# Patient Record
Sex: MALE | Race: WHITE | NOT HISPANIC OR LATINO | Employment: OTHER | URBAN - METROPOLITAN AREA
[De-identification: names, ages, dates, MRNs, and addresses within clinical notes are randomized per-mention and may not be internally consistent; named-entity substitution may affect disease eponyms.]

---

## 2018-01-17 NOTE — CONSULTS
History of Present Illness  Christiana Neves is a pleasant 27-year-old male who presents today for evaluation of a skin lesion of the left side of the nose  He states it has been present for about a year and has been slowly growing in size  He does admit that it bleeds on occasion particularly when he scratches it and images  It does not cause him pain nor does it ooze anything  He has no history of skin cancers and does not follow with the dermatologist       Discussion/Summary    Please see history of present illness  Shave biopsy was taken of Flo's skin lesion on the left side of the nose today  A portable cautery device was used to seal blood vessels  I educated the patient and his son on basic wound care, I've asked him to apply gentle pressure if symptoms bruising does occur  I will see them back in 2 weeks for recheck and pathology review  The patient was counseled regarding  total time of encounter was 30 minutes and 20 minutes was spent counseling        Signatures   Electronically signed by : Heber Sierra, [facility]; Mar  9 2016 10:52AM EST                       (Author)    Electronically signed by : POLA Pedraza ; Mar 16 2016  2:58PM EST

## 2018-01-23 NOTE — CONSULTS
Assessment    1  Lesion of skin of face (121 9) (L98 9)    Discussion/Summary  Discussion Summary:   Please see history of present illness  Shave biopsy was taken of Flo's skin lesion on the left side of the nose today  A portable cautery device was used to seal blood vessels  I educated the patient and his son on basic wound care, I've asked him to apply gentle pressure if symptoms bruising does occur  I will see them back in 2 weeks for recheck and pathology review  Counseling Documentation With Imm: The patient was counseled regarding  total time of encounter was 30 minutes and 20 minutes was spent counseling  History of Present Illness  HPI: Braulio Kayser is a pleasant 51-year-old male who presents today for evaluation of a skin lesion of the left side of the nose  He states it has been present for about a year and has been slowly growing in size  He does admit that it bleeds on occasion particularly when he scratches it and images  It does not cause him pain nor does it ooze anything  He has no history of skin cancers and does not follow with the dermatologist       Review of Systems  Complete-Male:   Constitutional: no fever and no chills  Eyes: no eyesight problems  ENT: no hearing loss  Cardiovascular: lower extremity edema, but no chest pain  Respiratory: Secondary to cardiac issues, but no shortness of breath  Gastrointestinal: no abdominal pain, no nausea, no constipation and no diarrhea  Musculoskeletal: no joint swelling and no joint stiffness  Integumentary: no itching and no skin wound  Psychiatric: no anxiety and no depression  Hematologic/Lymphatic: no tendency for easy bleeding and no tendency for easy bruising  ROS Reviewed:   ROS reviewed  Past Medical History  Active Problems And Past Medical History Reviewed: The active problems and past medical history were reviewed and updated today  Surgical History  Surgical History Reviewed:    The surgical history was reviewed and updated today  Family History  Family History Reviewed: The family history was reviewed and updated today  Social History  Social History Reviewed: The social history was reviewed and updated today  The social history was reviewed and is unchanged  Physical Exam  General Complete Exam (Brief) Male:   Constitutional   General appearance: No acute distress, well appearing and well nourished  Eyes   Conjunctiva and lids: No swelling, erythema, or discharge  Ears, Nose, Mouth, and Throat   External inspection of ears and nose: Normal     Skin 1 cm in diameter skin colored, raised, vasucalr looking lesion (with telangectasias)  There is some crusted blood around it  Procedure: excision of lesion  Risks and benefits were discussed with the patient  The patient was premedicated with ibuprofen  Procedure Note:   Anesthesia:  5 ml of lidocaine 1%  The lesion was located on the L nose  The patient was prepped and draped in the usual sterile fashion using Betadine  a shave biopsy of the lesion was taken  The base of the wound was cauterized with silver nitrate and cautery  Specimen: the excised lesion was place in buffered formalin and sent for pathology  Post-Procedure: Complications: there were no complications  Follow-up in the office in 2 week(s)        Future Appointments    Date/Time Provider Specialty Site   03/23/2016 02:00 PM Briseida Kapoor [facility] Plastic Surgery [facility] PLASTIC/RECONSTRUCTIVE     Signatures   Electronically signed by : Yousif Manriquez [facility]; Mar  9 2016 10:50AM EST                       (Author)    Electronically signed by : Yousif Manriquez [facility]; Mar  9 2016 10:52AM EST                       (Author)    Electronically signed by : POLA Castellanos ; Mar 16 2016  2:58PM EST

## 2018-07-01 ENCOUNTER — HOSPITAL ENCOUNTER (INPATIENT)
Facility: HOSPITAL | Age: 83
LOS: 12 days | Discharge: NURSING, REHAB OR LONG-TERM CARE | DRG: 377 | End: 2018-07-13
Attending: EMERGENCY MEDICINE | Admitting: INTERNAL MEDICINE
Payer: COMMERCIAL

## 2018-07-01 ENCOUNTER — APPOINTMENT (EMERGENCY)
Dept: CT IMAGING | Facility: HOSPITAL | Age: 83
DRG: 377 | End: 2018-07-01
Payer: COMMERCIAL

## 2018-07-01 DIAGNOSIS — N19 RENAL FAILURE: Primary | ICD-10-CM

## 2018-07-01 DIAGNOSIS — J18.9 PNEUMONIA OF BOTH LOWER LOBES DUE TO INFECTIOUS ORGANISM: ICD-10-CM

## 2018-07-01 DIAGNOSIS — N17.9 ACUTE KIDNEY INJURY (HCC): ICD-10-CM

## 2018-07-01 DIAGNOSIS — N18.30 TYPE 2 DIABETES MELLITUS WITH STAGE 3 CHRONIC KIDNEY DISEASE, WITHOUT LONG-TERM CURRENT USE OF INSULIN (HCC): ICD-10-CM

## 2018-07-01 DIAGNOSIS — R33.8 BENIGN PROSTATIC HYPERPLASIA WITH URINARY RETENTION: ICD-10-CM

## 2018-07-01 DIAGNOSIS — M79.622 LEFT UPPER ARM PAIN: ICD-10-CM

## 2018-07-01 DIAGNOSIS — E11.22 TYPE 2 DIABETES MELLITUS WITH STAGE 3 CHRONIC KIDNEY DISEASE, WITHOUT LONG-TERM CURRENT USE OF INSULIN (HCC): ICD-10-CM

## 2018-07-01 DIAGNOSIS — N18.30 CHRONIC KIDNEY DISEASE, STAGE 3 (HCC): ICD-10-CM

## 2018-07-01 DIAGNOSIS — I10 ESSENTIAL HYPERTENSION: ICD-10-CM

## 2018-07-01 DIAGNOSIS — K92.1 MELENA: ICD-10-CM

## 2018-07-01 DIAGNOSIS — N40.1 BENIGN PROSTATIC HYPERPLASIA WITH URINARY RETENTION: ICD-10-CM

## 2018-07-01 DIAGNOSIS — D64.9 ANEMIA: ICD-10-CM

## 2018-07-01 DIAGNOSIS — S30.1XXA CONTUSION OF FLANK: ICD-10-CM

## 2018-07-01 DIAGNOSIS — R91.1 PULMONARY NODULE: ICD-10-CM

## 2018-07-01 DIAGNOSIS — R19.5 HEME POSITIVE STOOL: ICD-10-CM

## 2018-07-01 PROBLEM — D62 ACUTE BLOOD LOSS ANEMIA: Status: ACTIVE | Noted: 2018-07-01

## 2018-07-01 PROBLEM — E78.5 HYPERLIPIDEMIA: Status: ACTIVE | Noted: 2018-07-01

## 2018-07-01 PROBLEM — J96.11 CHRONIC RESPIRATORY FAILURE WITH HYPOXIA (HCC): Status: ACTIVE | Noted: 2018-07-01

## 2018-07-01 PROBLEM — N17.0 ATN (ACUTE TUBULAR NECROSIS) (HCC): Status: ACTIVE | Noted: 2018-07-01

## 2018-07-01 PROBLEM — J44.9 COPD (CHRONIC OBSTRUCTIVE PULMONARY DISEASE) (HCC): Status: ACTIVE | Noted: 2018-07-01

## 2018-07-01 PROBLEM — E11.9 DIABETES MELLITUS (HCC): Status: ACTIVE | Noted: 2018-07-01

## 2018-07-01 PROBLEM — I50.9 CHF (CONGESTIVE HEART FAILURE) (HCC): Status: ACTIVE | Noted: 2018-07-01

## 2018-07-01 PROBLEM — I12.9 PARENCHYMAL RENAL HYPERTENSION: Status: ACTIVE | Noted: 2018-07-01

## 2018-07-01 LAB
ABO GROUP BLD BPU: NORMAL
ABO GROUP BLD: NORMAL
ALBUMIN SERPL BCP-MCNC: 3.3 G/DL (ref 3.5–5)
ALP SERPL-CCNC: 70 U/L (ref 46–116)
ALT SERPL W P-5'-P-CCNC: 21 U/L (ref 12–78)
ANION GAP SERPL CALCULATED.3IONS-SCNC: 12 MMOL/L (ref 4–13)
ANION GAP SERPL CALCULATED.3IONS-SCNC: 6 MMOL/L (ref 4–13)
APTT PPP: 26 SECONDS (ref 24–36)
AST SERPL W P-5'-P-CCNC: 27 U/L (ref 5–45)
ATRIAL RATE: 72 BPM
BACTERIA UR QL AUTO: ABNORMAL /HPF
BASOPHILS # BLD MANUAL: 0 THOUSAND/UL (ref 0–0.1)
BASOPHILS NFR MAR MANUAL: 0 % (ref 0–1)
BILIRUB SERPL-MCNC: 0.4 MG/DL (ref 0.2–1)
BILIRUB UR QL STRIP: NEGATIVE
BLD GP AB SCN SERPL QL: NEGATIVE
BPU ID: NORMAL
BUN SERPL-MCNC: 103 MG/DL (ref 5–25)
BUN SERPL-MCNC: 103 MG/DL (ref 5–25)
CALCIUM SERPL-MCNC: 8 MG/DL (ref 8.3–10.1)
CALCIUM SERPL-MCNC: 8.2 MG/DL (ref 8.3–10.1)
CHLORIDE SERPL-SCNC: 100 MMOL/L (ref 100–108)
CHLORIDE SERPL-SCNC: 95 MMOL/L (ref 100–108)
CK SERPL-CCNC: 56 U/L (ref 39–308)
CLARITY UR: CLEAR
CO2 SERPL-SCNC: 31 MMOL/L (ref 21–32)
CO2 SERPL-SCNC: 33 MMOL/L (ref 21–32)
COLOR UR: YELLOW
CREAT SERPL-MCNC: 2.52 MG/DL (ref 0.6–1.3)
CREAT SERPL-MCNC: 2.81 MG/DL (ref 0.6–1.3)
CROSSMATCH: NORMAL
EOSINOPHIL # BLD MANUAL: 0 THOUSAND/UL (ref 0–0.4)
EOSINOPHIL NFR BLD MANUAL: 0 % (ref 0–6)
ERYTHROCYTE [DISTWIDTH] IN BLOOD BY AUTOMATED COUNT: 15.4 % (ref 11.6–15.1)
GFR SERPL CREATININE-BSD FRML MDRD: 19 ML/MIN/1.73SQ M
GFR SERPL CREATININE-BSD FRML MDRD: 21 ML/MIN/1.73SQ M
GLUCOSE SERPL-MCNC: 194 MG/DL (ref 65–140)
GLUCOSE SERPL-MCNC: 208 MG/DL (ref 65–140)
GLUCOSE SERPL-MCNC: 320 MG/DL (ref 65–140)
GLUCOSE SERPL-MCNC: 342 MG/DL (ref 65–140)
GLUCOSE SERPL-MCNC: 374 MG/DL (ref 65–140)
GLUCOSE UR STRIP-MCNC: ABNORMAL MG/DL
HCT VFR BLD AUTO: 24.4 % (ref 36.5–49.3)
HCT VFR BLD AUTO: 24.9 % (ref 36.5–49.3)
HCT VFR BLD AUTO: 25.6 % (ref 36.5–49.3)
HGB BLD-MCNC: 7.4 G/DL (ref 12–17)
HGB BLD-MCNC: 7.5 G/DL (ref 12–17)
HGB BLD-MCNC: 7.8 G/DL (ref 12–17)
HGB UR QL STRIP.AUTO: ABNORMAL
HYALINE CASTS #/AREA URNS LPF: ABNORMAL /LPF
INR PPP: 1.17 (ref 0.86–1.17)
KETONES UR STRIP-MCNC: NEGATIVE MG/DL
LEUKOCYTE ESTERASE UR QL STRIP: NEGATIVE
LYMPHOCYTES # BLD AUTO: 0.62 THOUSAND/UL (ref 0.6–4.47)
LYMPHOCYTES # BLD AUTO: 4 % (ref 14–44)
MCH RBC QN AUTO: 26.9 PG (ref 26.8–34.3)
MCHC RBC AUTO-ENTMCNC: 29.3 G/DL (ref 31.4–37.4)
MCV RBC AUTO: 92 FL (ref 82–98)
MONOCYTES # BLD AUTO: 0.93 THOUSAND/UL (ref 0–1.22)
MONOCYTES NFR BLD: 6 % (ref 4–12)
NEUTROPHILS # BLD MANUAL: 13.91 THOUSAND/UL (ref 1.85–7.62)
NEUTS SEG NFR BLD AUTO: 90 % (ref 43–75)
NITRITE UR QL STRIP: NEGATIVE
NON-SQ EPI CELLS URNS QL MICRO: ABNORMAL /HPF
NRBC BLD AUTO-RTO: 1 /100 WBC (ref 0–2)
PH UR STRIP.AUTO: 5.5 [PH] (ref 4.5–8)
PLATELET # BLD AUTO: 286 THOUSANDS/UL (ref 149–390)
PLATELET BLD QL SMEAR: ADEQUATE
PMV BLD AUTO: 10 FL (ref 8.9–12.7)
POTASSIUM SERPL-SCNC: 4.2 MMOL/L (ref 3.5–5.3)
POTASSIUM SERPL-SCNC: 4.8 MMOL/L (ref 3.5–5.3)
PROT SERPL-MCNC: 7.4 G/DL (ref 6.4–8.2)
PROT UR STRIP-MCNC: ABNORMAL MG/DL
PROTHROMBIN TIME: 14.2 SECONDS (ref 11.8–14.2)
QRS AXIS: -87 DEGREES
QRSD INTERVAL: 168 MS
QT INTERVAL: 502 MS
QTC INTERVAL: 553 MS
RBC # BLD AUTO: 2.79 MILLION/UL (ref 3.88–5.62)
RBC #/AREA URNS AUTO: ABNORMAL /HPF
RBC MORPH BLD: NORMAL
RH BLD: POSITIVE
SODIUM 24H UR-SCNC: 24 MOL/L
SODIUM SERPL-SCNC: 138 MMOL/L (ref 136–145)
SODIUM SERPL-SCNC: 139 MMOL/L (ref 136–145)
SP GR UR STRIP.AUTO: 1.01 (ref 1–1.03)
SPECIMEN EXPIRATION DATE: NORMAL
T WAVE AXIS: 104 DEGREES
TOTAL CELLS COUNTED SPEC: 100
TROPONIN I SERPL-MCNC: 0.12 NG/ML
UNIT DISPENSE STATUS: NORMAL
UNIT PRODUCT CODE: NORMAL
UNIT RH: NORMAL
UROBILINOGEN UR QL STRIP.AUTO: 0.2 E.U./DL
VENTRICULAR RATE: 73 BPM
WBC # BLD AUTO: 15.45 THOUSAND/UL (ref 4.31–10.16)
WBC #/AREA URNS AUTO: ABNORMAL /HPF

## 2018-07-01 PROCEDURE — 82550 ASSAY OF CK (CPK): CPT | Performed by: INTERNAL MEDICINE

## 2018-07-01 PROCEDURE — 71250 CT THORAX DX C-: CPT

## 2018-07-01 PROCEDURE — 93005 ELECTROCARDIOGRAM TRACING: CPT

## 2018-07-01 PROCEDURE — 85730 THROMBOPLASTIN TIME PARTIAL: CPT | Performed by: EMERGENCY MEDICINE

## 2018-07-01 PROCEDURE — 70450 CT HEAD/BRAIN W/O DYE: CPT

## 2018-07-01 PROCEDURE — 74176 CT ABD & PELVIS W/O CONTRAST: CPT

## 2018-07-01 PROCEDURE — 86850 RBC ANTIBODY SCREEN: CPT | Performed by: EMERGENCY MEDICINE

## 2018-07-01 PROCEDURE — 93010 ELECTROCARDIOGRAM REPORT: CPT | Performed by: INTERNAL MEDICINE

## 2018-07-01 PROCEDURE — 85007 BL SMEAR W/DIFF WBC COUNT: CPT | Performed by: EMERGENCY MEDICINE

## 2018-07-01 PROCEDURE — 86901 BLOOD TYPING SEROLOGIC RH(D): CPT | Performed by: EMERGENCY MEDICINE

## 2018-07-01 PROCEDURE — 85014 HEMATOCRIT: CPT | Performed by: INTERNAL MEDICINE

## 2018-07-01 PROCEDURE — P9021 RED BLOOD CELLS UNIT: HCPCS

## 2018-07-01 PROCEDURE — 99222 1ST HOSP IP/OBS MODERATE 55: CPT | Performed by: INTERNAL MEDICINE

## 2018-07-01 PROCEDURE — 72131 CT LUMBAR SPINE W/O DYE: CPT

## 2018-07-01 PROCEDURE — 80048 BASIC METABOLIC PNL TOTAL CA: CPT | Performed by: INTERNAL MEDICINE

## 2018-07-01 PROCEDURE — 84300 ASSAY OF URINE SODIUM: CPT | Performed by: INTERNAL MEDICINE

## 2018-07-01 PROCEDURE — 94640 AIRWAY INHALATION TREATMENT: CPT

## 2018-07-01 PROCEDURE — 82948 REAGENT STRIP/BLOOD GLUCOSE: CPT

## 2018-07-01 PROCEDURE — 86920 COMPATIBILITY TEST SPIN: CPT

## 2018-07-01 PROCEDURE — 80053 COMPREHEN METABOLIC PANEL: CPT | Performed by: EMERGENCY MEDICINE

## 2018-07-01 PROCEDURE — 86900 BLOOD TYPING SEROLOGIC ABO: CPT | Performed by: EMERGENCY MEDICINE

## 2018-07-01 PROCEDURE — 85610 PROTHROMBIN TIME: CPT | Performed by: EMERGENCY MEDICINE

## 2018-07-01 PROCEDURE — 99285 EMERGENCY DEPT VISIT HI MDM: CPT

## 2018-07-01 PROCEDURE — 94664 DEMO&/EVAL PT USE INHALER: CPT

## 2018-07-01 PROCEDURE — 94760 N-INVAS EAR/PLS OXIMETRY 1: CPT

## 2018-07-01 PROCEDURE — C9113 INJ PANTOPRAZOLE SODIUM, VIA: HCPCS | Performed by: EMERGENCY MEDICINE

## 2018-07-01 PROCEDURE — 99223 1ST HOSP IP/OBS HIGH 75: CPT | Performed by: INTERNAL MEDICINE

## 2018-07-01 PROCEDURE — 85018 HEMOGLOBIN: CPT | Performed by: INTERNAL MEDICINE

## 2018-07-01 PROCEDURE — 36415 COLL VENOUS BLD VENIPUNCTURE: CPT | Performed by: EMERGENCY MEDICINE

## 2018-07-01 PROCEDURE — C9113 INJ PANTOPRAZOLE SODIUM, VIA: HCPCS | Performed by: INTERNAL MEDICINE

## 2018-07-01 PROCEDURE — 87081 CULTURE SCREEN ONLY: CPT | Performed by: EMERGENCY MEDICINE

## 2018-07-01 PROCEDURE — 81001 URINALYSIS AUTO W/SCOPE: CPT | Performed by: EMERGENCY MEDICINE

## 2018-07-01 PROCEDURE — 30233N1 TRANSFUSION OF NONAUTOLOGOUS RED BLOOD CELLS INTO PERIPHERAL VEIN, PERCUTANEOUS APPROACH: ICD-10-PCS | Performed by: INTERNAL MEDICINE

## 2018-07-01 PROCEDURE — 84484 ASSAY OF TROPONIN QUANT: CPT | Performed by: INTERNAL MEDICINE

## 2018-07-01 PROCEDURE — 85027 COMPLETE CBC AUTOMATED: CPT | Performed by: EMERGENCY MEDICINE

## 2018-07-01 PROCEDURE — 72125 CT NECK SPINE W/O DYE: CPT

## 2018-07-01 RX ORDER — METOPROLOL SUCCINATE 25 MG/1
25 TABLET, EXTENDED RELEASE ORAL DAILY
Status: DISCONTINUED | OUTPATIENT
Start: 2018-07-01 | End: 2018-07-02

## 2018-07-01 RX ORDER — ALBUTEROL SULFATE 2.5 MG/3ML
2.5 SOLUTION RESPIRATORY (INHALATION) EVERY 6 HOURS PRN
Status: DISCONTINUED | OUTPATIENT
Start: 2018-07-01 | End: 2018-07-13 | Stop reason: HOSPADM

## 2018-07-01 RX ORDER — ACETAMINOPHEN 325 MG/1
650 TABLET ORAL EVERY 6 HOURS PRN
Status: DISCONTINUED | OUTPATIENT
Start: 2018-07-01 | End: 2018-07-13 | Stop reason: HOSPADM

## 2018-07-01 RX ORDER — ACETAMINOPHEN 650 MG/1
650 SUPPOSITORY RECTAL EVERY 4 HOURS PRN
COMMUNITY

## 2018-07-01 RX ORDER — SPIRONOLACTONE 25 MG/1
25 TABLET ORAL DAILY
COMMUNITY
End: 2018-07-13 | Stop reason: HOSPADM

## 2018-07-01 RX ORDER — ALBUTEROL SULFATE 2.5 MG/3ML
2.5 SOLUTION RESPIRATORY (INHALATION) EVERY 6 HOURS PRN
COMMUNITY

## 2018-07-01 RX ORDER — POLYETHYLENE GLYCOL 3350 17 G/17G
17 POWDER, FOR SOLUTION ORAL DAILY PRN
COMMUNITY

## 2018-07-01 RX ORDER — METOLAZONE 5 MG/1
5 TABLET ORAL DAILY
COMMUNITY
End: 2018-07-13 | Stop reason: HOSPADM

## 2018-07-01 RX ORDER — BUDESONIDE 0.5 MG/2ML
0.5 INHALANT ORAL
Status: DISCONTINUED | OUTPATIENT
Start: 2018-07-01 | End: 2018-07-01

## 2018-07-01 RX ORDER — LATANOPROST 50 UG/ML
1 SOLUTION/ DROPS OPHTHALMIC
Status: DISCONTINUED | OUTPATIENT
Start: 2018-07-01 | End: 2018-07-13 | Stop reason: HOSPADM

## 2018-07-01 RX ORDER — PANTOPRAZOLE SODIUM 40 MG/1
40 INJECTION, POWDER, FOR SOLUTION INTRAVENOUS ONCE
Status: COMPLETED | OUTPATIENT
Start: 2018-07-01 | End: 2018-07-01

## 2018-07-01 RX ORDER — TRAMADOL HYDROCHLORIDE 50 MG/1
50 TABLET ORAL EVERY 6 HOURS PRN
Status: DISCONTINUED | OUTPATIENT
Start: 2018-07-01 | End: 2018-07-01

## 2018-07-01 RX ORDER — BUDESONIDE 0.5 MG/2ML
0.5 INHALANT ORAL 2 TIMES DAILY
COMMUNITY

## 2018-07-01 RX ORDER — HYDROCODONE BITARTRATE AND ACETAMINOPHEN 5; 325 MG/1; MG/1
1 TABLET ORAL EVERY 6 HOURS PRN
Status: DISCONTINUED | OUTPATIENT
Start: 2018-07-01 | End: 2018-07-13 | Stop reason: HOSPADM

## 2018-07-01 RX ORDER — BUDESONIDE 0.5 MG/2ML
0.5 INHALANT ORAL
Status: DISCONTINUED | OUTPATIENT
Start: 2018-07-01 | End: 2018-07-13 | Stop reason: HOSPADM

## 2018-07-01 RX ORDER — PANTOPRAZOLE SODIUM 40 MG/1
40 INJECTION, POWDER, FOR SOLUTION INTRAVENOUS EVERY 12 HOURS SCHEDULED
Status: DISCONTINUED | OUTPATIENT
Start: 2018-07-01 | End: 2018-07-02

## 2018-07-01 RX ORDER — SODIUM CHLORIDE 9 MG/ML
100 INJECTION, SOLUTION INTRAVENOUS CONTINUOUS
Status: CANCELLED | OUTPATIENT
Start: 2018-07-01

## 2018-07-01 RX ORDER — PRAVASTATIN SODIUM 40 MG
40 TABLET ORAL
Status: DISCONTINUED | OUTPATIENT
Start: 2018-07-01 | End: 2018-07-13 | Stop reason: HOSPADM

## 2018-07-01 RX ORDER — PREDNISONE 10 MG/1
10 TABLET ORAL DAILY
COMMUNITY
Start: 2018-07-02 | End: 2018-07-13 | Stop reason: HOSPADM

## 2018-07-01 RX ORDER — ACETAMINOPHEN AND CODEINE PHOSPHATE 300; 30 MG/1; MG/1
1 TABLET ORAL EVERY 4 HOURS PRN
Status: DISCONTINUED | OUTPATIENT
Start: 2018-07-01 | End: 2018-07-01

## 2018-07-01 RX ORDER — ECHINACEA PURPUREA EXTRACT 125 MG
1 TABLET ORAL 2 TIMES DAILY
COMMUNITY

## 2018-07-01 RX ORDER — ONDANSETRON 2 MG/ML
4 INJECTION INTRAMUSCULAR; INTRAVENOUS EVERY 6 HOURS PRN
Status: DISCONTINUED | OUTPATIENT
Start: 2018-07-01 | End: 2018-07-13 | Stop reason: HOSPADM

## 2018-07-01 RX ORDER — SODIUM CHLORIDE 9 MG/ML
60 INJECTION, SOLUTION INTRAVENOUS CONTINUOUS
Status: DISCONTINUED | OUTPATIENT
Start: 2018-07-01 | End: 2018-07-03

## 2018-07-01 RX ADMIN — BUDESONIDE 0.5 MG: 0.5 INHALANT RESPIRATORY (INHALATION) at 19:54

## 2018-07-01 RX ADMIN — PANTOPRAZOLE SODIUM 40 MG: 40 INJECTION, POWDER, FOR SOLUTION INTRAVENOUS at 10:15

## 2018-07-01 RX ADMIN — PRAVASTATIN SODIUM 40 MG: 40 TABLET ORAL at 16:48

## 2018-07-01 RX ADMIN — METOPROLOL SUCCINATE 25 MG: 25 TABLET, EXTENDED RELEASE ORAL at 12:42

## 2018-07-01 RX ADMIN — INSULIN LISPRO 6 UNITS: 100 INJECTION, SOLUTION INTRAVENOUS; SUBCUTANEOUS at 16:49

## 2018-07-01 RX ADMIN — FLUTICASONE PROPIONATE AND SALMETEROL 1 PUFF: 50; 250 POWDER RESPIRATORY (INHALATION) at 19:54

## 2018-07-01 RX ADMIN — SODIUM CHLORIDE 100 ML/HR: 0.9 INJECTION, SOLUTION INTRAVENOUS at 10:15

## 2018-07-01 RX ADMIN — ACETAMINOPHEN 650 MG: 325 TABLET, FILM COATED ORAL at 20:16

## 2018-07-01 RX ADMIN — LATANOPROST 1 DROP: 50 SOLUTION OPHTHALMIC at 21:48

## 2018-07-01 RX ADMIN — INSULIN LISPRO 5 UNITS: 100 INJECTION, SOLUTION INTRAVENOUS; SUBCUTANEOUS at 13:16

## 2018-07-01 RX ADMIN — PANTOPRAZOLE SODIUM 40 MG: 40 INJECTION, POWDER, FOR SOLUTION INTRAVENOUS at 20:13

## 2018-07-01 RX ADMIN — ALBUTEROL SULFATE 2.5 MG: 2.5 SOLUTION RESPIRATORY (INHALATION) at 19:54

## 2018-07-01 RX ADMIN — INSULIN LISPRO 1 UNITS: 100 INJECTION, SOLUTION INTRAVENOUS; SUBCUTANEOUS at 21:48

## 2018-07-01 NOTE — PROGRESS NOTES
Patient laid flat to reposition; he suddenly became flushed with distant stare, guppy breathing with o2 sat dropping to89% on 4L NC and unable to verbally respond to writer  Resolved with head elevation  Dr Terrazas [employer] notified  Same event occurred when patient arrived to unit at 1045am     Family at bedside and unaware if this has happened in past with patient

## 2018-07-01 NOTE — CASE MANAGEMENT
Thank you,  Carmen Aqq  [facility] Utilization Review Department  Phone: [phone]; Fax [phone]  ATTENTION: The Network Utilization Review Department is now centralized for our 9 Facilities  Make a note that we have a new phone and fax numbers for our Department  Please call with any questions or concerns to [phone] and carefully follow the prompts so that you are directed to the right person  All voicemails are confidential  Fax any determinations, approvals, denials, and requests for initial or continue stay review clinical to [phone]  Due to HIGH CALL volume, it would be easier if you could please send faxed requests to expedite your requests and in part, help us provide discharge notifications faster     ===============================================================    Initial Clinical Review    Admission: Date/Time/Statement: 7/1/18 @ 0953   Orders Placed This Encounter   Procedures    Inpatient Admission (expected length of stay for this patient is greater than two midnights)     Standing Status:   Standing     Number of Occurrences:   1     Order Specific Question:   Admitting Physician     Answer:   Aaron Loco [[id number]]     Order Specific Question:   Level of Care     Answer:   Level 1 Stepdown [13]     Order Specific Question:   Estimated length of stay     Answer:   More than 2 Midnights     Order Specific Question:   Certification     Answer:   I certify that inpatient services are medically necessary for this patient for a duration of greater than two midnights  See H&P and MD Progress Notes for additional information about the patient's course of treatment           ED: Date/Time/Mode of Arrival:   ED Arrival Information     Expected Arrival Acuity Means of Arrival Escorted By Service Admission Type    - 7/1/2018 07:20 - Ambulance [id number] EMS General Medicine Emergency    Arrival Complaint    fall          Chief Complaint:   Chief Complaint   Patient presents with    Fall     Patient sent in from [facility] after falling out of his wheelchair today  Staff states this is the 3rd time he has done this in 2 days  Patietn initially complaisned of back pain but none upon arrival       History of Illness:   Chastity Williamson is a 80 y o  male who presented presented to the emergency department from [address] [place] [address] with a fall  As per patient he was going to the bathroom when he lost his balance and fell  He denies any loss of consciousness or head injury  He was sent to ER with right flank contusion     ED Vital Signs:   ED Triage Vitals   Temperature Pulse Respirations Blood Pressure SpO2   07/01/18 0732 07/01/18 0732 07/01/18 0732 07/01/18 0732 07/01/18 0744   (!) 96 3 °F (35 7 °C) 72 20 126/58 95 %      Temp Source Heart Rate Source Patient Position - Orthostatic VS BP Location FiO2 (%)   07/01/18 1047 07/01/18 1522 07/01/18 1047 07/01/18 1047 --   Axillary Monitor Lying Left arm       Pain Score       07/01/18 1045       No Pain        Wt Readings from Last 1 Encounters:   07/01/18 80 6 kg (177 lb 11 1 oz)     Abnormal Labs/Diagnostic Test Results:   WBC 15 45 (H) 07/01/2018      HGB 7 5 (L) 07/01/2018     HCT 25 6 (L) 07/01/2018      07/01/2018      CL 95 (L) 07/01/2018     CO2 31 07/01/2018     ANIONGAP 12 07/01/2018      (H) 07/01/2018     CREATININE 2 81 (H) 07/01/2018     GLUCOSE 342 (H) 07/01/2018     CALCIUM 8 0 (L) 07/01/2018     AST 27 07/01/2018     ALT 21 07/01/2018     ALKPHOS 70 07/01/2018     PROT 7 4 07/01/2018     BILITOT 0 40 07/01/2018     EGFR 19 07/01/2018     CT chest, abd/pel:  : 1   No acute injury within the chest, abdomen, or pelvis  2   Scattered subcentimeter pulmonary nodules, largest measuring 6 mm  3   Small right pleural effusion and bibasilar atelectasis  CT head:  No acute intracranial abnormality  Microangiopathic changes  Small posterior scalp hematoma       ED Treatment:   Medication Administration from 07/01/2018 0720 to 07/01/2018 1035       Date/Time Order Dose Route Action     07/01/2018 1015 sodium chloride 0 9 % infusion 100 mL/hr Intravenous Given     07/01/2018 1015 pantoprazole (PROTONIX) injection 40 mg 40 mg Intravenous Given          Past Medical/Surgical History:   Past Medical History:   Diagnosis Date    Aortic regurgitation     Atrial fibrillation (HCC)     Cancer (HCC)     CHF (congestive heart failure) (HCC)     Complete heart block (HCC)     [facility]     Coronary artery disease     Diabetes mellitus ([place] [address] )     [facility] aneurysm ([facility] )     Endocarditis     Hx of bleeding disorder     Hypertension     Mitral regurgitation     Skin cancer        Admitting Diagnosis: Contusion of flank [S30 1XXA]  Renal failure [N19]  Anemia [D64 9]  Fall [W19  XXXA]  Heme positive stool [R19 5]  Pulmonary nodule [R91 1]    Age/Sex: 80 y o  male    Assessment/Plan:   Principal Problem:    Melena  Active Problems:    Acute kidney injury ([place] [address] )    Chronic kidney disease, stage 3    Diabetes mellitus (HCC)    CHF (congestive heart failure) (HCC)    Acute blood loss anemia    Chronic respiratory failure with hypoxia (HCC)    Hypertension    COPD (chronic obstructive pulmonary disease) (HCC)    Contusion of flank    Hyperlipidemia  Resolved Problems:    * No resolved hospital problems  *        Plan:  Admit to step-down unit as inpatient status  · Melena/GI bleed  · Acute blood loss anemia secondary to above  Monitor H&H q 6 hours and transfuse as needed  Will start on Protonix 40 mg IV q 12 hours  DC aspirin  Avoid antiplatelets/anticoagulants  GI consult  Will start on clear liquid diet  NPO after midnight for possible endoscopy in a m  IV fluids    · Acute kidney injury on chronic kidney disease stage 3  Patient's baseline creatinine is 1 52  Avoid hypotension/nephrotoxins medications/NSAIDs  Will hold diuretics for now  · Chronic CHF-likely diastolic  · Hypertension and hyperlipidemia  Hold diuretics secondary to acute kidney injury  Patient is on Zaroxolyn, Lasix and spironolactone  Continue on Toprol-XL and statin  · Chronic respiratory failure with hypoxia/COPD  Continue on home oxygen at 2 L via nasal  Continue on bronchodilators  · Diabetes mellitus  Accu-Chek a c  HS with Humalog sliding scale  · GI/DVT prophylaxis  · Discussed with patient and family in detail  Anticipated length of stay greater than 2 midnights    Admission Orders:  Scheduled Meds:   Current Facility-Administered Medications:  acetaminophen 650 mg Oral Q6H PRN    albuterol 2 5 mg Nebulization Q6H PRN    budesonide 0 5 mg Nebulization BID    fluticasone-salmeterol 1 puff Inhalation Q12H    HYDROcodone-acetaminophen 1 tablet Oral Q6H PRN    insulin lispro 1-6 Units Subcutaneous TID AC    latanoprost 1 drop Both Eyes HS    metoprolol succinate 25 mg Oral Daily    ondansetron 4 mg Intravenous Q6H PRN    pantoprazole 40 mg Intravenous Q12H [address]    pravastatin 40 mg Oral Daily With Dinner    sodium chloride 100 mL/hr Intravenous Continuous Last Rate: Stopped (07/01/18 1550)     Continuous Infusions:   sodium chloride 100 mL/hr Last Rate: Stopped (07/01/18 1550)      Hemoglobin 7 4 (L) 12 0 - 17 0 g/dL     Hematocrit 24 4 (L) 36 5 - 49 3 %      Transfuse 1 unit PRBCs    NPO  H/H q6h  Consult PT/OT  [facility]  Consult Nephrology

## 2018-07-01 NOTE — RESPIRATORY THERAPY NOTE
RT Protocol Note  Shaheed Weinberg 80 y o  male MRN: [id number]  Unit/Bed#: -01 Encounter: [id number]    Assessment    Principal Problem:    Melena  Active Problems:    Acute kidney injury ([facility] )    Chronic kidney disease, stage 3    Diabetes mellitus (HCC)    CHF (congestive heart failure) (HCC)    Acute blood loss anemia    Chronic respiratory failure with hypoxia (HCC)    Hypertension    COPD (chronic obstructive pulmonary disease) (HCC)    Contusion of flank    Hyperlipidemia      Home Pulmonary med  yes       Past Medical History:   Diagnosis Date    Aortic regurgitation     Atrial fibrillation (HCC)     Cancer (HCC)     colon    CHF (congestive heart failure) (HCC)     Complete heart block (HCC)     post CABG    COPD (chronic obstructive pulmonary disease) (HCC)     Coronary artery disease     MI    Diabetes mellitus ([facility] )     Dissecting aortic aneurysm (HCC)     Endocarditis     Hx of bleeding disorder     GI bleed on aspirin    Hypertension     Mitral regurgitation     Skin cancer      Social History     Social History    Marital status:      Spouse name: N/A    Number of children: N/A    Years of education: N/A     Social History Main Topics    Smoking status: Former Smoker    Smokeless tobacco: Former User     Quit date: 1970    Alcohol use Yes      Comment: rarely    Drug use: No    Sexual activity: Not Asked     Other Topics Concern    None     Social History Narrative    None       Subjective         Objective    Physical Exam:   Assessment Type: Assess only  General Appearance: Alert, Awake  Respiratory Pattern: Normal  Chest Assessment: Chest expansion symmetrical  Bilateral Breath Sounds: Diminished  Cough: None    Vitals:  Blood pressure 124/58, pulse 73, temperature 99 6 °F (37 6 °C), temperature source Axillary, resp  rate 18, height 5' 6" (1 676 m), weight 80 6 kg (177 lb 11 1 oz), SpO2 96 %            Imaging and other studies: I have personally reviewed pertinent reports              Plan    Respiratory Plan: No distress/Pulmonary history

## 2018-07-01 NOTE — PROGRESS NOTES
Pt received and care transferred from [facility]  Upon introductions  1 Unit of PRBC had infused  NSS infusing through PIV  Pt Awake, alert, oriented to person, place  Denies pain  When repositioned however pt's face does become eunice and after repositioned and HOB elevated pt grunts, but denies pain  Pt's daughter, Wen Hinton at bedside  Needs me  Assessment on-going  Regine Sage cait BMP and H&H and sent to lab

## 2018-07-01 NOTE — CONSULTS
Consultation - Nephrology   Chastity Williamson 80 y o  male MRN: [id number]  Unit/Bed#: -01 Encounter: [id number]    Inpatient consult to Nephrology  Consult performed by: Sulaiman Forte  Consult ordered by: Niurka Chahal          History of Present Illness   Physician Requesting Consult: Jerrod Henry MD  Reason for Consult / Principal Problem:  DINO/CKD stage 3  Hx and PE limited by:  Somewhat limited historian    HPI: Chastity Williamson is a 80y o  year old male with a history of CAD-MI/CABG/COPD/AFib/colon cancer/diabetes mellitus/aortic aneurysm dissection who presents with a fall from [address]  Apparently he was seen in the ER had right flank contusion but no acute pain  According to the family he has been declining over the last 3-4 weeks  He was found to have a hemoglobin of 7 5 with a previous hemoglobin of 12 4 in February  Apparently there is a history also black tarry stool for few days but no bright red blood per rectum, no abdominal pain  He denies any fevers chills cough or colds  No chest pain  He does have some shortness of breath at times  We are asked to see him for acute on top of chronic kidney disease  Baseline creatinine is about 1 52 mg/dL in February, as now 2 81 mg/dL associated with a BUN of 103  He denies any prior kidney disease, urinary symptoms including dysuria hematuria only rare voiding symptoms, no NSAID use no BPH or prostate disease, no kidney stones or urinary tract infections  He does not see a nephrologist on a regular basis      History obtained from the patient and the chart which I completely reviewed as well as the patient's family by bedside        General:  Very weak appearing, not eating well last few days no fevers or chills however  Cardiovascular:  No chest pain, some mild shortness of breath, occasional leg swelling; he is not on a diuretic according to the patient  Respiratory:  Some mild shortness of breath but no cough  Gastrointestinal:  See HPI  Genitourinary:  See HPI  Neuro:  No headaches, dizziness or lightheadedness at this time  Rest of review of systems as completely reviewed with the patient are negative    Historical Information   Past Medical History:   Diagnosis Date    Aortic regurgitation     Atrial fibrillation (HCC)     Cancer ([facility] )     colon    CHF (congestive heart failure) (HCC)     Complete heart block (HCC)     post CABG    COPD (chronic obstructive pulmonary disease) ([facility] )     Coronary artery disease     MI    Diabetes mellitus ([facility] )     Dissecting aortic aneurysm ([facility] )     Endocarditis     Hx of bleeding disorder     GI bleed on aspirin    Hypertension     Mitral regurgitation     Skin cancer      Past Surgical History:   Procedure Laterality Date    AORTIC VALVE REPLACEMENT      CARDIAC PACEMAKER PLACEMENT      [employer]     CARDIAC SURGERY      CABG on physician notes, none per pt   CHOLECYSTECTOMY      COLECTOMY      FLAP LOCAL HEAD / NECK N/A 5/19/2016    Procedure: VERSUS FLAP ;  Surgeon: Adelaide Li MD;  Location: [facility];  Service:     JOINT REPLACEMENT Left     hip    MITRAL VALVE REPLACEMENT      MOHS RECONSTRUCTION N/A 5/19/2016    Procedure: RECONSTRUCTION MOHS DEFECT NOSE AND SCALP;  Surgeon: Adelaide Li MD;  Location[facility];  Service:    [employer] SKIN BIOPSY       Social History   History   Alcohol Use    Yes     Comment: rarely     History   Drug Use No     History   Smoking Status    Former Smoker   Smokeless Tobacco    Former User    Quit date: 1970     History reviewed  No pertinent family history      Meds/Allergies   all current active meds have been reviewed, current meds: Current Facility-Administered Medications   Medication Dose Route Frequency    acetaminophen (TYLENOL) tablet 650 mg  650 mg Oral Q6H PRN    albuterol inhalation solution 2 5 mg  2 5 mg Nebulization Q6H PRN    budesonide (PULMICORT) inhalation solution 0 5 mg 0 5 mg Nebulization BID    fluticasone-salmeterol (ADVAIR) 250-50 mcg/dose inhaler 1 puff  1 puff Inhalation Q12H    HYDROcodone-acetaminophen (NORCO) 5-325 mg per tablet 1 tablet  1 tablet Oral Q6H PRN    insulin lispro (HumaLOG) 100 units/mL subcutaneous injection 1-6 Units  1-6 Units Subcutaneous TID AC    latanoprost (XALATAN) 0 005 % ophthalmic solution 1 drop  1 drop Both Eyes HS    metoprolol succinate (TOPROL-XL) 24 hr tablet 25 mg  25 mg Oral Daily    ondansetron (ZOFRAN) injection 4 mg  4 mg Intravenous Q6H PRN    pantoprazole (PROTONIX) injection 40 mg  40 mg Intravenous Q12H CELIO    pravastatin (PRAVACHOL) tablet 40 mg  40 mg Oral Daily With Dinner    sodium chloride 0 9 % infusion  100 mL/hr Intravenous Continuous    and PTA meds:  Prescriptions Prior to Admission   Medication    acetaminophen (TYLENOL) 650 mg suppository    albuterol (2 5 mg/3 mL) 0 083 % nebulizer solution    aspirin 81 MG tablet    budesonide (PULMICORT) 0 5 mg/2 mL nebulizer solution    docusate sodium (COLACE) 100 mg capsule    famotidine (PEPCID) 20 mg tablet    furosemide (LASIX) 40 mg tablet    glimepiride (AMARYL) 1 mg tablet    latanoprost (XALATAN) 0 005 % ophthalmic solution    metolazone (ZAROXOLYN) 5 mg tablet    metoprolol succinate (TOPROL-XL) 25 mg 24 hr tablet    polyethylene glycol (MIRALAX) 17 g packet    [START ON 7/2/2018] predniSONE 10 mg tablet    senna (SENOKOT) 8 6 MG tablet    simvastatin (ZOCOR) 20 mg tablet    sodium chloride (OCEAN) 0 65 % nasal spray    spironolactone (ALDACTONE) 25 mg tablet    umeclidinium-vilanterol (ANORO ELLIPTA) 62 5-25 MCG/INH inhaler    acetaminophen-codeine (TYLENOL #3) 300-30 mg per tablet    cephalexin (KEFLEX) 500 mg capsule       Allergies   Allergen Reactions    Plavix [Clopidogrel Bisulfate] GI Bleeding       Objective     Intake/Output Summary (Last 24 hours) at 07/01/18 1654  Last data filed at 07/01/18 1630   Gross per 24 hour   Intake 1278 37 ml   Output              498 ml   Net           780 37 ml   Patient Vitals for the past 24 hrs:   BP Temp Temp src Pulse Resp SpO2 Height Weight   07/01/18 1645 103/53 98 1 °F (36 7 °C) Oral 68 20 96 % - -   07/01/18 1630 112/61 - - 68 - 96 % - -   07/01/18 1615 114/63 - - 70 - 97 % - -   07/01/18 1606 117/64 98 7 °F (37 1 °C) Oral 69 20 97 % - -   07/01/18 1547 127/64 98 3 °F (36 8 °C) - 70 20 - - -   07/01/18 1522 107/74 (!) 96 9 °F (36 1 °C) Axillary 68 20 96 % - -   07/01/18 1515 - - - 70 - 99 % - -   07/01/18 1315 - - - 75 - 99 % - -   07/01/18 1307 - - - 77 - (!) 86 % - -   07/01/18 1242 124/58 - - 73 - - - -   07/01/18 1215 130/60 - - 72 - 98 % - -   07/01/18 1200 107/57 - - 72 - 97 % - -   07/01/18 1145 123/60 - - 76 - 97 % - -   07/01/18 1130 - - - 74 - 97 % - -   07/01/18 1115 - - - 76 - 99 % - -   07/01/18 1100 - - - 76 - 97 % - -   07/01/18 1047 120/58 99 6 °F (37 6 °C) Axillary 85 18 96 % - -   07/01/18 1045 (!) 88/48 - - 72 - 90 % 5' 6" (1 676 m) 80 6 kg (177 lb 11 1 oz)   07/01/18 1019 - - - 74 (!) 23 98 % - -   07/01/18 1015 131/62 - - 75 (!) 27 98 % - -   07/01/18 1000 131/62 - - 76 19 100 % - -   07/01/18 0849 140/56 - - 78 (!) 25 100 % - -   07/01/18 0830 139/65 - - 76 20 98 % - -   07/01/18 0817 138/74 - - 77 20 98 % - -   07/01/18 0744 126/58 - - 71 20 95 % - -   07/01/18 0732 126/58 (!) 96 3 °F (35 7 °C) - 72 20 - - -       Invasive Devices:      Vitals:    07/01/18 1645   BP: 103/53   Pulse: 68   Resp: 20   Temp: 98 1 °F (36 7 °C)   SpO2: 96%     General:  Obese, very weak appearing but no acute distress  Skin:  No acute rash but pale  Eyes:  No scleral icterus noninjected  ENT:  Very dry mucous membranes, normocephalic/atraumatic  Neck:  Supple, no jugular venous distention, no carotid bruits, 1+ carotid upstroke, no thyromegaly  Back:  No CVA tenderness  Chest:  Slight rhonchi at the bases otherwise clear to auscultation percussion  CVS:  Slight decrease heart sounds but seems regular, no rub or gallops or murmurs appreciable  Abdomen:  Obese, distended, nontender, normal bowel sounds, no overt hepatosplenomegaly or bruits  Extremities:  No clubbing, no cyanosis, no significant edema, poor distal pulses and no femoral bruits  Neuro:  No gross focality  Psych:  Alert and oriented    Current Weight: Weight - Scale: 80 6 kg (177 lb 11 1 oz)  First Weight: Weight - Scale: 80 6 kg (177 lb 11 1 oz)    Lab Results:  I have personally reviewed pertinent labs  CBC: Lab Results   Component Value Date    WBC 15 45 (H) 07/01/2018    RBC 2 79 (L) 07/01/2018     CMP: Lab Results   Component Value Date     07/01/2018    CL 95 (L) 07/01/2018    CO2 31 07/01/2018    ANIONGAP 12 07/01/2018     (H) 07/01/2018    CREATININE 2 81 (H) 07/01/2018    GLUCOSE 342 (H) 07/01/2018    CALCIUM 8 0 (L) 07/01/2018    AST 27 07/01/2018    ALT 21 07/01/2018    ALKPHOS 70 07/01/2018    PROT 7 4 07/01/2018    BILITOT 0 40 07/01/2018    EGFR 19 07/01/2018     Phosphorus: No results found for: PHOS  Magnesium: No results found for: MG  Urinalysis: Lab Results   Component Value Date    COLORU Yellow 07/01/2018    CLARITYU Clear 07/01/2018    SPECGRAV 1 015 07/01/2018    PHUR 5 5 07/01/2018    LEUKOCYTESUR Negative 07/01/2018    NITRITE Negative 07/01/2018    PROTEINUA Trace (A) 07/01/2018    GLUCOSEU 100 (1/10%) (A) 07/01/2018    KETONESU Negative 07/01/2018    BILIRUBINUR Negative 07/01/2018    BLOODU Trace-Intact (A) 07/01/2018     BMP: Lab Results   Component Value Date    GLUCOSE 342 (H) 07/01/2018    CO2 31 07/01/2018     (H) 07/01/2018    CREATININE 2 81 (H) 07/01/2018    CALCIUM 8 0 (L) 07/01/2018     Radiology review:  CT of the chest abdomen pelvis:  07/01/2018:  -scattered sub cm pulmonary nodules per radiology report  -8 mm hemorrhagic cyst within the upper pole of left kidney but no hydronephrosis  -small right pleural effusion by basilar atelectasis        Assessment/Plan   1    CKD stage 3: The best of our knowledge baseline creatinine is about 1 5 mg/dL from February 2018  Most compatible with diabetic nephropathy/hypertensive nephrosclerosis/arteriolar nephrosclerosis  2   DINO:  Probably prerenal on furosemide 40 mg twice a day and metolazone daily, along with spironolactone especially associated with poor intake, and along with very low hemoglobin, low blood pressure earlier with a systolic of 88, and therefore possible ATN given hypoperfusion  No evidence of obstructive uropathy  Recommendations: Workup:  -UA:  Trace blood, trace protein  -CT:  No obstructive uropathy; hemorrhagic cyst is noted in the upper pole of the left kidney  -check PVR with bladder scan  -check urine sodium  -I would obtain a renal ultrasound regarding the hemorrhagic cyst  Treatment:  -hold diuretics  -slow IV fluids  -transfuse per primary team  -check mineral bone disorder including phosphorus and magnesium  3  Electrolytes are acceptable  4  Anemia/GI bleeding: For GI evaluation  History of occult bleeding and iron deficiency in the past   -follow up with GI  -check iron studies  -transfuse per primary team  -PPI  5  Hypertension:  Low normal at this time  Hold parameters on any medications  Discussed with primary service  Portions of the record may have been created with voice recognition software   Occasional wrong word or "sound a like" substitutions may have occurred due to the inherent limitations of voice recognition software   Read the chart carefully and recognize, using context, where substitutions have occurred

## 2018-07-01 NOTE — CONSULTS
Consultation - GI   Naresh Benson 80 y o  male MRN: [id number]  Unit/Bed#: -01 Encounter: [id number]      Assessment/Plan     Assessment:  1  Symptomatic anemia with melena  Rule out GI bleed particularly upper GI bleed  2   Acute anemia probably acute blood loss anemia  3  GERD on Pepcid  4  Distant history of iron deficiency anemia and occult GI bleeding  S/P EGD/colonoscopy/capsule endoscopy 2007  Treated with a year course of oral iron  5  Distant history of peptic ulcer disease  6  Distant history of colon cancer  7  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  with congestive heart failure followed by Dr Ida Alexis  8  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  9  H/O AAA  10   DM  Plan:  Transfuse 1 unit of packed red blood cells gently today   Follow H&H and signs of bleeding  IV PPI  EGD in a m  Consults    Physician Requesting Consult: Hood Simms MD  Reason for Consult / Principal Problem:  Weakness and melena  Symptomatic anemia    HPI: Naresh Benson is a 80y o  year old male who presents with weakness and a fall  According to the patient and his family he has had increasing weakness and difficulty ambulating over the last 2 weeks  He had been residing a personal care center and is no longer been able to uses walker to walk to the dining room for his meals  Today he fell attempting to transfer to wheelchair and was brought into the emergency room  Was found have a hemoglobin of 7 5  According to the old records his hemoglobin was over 12 in February  He reports having black stools over the last 5 days  That a stool sample in the emergency room confirmed blood in his stool  He denies any dysphagia, odynophagia, or early satiety  His family reports some heartburn and some belching in the past   He is moving his bowels regularly and denies any bright red blood per rectum his weight has been relatively stable  He takes a baby aspirin a day but avoid any other NSAIDs    From a GI standpoint he has a history of occult GI bleeding and iron deficiency anemia he had an EGD, colonoscopy, capsule endoscopy in 2007  He has had issues with Plavix and full strength aspirin causing nose bleeds  He has been maintained on a baby aspirin alone  He has a history of colon cancer in the 76s  He had a surgical resection required no chemotherapy or radiation  Last colonoscopy was 5-10 years ago by Dr Quyen Armstrong  He was told at that time he no longer needed any further colonoscopies  He has a history of peptic ulcer disease in the 62s he has a history of GERD and is maintained on Pepcid  His last EGD was in 2007 he is a questionable history of esophageal perforation  He was [facility] but was treated conservatively        Review of Systems: Negative for 14 point exam except for HPI  Historical Information   Past Medical History:   Diagnosis Date    Aortic regurgitation     Atrial fibrillation (HCC)     Cancer (HCC)     colon    CHF (congestive heart failure) (HCC)     Complete heart block (HCC)     post CABG    COPD (chronic obstructive pulmonary disease) (HCC)     Coronary artery disease     MI    Diabetes mellitus ([address] )     Dissecting aortic aneurysm (HCC)     Endocarditis     Hx of bleeding disorder     GI bleed on aspirin    Hypertension     Mitral regurgitation     Skin cancer      Past Surgical History:   Procedure Laterality Date    AORTIC VALVE REPLACEMENT      CARDIAC PACEMAKER PLACEMENT      [employer]     CARDIAC SURGERY      CABG on physician notes, none per pt      CHOLECYSTECTOMY      COLECTOMY      FLAP LOCAL HEAD / NECK N/A 5/19/2016    Procedure: VERSUS FLAP ;  Surgeon: Alana Wu MD;  Location: [facility];  Service:     JOINT REPLACEMENT Left     hip    MITRAL VALVE REPLACEMENT      MOHS RECONSTRUCTION N/A 5/19/2016    Procedure: RECONSTRUCTION MOHS DEFECT NOSE AND SCALP;  Surgeon: Alana Wu MD;  Location: [address] [place] [address] MAIN OR;  Service:     SKIN BIOPSY       Social History   History   Alcohol Use    Yes     Comment: rarely     History   Drug Use No     History   Smoking Status    Former Smoker   Smokeless Tobacco    Former User    Quit date: 1970     History reviewed  No pertinent family history      Meds/Allergies   Current Facility-Administered Medications   Medication Dose Route Frequency    acetaminophen (TYLENOL) tablet 650 mg  650 mg Oral Q6H PRN    albuterol inhalation solution 2 5 mg  2 5 mg Nebulization Q6H PRN    budesonide (PULMICORT) inhalation solution 0 5 mg  0 5 mg Nebulization BID    fluticasone-salmeterol (ADVAIR) 250-50 mcg/dose inhaler 1 puff  1 puff Inhalation Q12H    HYDROcodone-acetaminophen (NORCO) 5-325 mg per tablet 1 tablet  1 tablet Oral Q6H PRN    insulin lispro (HumaLOG) 100 units/mL subcutaneous injection 1-6 Units  1-6 Units Subcutaneous TID AC    latanoprost (XALATAN) 0 005 % ophthalmic solution 1 drop  1 drop Both Eyes HS    metoprolol succinate (TOPROL-XL) 24 hr tablet 25 mg  25 mg Oral Daily    ondansetron (ZOFRAN) injection 4 mg  4 mg Intravenous Q6H PRN    pantoprazole (PROTONIX) injection 40 mg  40 mg Intravenous Q12H CELIO    pravastatin (PRAVACHOL) tablet 40 mg  40 mg Oral Daily With Dinner    sodium chloride 0 9 % infusion  100 mL/hr Intravenous Continuous     Prescriptions Prior to Admission   Medication    acetaminophen (TYLENOL) 650 mg suppository    albuterol (2 5 mg/3 mL) 0 083 % nebulizer solution    aspirin 81 MG tablet    budesonide (PULMICORT) 0 5 mg/2 mL nebulizer solution    docusate sodium (COLACE) 100 mg capsule    famotidine (PEPCID) 20 mg tablet    furosemide (LASIX) 40 mg tablet    glimepiride (AMARYL) 1 mg tablet    latanoprost (XALATAN) 0 005 % ophthalmic solution    metolazone (ZAROXOLYN) 5 mg tablet    metoprolol succinate (TOPROL-XL) 25 mg 24 hr tablet    polyethylene glycol (MIRALAX) 17 g packet    [START ON 7/2/2018] predniSONE 10 mg tablet  senna (SENOKOT) 8 6 MG tablet    simvastatin (ZOCOR) 20 mg tablet    sodium chloride (OCEAN) 0 65 % nasal spray    spironolactone (ALDACTONE) 25 mg tablet    umeclidinium-vilanterol (ANORO ELLIPTA) 62 5-25 MCG/INH inhaler    acetaminophen-codeine (TYLENOL #3) 300-30 mg per tablet    cephalexin (KEFLEX) 500 mg capsule       Allergies   Allergen Reactions    Plavix [Clopidogrel Bisulfate] GI Bleeding           Physical Exam   Constitutional: He is oriented to person, place, and time  He appears well-developed and well-nourished  HENT:  Within normal limits  Head: Normocephalic and atraumatic  Eyes: Pupils are equal, round, and reactive to light  Neck: Normal range of motion  Neck supple  Cardiovascular: Normal rate and regular rhythm  Pulmonary/Chest: Effort normal and breath sounds normal    Abdominal: Soft  Bowel sounds are normal  Softly distended  Nontender   Musculoskeletal: Normal range of motion  Neurological: He is alert and oriented to person, place, and time  Extremity:  No edema  Skin: Skin is warm and dry  Psychiatric: He has a normal mood and affect         Lab Results:   Admission on 07/01/2018   Component Date Value    WBC 07/01/2018 15 45*    RBC 07/01/2018 2 79*    Hemoglobin 07/01/2018 7 5*    Hematocrit 07/01/2018 25 6*    MCV 07/01/2018 92     MCH 07/01/2018 26 9     MCHC 07/01/2018 29 3*    RDW 07/01/2018 15 4*    MPV 07/01/2018 10 0     Platelets 66/18/0766 286     Sodium 07/01/2018 138     Potassium 07/01/2018 4 8     Chloride 07/01/2018 95*    CO2 07/01/2018 31     Anion Gap 07/01/2018 12     BUN 07/01/2018 103*    Creatinine 07/01/2018 2 81*    Glucose 07/01/2018 342*    Calcium 07/01/2018 8 0*    AST 07/01/2018 27     ALT 07/01/2018 21     Alkaline Phosphatase 07/01/2018 70     Total Protein 07/01/2018 7 4     Albumin 07/01/2018 3 3*    Total Bilirubin 07/01/2018 0 40     eGFR 07/01/2018 19     Protime 07/01/2018 14 2     INR 07/01/2018 1 17     PTT 07/01/2018 26     Segmented % 07/01/2018 90*    Lymphocytes % 07/01/2018 4*    Monocytes % 07/01/2018 6     Eosinophils % 07/01/2018 0     Basophils % 07/01/2018 0     Absolute Neutrophils 07/01/2018 13 91*    Lymphocytes Absolute 07/01/2018 0 62     Monocytes Absolute 07/01/2018 0 93     Eosinophils Absolute 07/01/2018 0 00     Basophils Absolute 07/01/2018 0 00     Total Counted 07/01/2018 100     nRBC 07/01/2018 1     RBC Morphology 07/01/2018 Normal     Platelet Estimate 14/69/3060 Adequate     ABO Grouping 07/01/2018 B     Rh Factor 07/01/2018 Positive     Antibody Screen 07/01/2018 Negative     Specimen Expiration Date 07/01/2018 [id number]     Hemoglobin 07/01/2018 7 4*    Hematocrit 07/01/2018 24 4*    Troponin I 07/01/2018 0 12*    Total CK 07/01/2018 56     POC Glucose 07/01/2018 320*     Imaging Studies: I have personally reviewed pertinent reports  EKG, Pathology, and Other Studies: I have personally reviewed pertinent reports  Counseling / Coordination of Care  Total floor / unit time spent today 1 hour

## 2018-07-01 NOTE — ED PROVIDER NOTES
2H&P Exam - Trauma   Naresh Benson 80 y o  male MRN: [id number]  Unit/Bed#: ED 06/ED 06 Encounter: [id number]    Assessment/Plan   Trauma Alert: Trauma Acuity: Trauma Evaluation  Model of Arrival: Trauma Mode of Arrival: ALS via    Trauma Team: Current Providers  Attending Provider: Perla Crawford DO  Registered Nurse: Beny Pichardo, RN  Consulting Physician: Hood Simms MD  Consultants: Other: Dr Ribera Stage for admission  Time Called 2128    Trauma Active Problems:  Fall out of nursing home with right flank contusions    Trauma Plan:  Radiologic  evaluation    Chief Complaint:   Chief Complaint   Patient presents with    Fall     Patient sent in from [place] [id number] after falling out of his wheelchair today  Staff states this is the 3rd time he has done this in 2 days  Jacquelyn Peabody initially complaisned of back pain but none upon arrival       History of Present Illness   HPI:  Naresh Benson is a 80 y o  male who presents with  Right flank contusion after falling out of his wheelchair apparently this is the 3rd time in the past few days denies any head injury or loss of consciousness did have back pain earlier but does not  Complain of anything at this time he is on continuous nasal cannula 2 L and his oxygen saturations are in the 90s  Mechanism:Details of Incident: Patient slide out of his wheelchair this AM   Staff states this is the 3rd time in two days           22-year-old male presents from nursing home for evaluation after fall out of wheelchair        History provided by:  Patient and EMS personnel  History limited by:  Age  Fall   Mechanism of injury: fall    Injury location: Right flank  Incident location:  Nursing home  Fall:     Fall occurred: From wheelchair      Impact surface:  Hard floor  Suspicion of alcohol use: no    Tetanus status:  Unknown  Prior to arrival data:     Responsiveness at scene:  Alert    Orientation at scene:  Person, place and situation    Loss of consciousness: no    Associated symptoms: back pain    Associated symptoms: no abdominal pain and no neck pain      Review of Systems   Respiratory: Positive for wheezing  Gastrointestinal: Negative for abdominal pain  Musculoskeletal: Positive for back pain  Negative for neck pain  All other systems reviewed and are negative  Historical Information     Immunizations: There is no immunization history on file for this patient  Past Medical History:   Diagnosis Date    Aortic regurgitation     Atrial fibrillation (HCC)     Cancer (HCC)     colon    CHF (congestive heart failure) (HCC)     Complete heart block (HCC)     post CABG    Coronary artery disease     MI    Diabetes mellitus ([address] )     Dissecting aortic aneurysm (HCC)     Endocarditis     Hx of bleeding disorder     GI bleed on aspirin    Mitral regurgitation     Skin cancer      No family history on file  Past Surgical History:   Procedure Laterality Date    AORTIC VALVE REPLACEMENT      CARDIAC PACEMAKER PLACEMENT      [employer]     CARDIAC SURGERY      CABG on physician notes, none per pt   CHOLECYSTECTOMY      COLECTOMY      FLAP LOCAL HEAD / NECK N/A 5/19/2016    Procedure: VERSUS FLAP ;  Surgeon: Beny Jordan MD;  Location: [facility];  Service:     JOINT REPLACEMENT Left     hip    MITRAL VALVE REPLACEMENT      MOHS RECONSTRUCTION N/A 5/19/2016    Procedure: RECONSTRUCTION MOHS DEFECT NOSE AND SCALP;  Surgeon: Beny Jordan MD;  Location: [facility];  Service:    Vena Danna SKIN BIOPSY         Social History     Social History    Marital status:       Spouse name: N/A    Number of children: N/A    Years of education: N/A     Social History Main Topics    Smoking status: Former Smoker    Smokeless tobacco: Former User     Quit date: 1970    Alcohol use Yes      Comment: rarely    Drug use: No    Sexual activity: Not Asked     Other Topics Concern    None     Social History Narrative    None       Family History: non-contributory    Meds/Allergies   Prior to Admission Medications   Prescriptions Last Dose Informant Patient Reported? Taking?   acetaminophen (TYLENOL) 650 mg suppository   Yes Yes   Sig: Insert 650 mg into the rectum every 4 (four) hours as needed for mild pain   acetaminophen-codeine (TYLENOL #3) 300-30 mg per tablet   No No   Sig: Take 1 tablet by mouth every 4 (four) hours as needed for moderate pain for up to 20 doses  albuterol (2 5 mg/3 mL) 0 083 % nebulizer solution   Yes Yes   Sig: Take 2 5 mg by nebulization every 6 (six) hours as needed for wheezing or shortness of breath   aspirin 81 MG tablet   Yes Yes   Sig: Take 81 mg by mouth daily  budesonide (PULMICORT) 0 5 mg/2 mL nebulizer solution   Yes Yes   Sig: Take 0 5 mg by nebulization 2 (two) times a day Rinse mouth after use  cephalexin (KEFLEX) 500 mg capsule   Yes No   Sig: Take 500 mg by mouth  4 capsules 1 hour before surgery   docusate sodium (COLACE) 100 mg capsule   Yes Yes   Sig: Take 100 mg by mouth as needed for constipation  famotidine (PEPCID) 20 mg tablet   Yes Yes   Sig: Take 20 mg by mouth daily  furosemide (LASIX) 40 mg tablet   Yes Yes   Sig: Take 40 mg by mouth 2 (two) times a day  glimepiride (AMARYL) 1 mg tablet   Yes Yes   Sig: Take 1 mg by mouth every morning before breakfast  1/2 tablet   latanoprost (XALATAN) 0 005 % ophthalmic solution   Yes Yes   Si drop daily at bedtime  metolazone (ZAROXOLYN) 5 mg tablet   Yes Yes   Sig: Take 5 mg by mouth daily   metoprolol succinate (TOPROL-XL) 25 mg 24 hr tablet   Yes Yes   Sig: Take 25 mg by mouth daily  senna (SENOKOT) 8 6 MG tablet   Yes Yes   Sig: Take 1 tablet by mouth as needed for constipation  simvastatin (ZOCOR) 20 mg tablet   Yes Yes   Sig: Take 20 mg by mouth daily at bedtime     spironolactone (ALDACTONE) 25 mg tablet   Yes Yes   Sig: Take 25 mg by mouth daily   umeclidinium-vilanterol (ANORO ELLIPTA) 62 5-25 MCG/INH inhaler   Yes Yes   Sig: Inhale 1 puff daily      Facility-Administered Medications: None       Allergies   Allergen Reactions    Plavix [Clopidogrel Bisulfate] GI Bleeding       PHYSICAL EXAM    PE limited by:  nothing    Objective   Vitals:   First set: Temperature: (!) 96 3 °F (35 7 °C) (07/01/18 0732)  Pulse: 72 (07/01/18 0732)  Respirations: 20 (07/01/18 0732)  Blood Pressure: 126/58 (07/01/18 0732)  SpO2: 95 % (07/01/18 0744)    Primary Survey:   (A) Airway:  patent  (B) Breathing:  Wheezing bilateral  (C) Circulation: Pulses:   normal  (D) Disabliity:  GCS Total:  15  (E) Expose:  Completed    Secondary Survey: (Click on Physical Exam tab above)  Physical Exam   Constitutional: He is oriented to person, place, and time  He appears well-developed and well-nourished  No distress  HENT:   Head: Normocephalic and atraumatic  Right Ear: External ear normal    Left Ear: External ear normal    Eyes: EOM are normal  Pupils are equal, round, and reactive to light  No scleral icterus  Neck: Neck supple  No tracheal deviation present  No midline cervical tenderness   Cardiovascular: Normal rate and intact distal pulses  Exam reveals no gallop  Pulmonary/Chest: No stridor  He has wheezes  He has no rales  Abdominal: Soft  Bowel sounds are normal  He exhibits no distension  There is no tenderness  There is no rebound and no guarding  Musculoskeletal: He exhibits edema  He exhibits no deformity  Contusion to the right flank area no midline tenderness   Neurological: He is alert and oriented to person, place, and time  No cranial nerve deficit  Skin: Skin is warm and dry  Psychiatric: He has a normal mood and affect  His behavior is normal    Nursing note and vitals reviewed        Invasive Devices          No matching active lines, drains, or airways          Lab Results:   Results Reviewed     Procedure Component Value Units Date/Time    UA w Reflex to Microscopic w Reflex to Culture [[id number]]     Lab Status:  No result Specimen: Urine     CBC and differential [[id number]]  (Abnormal) Collected:  07/01/18 0744    Lab Status:  Final result Specimen:  Blood from Arm, Right Updated:  07/01/18 0822     WBC 15 45 (H) Thousand/uL      RBC 2 79 (L) Million/uL      Hemoglobin 7 5 (L) g/dL      Hematocrit 25 6 (L) %      MCV 92 fL      MCH 26 9 pg      MCHC 29 3 (L) g/dL      RDW 15 4 (H) %      MPV 10 0 fL      Platelets 960 Thousands/uL     Comprehensive metabolic panel [[id number]]  (Abnormal) Collected:  07/01/18 0744    Lab Status:  Final result Specimen:  Blood from Arm, Right Updated:  07/01/18 0813     Sodium 138 mmol/L      Potassium 4 8 mmol/L      Chloride 95 (L) mmol/L      CO2 31 mmol/L      Anion Gap 12 mmol/L       (H) mg/dL      Creatinine 2 81 (H) mg/dL      Glucose 342 (H) mg/dL      Calcium 8 0 (L) mg/dL      AST 27 U/L      ALT 21 U/L      Alkaline Phosphatase 70 U/L      Total Protein 7 4 g/dL      Albumin 3 3 (L) g/dL      Total Bilirubin 0 40 mg/dL      eGFR 19 ml/min/1 73sq m     Narrative:         National Kidney Disease Education Program recommendations are as follows:  GFR calculation is accurate only with a steady state creatinine  Chronic Kidney disease less than 60 ml/min/1 73 sq  meters  Kidney failure less than 15 ml/min/1 73 sq  meters  Jazmin Ramirez [[id number]]  (Normal) Collected:  07/01/18 0744    Lab Status:  Final result Specimen:  Blood from Arm, Right Updated:  07/01/18 0807     Protime 14 2 seconds      INR 1 17    APTT [[id number]]  (Normal) Collected:  07/01/18 0744    Lab Status:  Final result Specimen:  Blood from Arm, Right Updated:  07/01/18 0807     PTT 26 seconds                  Imaging Studies:   CT cervical spine without contrast   Final Result by Brent Simmons MD (07/01 0912)      No cervical spine fracture or traumatic malalignment  Workstation performed: [id number]         CT spine lumbar without contrast   Final Result by Brent Simmons MD (07/01 6953)         1    No acute osseous abnormality  2   Multilevel degenerative disc disease as described above  Workstation performed: [id number]         CT recon only thoracic spine   Final Result by Nell Ruby MD (07/01 7603)      No fracture or traumatic subluxation  Workstation performed: [id number]         CT chest abdomen pelvis wo contrast   Final Result by Nlel Ruby MD (07/01 6315)         1  No acute injury within the chest, abdomen, or pelvis  2   Scattered subcentimeter pulmonary nodules, largest measuring 6 mm  Based on current Fleischner Society 2017 Guidelines on incidental pulmonary nodule, no routine follow-up is needed if the patient is considered low risk for lung cancer  If the    patient is considered high risk for lung cancer, 12 month follow-up non-contrast chest CT is recommended  3   Small right pleural effusion and bibasilar atelectasis  Workstation performed: [id number]         CT head without contrast   Final Result by Ольга Brady MD (07/01 4049)      No acute intracranial abnormality  Microangiopathic changes  Small posterior scalp hematoma  Workstation performed: [id number]             Other Studies:  Basic labs    Code Status: No Order  Advance Directive and Living Will:      Power of :    POLST:      Procedures  ECG 12 Lead Documentation  Date/Time: 7/1/2018 8:20 AM  Performed by: Debo Lara  Authorized by: Debo Lara     ECG reviewed by me, the ED Provider: yes    Patient location:  ED  Rhythm:     Rhythm: paced    T waves:     T waves: normal             Phone Contacts  ED Phone Contact     ED Course  ED Course as of Jul 01 0953   Sun Jul 01, 2018   4501  Obtain records obtain it appears that his last BUN was 64 and creatinine 1 55 from May   Family states that he has a history of congestive heart failure and is on diuretics also he has a history of colon cancer and anemia but his hemoglobin they state is usually greater than 10 he had colon resection at age 39 rectal exam here shows brown stool which does heme test positive  He is followed by a pulmonologist Dr Yousif Rose    I did  inform them of the pulmonary nodule there is no family history of lung cancer he stop smoking many years ago I instructed them to follow up with the pulmonologist           [facility]  Number of Diagnoses or Management Options  Diagnosis management comments:  Fall with right flank contusion will check basic labs and CT scan       Amount and/or Complexity of Data Reviewed  Clinical lab tests: ordered  Tests in the radiology section of CPT®: ordered      CritCare Time    Disposition  Final diagnoses:   Renal failure   Anemia   Pulmonary nodule   Contusion of flank   Heme positive stool     Time reflects when diagnosis was documented in both MDM as applicable and the Disposition within this note     Time User Action Codes Description Comment    7/1/2018  9:30 AM Cherylann Schirmer Add [N19] Renal failure     7/1/2018  9:30 AM [place] Tobar [D64 9] Anemia     7/1/2018  9:31 AM Cherylann Schirmer Add [R91 1] Pulmonary nodule     7/1/2018  9:31 AM Cherylann Schirmer Add [S30 1XXA] Contusion of flank     7/1/2018  9:31 AM Cherylann Schirmer Add [R19 5] Heme positive stool       ED Disposition     ED Disposition Condition Comment    Admit  Case was discussed with *Dr Juvencio Lamar** and the patient's admission status was agreed to be Admission Status: inpatient status to the service of Dr Juvencio Lamar   Follow-up Information    None       Patient's Medications   Discharge Prescriptions    No medications on file     No discharge procedures on file        ED Provider  Electronically Signed by         Allen Sosa DO  07/01/18 5853

## 2018-07-01 NOTE — H&P
History and Physical  Lawebony Antonio 80 y o  male MRN: [id number]  Unit/Bed#: -01 Encounter: [id number]    Admitting Diagnosis:   Principal Problem:    Melena  Active Problems:    Acute kidney injury ([address] )    Chronic kidney disease, stage 3    Diabetes mellitus (HCC)    CHF (congestive heart failure) (HCC)    Acute blood loss anemia    Chronic respiratory failure with hypoxia (HCC)    Hypertension    COPD (chronic obstructive pulmonary disease) (HCC)    Contusion of flank    Hyperlipidemia  Resolved Problems:    * No resolved hospital problems  *      Plan:  Admit to step-down unit as inpatient status  · Melena/GI bleed  · Acute blood loss anemia secondary to above  Monitor H&H q 6 hours and transfuse as needed  Will start on Protonix 40 mg IV q 12 hours  DC aspirin  Avoid antiplatelets/anticoagulants  GI consult  Will start on clear liquid diet  NPO after midnight for possible endoscopy in a m  IV fluids  · Acute kidney injury on chronic kidney disease stage 3  Patient's baseline creatinine is 1 52  Avoid hypotension/nephrotoxins medications/NSAIDs  Will hold diuretics for now  · Chronic CHF-likely diastolic  · Hypertension and hyperlipidemia  Hold diuretics secondary to acute kidney injury  Patient is on Zaroxolyn, Lasix and spironolactone  Continue on Toprol-XL and statin  · Chronic respiratory failure with hypoxia/COPD  Continue on home oxygen at 2 L via nasal  Continue on bronchodilators  · Diabetes mellitus  Accu-Chek a c  HS with Humalog sliding scale  · GI/DVT prophylaxis  · Discussed with patient and family in detail  Anticipated length of stay greater than 2 midnights  Chief Complaint   Patient presents with    Fall     Patient sent in from [facility] after falling out of his wheelchair today  Staff states this is the 3rd time he has done this in 2 days    Patietn initially complaisned of back pain but none upon arrival        HPI:  Birdie Antonio is a 80 y o  male who presented presented to the emergency department from [facility] with a fall  As per patient he was going to the bathroom when he lost his balance and fell  He denies any loss of consciousness or head injury  He was sent to ER with right flank contusion but patient denies having any pain at this time  Patient has chronic respiratory failure and uses 2 L of oxygen at the facility  As per family patient has been declining over the last 3-4 weeks  Patient has been complaining of dyspnea on exertion, generalized weakness and tiredness  In ER patient was found to have a hemoglobin of 7 5 and creatinine of 2 81  Patient's last blood work was in February 2018 and he had a hemoglobin of 12 4 and creatinine 1 52  Upon further questioning patient did admit to having black tarry stools for the last few days  Denies having any bright red blood per rectum or hematemesis  Patient denies having any chest pain, back pain, neck pain, headache or any weakness in the extremities  Denies any fever or chills  Denies any abdominal pain, nausea and vomiting  Historical Information   Past Medical History:   Diagnosis Date    Aortic regurgitation     Atrial fibrillation (HCC)     Cancer (HCC)     colon    CHF (congestive heart failure) (HCC)     Complete heart block (HCC)     post CABG    COPD (chronic obstructive pulmonary disease) (HCC)     Coronary artery disease     MI    Diabetes mellitus ([facility] )     Dissecting aortic aneurysm (HCC)     Endocarditis     Hx of bleeding disorder     GI bleed on aspirin    Hypertension     Mitral regurgitation     Skin cancer      Past Surgical History:   Procedure Laterality Date    AORTIC VALVE REPLACEMENT      CARDIAC PACEMAKER PLACEMENT      [employer]     CARDIAC SURGERY      CABG on physician notes, none per pt      CHOLECYSTECTOMY      COLECTOMY      FLAP LOCAL HEAD / NECK N/A 5/19/2016    Procedure: VERSUS FLAP ;  Surgeon: Boris Joy MD;  Location: [facility];  Service:     JOINT REPLACEMENT Left     hip    MITRAL VALVE REPLACEMENT      MOHS RECONSTRUCTION N/A 5/19/2016    Procedure: RECONSTRUCTION MOHS DEFECT NOSE AND SCALP;  Surgeon: Sharon Valdivia MD;  Location:  MAIN OR;  Service:    [facility] SKIN BIOPSY       Social History   History   Alcohol Use    Yes     Comment: rarely     History   Drug Use No     History   Smoking Status    Former Smoker   Smokeless Tobacco    Former User    Quit date: 1970     History reviewed  No pertinent family history  Meds/Allergies   Allergies   Allergen Reactions    Plavix [Clopidogrel Bisulfate] GI Bleeding       Meds:    Current Facility-Administered Medications:     sodium chloride 0 9 % infusion, 100 mL/hr, Intravenous, Continuous, Allen Boards, DO, Last Rate: 100 mL/hr at 07/01/18 1015, 100 mL/hr at 07/01/18 1015    Prescriptions Prior to Admission   Medication    acetaminophen (TYLENOL) 650 mg suppository    albuterol (2 5 mg/3 mL) 0 083 % nebulizer solution    aspirin 81 MG tablet    budesonide (PULMICORT) 0 5 mg/2 mL nebulizer solution    docusate sodium (COLACE) 100 mg capsule    famotidine (PEPCID) 20 mg tablet    furosemide (LASIX) 40 mg tablet    glimepiride (AMARYL) 1 mg tablet    latanoprost (XALATAN) 0 005 % ophthalmic solution    metolazone (ZAROXOLYN) 5 mg tablet    metoprolol succinate (TOPROL-XL) 25 mg 24 hr tablet    polyethylene glycol (MIRALAX) 17 g packet    [START ON 7/2/2018] predniSONE 10 mg tablet    senna (SENOKOT) 8 6 MG tablet    simvastatin (ZOCOR) 20 mg tablet    sodium chloride (OCEAN) 0 65 % nasal spray    spironolactone (ALDACTONE) 25 mg tablet    umeclidinium-vilanterol (ANORO ELLIPTA) 62 5-25 MCG/INH inhaler    acetaminophen-codeine (TYLENOL #3) 300-30 mg per tablet    cephalexin (KEFLEX) 500 mg capsule         Review of Systems   Constitutional: Positive for activity change, appetite change and fatigue  Generalized weakness   HENT: Negative  Eyes: Negative  Respiratory: Positive for shortness of breath  Cardiovascular: Negative  Gastrointestinal: Negative  Anal bleeding: Melena  Endocrine: Negative  Genitourinary: Negative  Musculoskeletal: Negative  Skin: Negative  Allergic/Immunologic: Negative  Neurological: Negative  Hematological: Negative  Psychiatric/Behavioral: Negative  Current Vitals:   Blood Pressure: 120/58 (07/01/18 1047)  Pulse: 85 (07/01/18 1047)  Temperature: 99 6 °F (37 6 °C) (07/01/18 1047)  Temp Source: Axillary (07/01/18 1047)  Respirations: 18 (07/01/18 1047)  Height: 5' 6" (167 6 cm) (07/01/18 1045)  Weight - Scale: 80 6 kg (177 lb 11 1 oz) (07/01/18 1045)  SpO2: 96 % (07/01/18 1047)  SPO2 RA Rest      ED to Hosp-Admission (Current) from 7/1/2018 in [address] [place] [address] Intensive Care Unit   SpO2  96 %   SpO2 Activity  At Rest   O2 Device  Nasal cannula   O2 Flow Rate  2 L/min        No intake or output data in the 24 hours ending 07/01/18 1140  Body mass index is 28 68 kg/m²  Physical Exam   Constitutional: He is oriented to person, place, and time  He appears well-developed and well-nourished  No distress  HENT:   Head: Normocephalic and atraumatic  Mouth/Throat: Oropharynx is clear and moist    Eyes: Conjunctivae and EOM are normal  Pupils are equal, round, and reactive to light  No scleral icterus  Neck: Normal range of motion  Neck supple  No JVD present  Cardiovascular: Normal rate, regular rhythm, normal heart sounds and intact distal pulses  Pulmonary/Chest: Effort normal and breath sounds normal  He has no wheezes  He has no rales  Abdominal: Soft  Bowel sounds are normal  There is no tenderness  There is no rebound and no guarding  Genitourinary: Rectal exam shows guaiac positive stool  Musculoskeletal: Normal range of motion  He exhibits no edema, tenderness or deformity  Neurological: He is alert and oriented to person, place, and time   No cranial nerve deficit  No focal deficits   Skin: Skin is warm  No rash noted  No erythema  Psychiatric: His behavior is normal  Judgment normal    Nursing note and vitals reviewed  Lab Results:   CBC:   Lab Results   Component Value Date    WBC 15 45 (H) 07/01/2018    HGB 7 5 (L) 07/01/2018    HCT 25 6 (L) 07/01/2018    MCV 92 07/01/2018     07/01/2018    MCH 26 9 07/01/2018    MCHC 29 3 (L) 07/01/2018    RDW 15 4 (H) 07/01/2018    MPV 10 0 07/01/2018    NRBC 1 07/01/2018     CMP:  Lab Results   Component Value Date     07/01/2018    CL 95 (L) 07/01/2018    CO2 31 07/01/2018    ANIONGAP 12 07/01/2018     (H) 07/01/2018    CREATININE 2 81 (H) 07/01/2018    GLUCOSE 342 (H) 07/01/2018    CALCIUM 8 0 (L) 07/01/2018    AST 27 07/01/2018    ALT 21 07/01/2018    ALKPHOS 70 07/01/2018    PROT 7 4 07/01/2018    BILITOT 0 40 07/01/2018    EGFR 19 07/01/2018     No results found for: TROPONINI, CKMB, CKTOTAL  Coagulation:   Lab Results   Component Value Date    INR 1 17 07/01/2018    Urinalysis:No results found for: Ivan Saber, SPECGRAV, PHUR, LEUKOCYTESUR, NITRITE, PROTEINUA, GLUCOSEU, KETONESU, BILIRUBINUR, BLOODU   Amylase: No results found for: AMYLASE  Lipase: No results found for: LIPASE     Imaging: Ct Chest Abdomen Pelvis Wo Contrast    Result Date: 7/1/2018  Narrative: CT CHEST, ABDOMEN AND PELVIS WITHOUT IV CONTRAST INDICATION:   fall  COMPARISON: None  TECHNIQUE: CT examination of the chest, abdomen and pelvis was performed without intravenous contrast   Axial, sagittal, and coronal 2D reformatted images were created from the source data and submitted for interpretation  Radiation dose length product (DLP) for this visit:  1532 26 mGy-cm   This examination, like all CT scans performed in the [facility], was performed utilizing techniques to minimize radiation dose exposure, including the use of iterative reconstruction and automated exposure control   Enteric contrast was not administered  FINDINGS: CHEST LUNGS: Scattered subcentimeter pulmonary nodules are present  For reference, the largest within the right lung is in the upper lobe and measures 3 mm (series 3 image 20)  The largest within the left lung is in the upper lobe measures 6 mm of (series 3 image 22)  Emphysema is present  PLEURA:  Small right pleural effusion and bibasilar atelectasis are present  HEART/GREAT VESSELS:  Pacemaker leads are present within the right atrium and right ventricular apex  There is no pericardial effusion  MEDIASTINUM AND HARMEET:  Unremarkable  CHEST WALL AND LOWER NECK:   Unremarkable  ABDOMEN LIVER/BILIARY TREE:  Unremarkable  GALLBLADDER:  Gallbladder is surgically absent  SPLEEN:  Unremarkable  PANCREAS:  Unremarkable  ADRENAL GLANDS:  Unremarkable  KIDNEYS/URETERS:  There is an 8 mm hemorrhagic cyst within the upper pole of the left kidney  There is no hydronephrosis  STOMACH AND BOWEL:  Unremarkable  APPENDIX:  No findings to suggest appendicitis  ABDOMINOPELVIC CAVITY:  No ascites or free intraperitoneal air  No lymphadenopathy  VESSELS:  Unremarkable for patient's age  PELVIS REPRODUCTIVE ORGANS:  Unremarkable for patient's age  URINARY BLADDER:  Unremarkable  ABDOMINAL WALL/INGUINAL REGIONS:  Unremarkable  OSSEOUS STRUCTURES:  No acute fracture or destructive osseous lesion  Hardware within the left hip is present  Moderate multilevel degenerative disc disease is present  Impression: 1  No acute injury within the chest, abdomen, or pelvis  2   Scattered subcentimeter pulmonary nodules, largest measuring 6 mm  Based on current Fleischner Society 2017 Guidelines on incidental pulmonary nodule, no routine follow-up is needed if the patient is considered low risk for lung cancer  If the patient is considered high risk for lung cancer, 12 month follow-up non-contrast chest CT is recommended  3   Small right pleural effusion and bibasilar atelectasis   Workstation performed: [id number]     Ct Head Without Contrast    Result Date: 7/1/2018  Narrative: CT BRAIN - WITHOUT CONTRAST INDICATION:   fall  COMPARISON:  None  TECHNIQUE:  CT examination of the brain was performed  In addition to axial images, coronal 2D reformatted images were created and submitted for interpretation  Radiation dose length product (DLP) for this visit:  1119 52 mGy-cm   This examination, like all CT scans performed in the [facility], was performed utilizing techniques to minimize radiation dose exposure, including the use of iterative reconstruction and automated exposure control  IMAGE QUALITY:  Diagnostic  FINDINGS: PARENCHYMA: Decreased attenuation is noted in periventricular and subcortical white matter demonstrating an appearance that is statistically most likely to represent moderate microangiopathic change  No CT signs of acute infarction  No intracranial mass, mass effect or midline shift  No acute parenchymal hemorrhage  VENTRICLES AND EXTRA-AXIAL SPACES:  Normal for the patient's age  VISUALIZED ORBITS AND PARANASAL SINUSES:  No acute abnormality involving the orbits  Mild scattered sinus mucosal thickening is noted  No fluid levels are seen  CALVARIUM AND EXTRACRANIAL SOFT TISSUES:  Small posterior scalp hematoma with no underlying skull fracture noted  Impression: No acute intracranial abnormality  Microangiopathic changes  Small posterior scalp hematoma  Workstation performed: [id number]     Ct Cervical Spine Without Contrast    Result Date: 7/1/2018  Narrative: CT CERVICAL SPINE - WITHOUT CONTRAST INDICATION:   fall  COMPARISON: None  TECHNIQUE:  CT examination of the cervical spine was performed without intravenous contrast   Contiguous axial images were obtained  Sagittal and coronal reconstructions were performed  Radiation dose length product (DLP) for this visit:  555 52 mGy-cm     This examination, like all CT scans performed in the [facility], was performed utilizing techniques to minimize radiation dose exposure, including the use of iterative  reconstruction and automated exposure control  IMAGE QUALITY:  Diagnostic  FINDINGS: ALIGNMENT:  Normal alignment of the cervical spine  No subluxation  VERTEBRAL BODIES:  No fracture  DEGENERATIVE CHANGES:  Severe multilevel cervical degenerative changes are noted  No critical central canal stenosis  PREVERTEBRAL AND PARASPINAL SOFT TISSUES:  Unremarkable  THORACIC INLET:  Biapical pleural-parenchymal scarring is seen and apical emphysematous changes are noted  Impression: No cervical spine fracture or traumatic malalignment  Workstation performed: [id number]     Ct Spine Lumbar Without Contrast    Result Date: 7/1/2018  Narrative: CT LUMBAR SPINE INDICATION:   fall  COMPARISON: None  TECHNIQUE:  Contiguous axial images through the lumbar spine were obtained  Sagittal and coronal reconstructions were performed  Radiation dose length product (DLP) for this visit:  0 mGy-cm   This examination, like all CT scans performed in the [facility], was performed utilizing techniques to minimize radiation dose exposure, including the use of iterative reconstruction and automated exposure control  IMAGE QUALITY:  Diagnostic  FINDINGS: ALIGNMENT:  Normal alignment of the lumbar spine  No spondylolisthesis or spondylolysis  VERTEBRAL BODIES:  No fracture  No lytic or blastic lesion  DEGENERATIVE CHANGES: Moderate to severe multilevel degenerative disc disease is present  There is mild central canal stenosis at L1-L2 and moderate stenosis at L2-L3 and L3-L4  No critical central canal stenosis present  PARASPINAL SOFT TISSUES:   Normal      Impression: 1  No acute osseous abnormality  2   Multilevel degenerative disc disease as described above   Workstation performed: [id number]     Ct Recon Only Thoracic Spine    Result Date: 7/1/2018  Narrative: CT THORACIC SPINE INDICATION: fall  COMPARISON:  No prior examinations are available for comparison  TECHNIQUE: Axial CT examination of the thoracic spine was obtained utilizing reconstructed images from CT of the chest abdomen and pelvis performed the same day  Images were reformatted in the sagittal and coronal planes  This examination, like all CT scans performed in the [facility], was performed utilizing techniques to minimize radiation dose exposure, including the use of iterative reconstruction and automated exposure control  FINDINGS: ALIGNMENT: Normal alignment of thoracic vertebral bodies  No subluxation  VERTEBRAL BODIES: No fracture  DEGENERATIVE CHANGES: Moderate multilevel degenerative disc disease is present  PREVERTEBRAL AND PARASPINAL SOFT TISSUES: Normal      Impression: No fracture or traumatic subluxation  Workstation performed: [id number]     EKG, Pathology, and Other Studies: I have personally reviewed the results  VTE Pharmacologic Prophylaxis: Reason for no pharmacologic prophylaxis GI bleed  VTE Mechanical Prophylaxis: sequential compression device    Code Status: No Order    Counseling / Coordination of Care  Total floor / unit time spent today 75 minutes  Greater than 50% of total time was spent with the patient and / or family counseling and / or coordination of care       "This note has been constructed using a voice recognition system"      Chan Bose MD  7/1/2018, 11:40 AM

## 2018-07-02 ENCOUNTER — APPOINTMENT (INPATIENT)
Dept: ULTRASOUND IMAGING | Facility: HOSPITAL | Age: 83
DRG: 377 | End: 2018-07-02
Payer: COMMERCIAL

## 2018-07-02 ENCOUNTER — ANESTHESIA (INPATIENT)
Dept: PERIOP | Facility: HOSPITAL | Age: 83
DRG: 377 | End: 2018-07-02
Payer: COMMERCIAL

## 2018-07-02 ENCOUNTER — ANESTHESIA EVENT (INPATIENT)
Dept: PERIOP | Facility: HOSPITAL | Age: 83
DRG: 377 | End: 2018-07-02
Payer: COMMERCIAL

## 2018-07-02 ENCOUNTER — APPOINTMENT (INPATIENT)
Dept: NON INVASIVE DIAGNOSTICS | Facility: HOSPITAL | Age: 83
DRG: 377 | End: 2018-07-02
Payer: COMMERCIAL

## 2018-07-02 PROBLEM — N40.1 BENIGN PROSTATIC HYPERPLASIA WITH URINARY RETENTION: Status: ACTIVE | Noted: 2018-07-02

## 2018-07-02 PROBLEM — R33.8 BENIGN PROSTATIC HYPERPLASIA WITH URINARY RETENTION: Status: ACTIVE | Noted: 2018-07-02

## 2018-07-02 LAB
ALBUMIN SERPL BCP-MCNC: 2.8 G/DL (ref 3.5–5)
ALP SERPL-CCNC: 53 U/L (ref 46–116)
ALT SERPL W P-5'-P-CCNC: 20 U/L (ref 12–78)
ANION GAP SERPL CALCULATED.3IONS-SCNC: 8 MMOL/L (ref 4–13)
AST SERPL W P-5'-P-CCNC: 22 U/L (ref 5–45)
BASOPHILS # BLD AUTO: 0.02 THOUSANDS/ΜL (ref 0–0.1)
BASOPHILS NFR BLD AUTO: 0 % (ref 0–1)
BILIRUB SERPL-MCNC: 0.8 MG/DL (ref 0.2–1)
BUN SERPL-MCNC: 99 MG/DL (ref 5–25)
CALCIUM SERPL-MCNC: 7.8 MG/DL (ref 8.3–10.1)
CHLORIDE SERPL-SCNC: 102 MMOL/L (ref 100–108)
CO2 SERPL-SCNC: 32 MMOL/L (ref 21–32)
CREAT SERPL-MCNC: 2.56 MG/DL (ref 0.6–1.3)
EOSINOPHIL # BLD AUTO: 0.05 THOUSAND/ΜL (ref 0–0.61)
EOSINOPHIL NFR BLD AUTO: 1 % (ref 0–6)
ERYTHROCYTE [DISTWIDTH] IN BLOOD BY AUTOMATED COUNT: 14.9 % (ref 11.6–15.1)
FERRITIN SERPL-MCNC: 47 NG/ML (ref 8–388)
GFR SERPL CREATININE-BSD FRML MDRD: 21 ML/MIN/1.73SQ M
GLUCOSE SERPL-MCNC: 135 MG/DL (ref 65–140)
GLUCOSE SERPL-MCNC: 146 MG/DL (ref 65–140)
GLUCOSE SERPL-MCNC: 173 MG/DL (ref 65–140)
GLUCOSE SERPL-MCNC: 178 MG/DL (ref 65–140)
GLUCOSE SERPL-MCNC: 229 MG/DL (ref 65–140)
GLUCOSE SERPL-MCNC: 308 MG/DL (ref 65–140)
HCT VFR BLD AUTO: 24.4 % (ref 36.5–49.3)
HCT VFR BLD AUTO: 24.9 % (ref 36.5–49.3)
HGB BLD-MCNC: 7.6 G/DL (ref 12–17)
HGB BLD-MCNC: 7.8 G/DL (ref 12–17)
IMM GRANULOCYTES # BLD AUTO: 0.06 THOUSAND/UL (ref 0–0.2)
IMM GRANULOCYTES NFR BLD AUTO: 1 % (ref 0–2)
IRON SATN MFR SERPL: 4 %
IRON SERPL-MCNC: 18 UG/DL (ref 65–175)
LYMPHOCYTES # BLD AUTO: 0.81 THOUSANDS/ΜL (ref 0.6–4.47)
LYMPHOCYTES NFR BLD AUTO: 9 % (ref 14–44)
MAGNESIUM SERPL-MCNC: 2.3 MG/DL (ref 1.6–2.6)
MCH RBC QN AUTO: 28.3 PG (ref 26.8–34.3)
MCHC RBC AUTO-ENTMCNC: 31.1 G/DL (ref 31.4–37.4)
MCV RBC AUTO: 91 FL (ref 82–98)
MONOCYTES # BLD AUTO: 0.77 THOUSAND/ΜL (ref 0.17–1.22)
MONOCYTES NFR BLD AUTO: 8 % (ref 4–12)
MRSA NOSE QL CULT: NORMAL
NEUTROPHILS # BLD AUTO: 7.87 THOUSANDS/ΜL (ref 1.85–7.62)
NEUTS SEG NFR BLD AUTO: 81 % (ref 43–75)
NRBC BLD AUTO-RTO: 0 /100 WBCS
NT-PROBNP SERPL-MCNC: 8964 PG/ML
PHOSPHATE SERPL-MCNC: 4.4 MG/DL (ref 2.3–4.1)
PLATELET # BLD AUTO: 216 THOUSANDS/UL (ref 149–390)
PMV BLD AUTO: 10.2 FL (ref 8.9–12.7)
POTASSIUM SERPL-SCNC: 3.9 MMOL/L (ref 3.5–5.3)
PROT SERPL-MCNC: 6.2 G/DL (ref 6.4–8.2)
RBC # BLD AUTO: 2.69 MILLION/UL (ref 3.88–5.62)
SODIUM SERPL-SCNC: 142 MMOL/L (ref 136–145)
TIBC SERPL-MCNC: 423 UG/DL (ref 250–450)
WBC # BLD AUTO: 9.58 THOUSAND/UL (ref 4.31–10.16)

## 2018-07-02 PROCEDURE — 84100 ASSAY OF PHOSPHORUS: CPT | Performed by: INTERNAL MEDICINE

## 2018-07-02 PROCEDURE — 94760 N-INVAS EAR/PLS OXIMETRY 1: CPT

## 2018-07-02 PROCEDURE — 93306 TTE W/DOPPLER COMPLETE: CPT | Performed by: INTERNAL MEDICINE

## 2018-07-02 PROCEDURE — 99232 SBSQ HOSP IP/OBS MODERATE 35: CPT | Performed by: INTERNAL MEDICINE

## 2018-07-02 PROCEDURE — 88342 IMHCHEM/IMCYTCHM 1ST ANTB: CPT | Performed by: PATHOLOGY

## 2018-07-02 PROCEDURE — 76770 US EXAM ABDO BACK WALL COMP: CPT

## 2018-07-02 PROCEDURE — 97163 PT EVAL HIGH COMPLEX 45 MIN: CPT

## 2018-07-02 PROCEDURE — 87077 CULTURE AEROBIC IDENTIFY: CPT | Performed by: INTERNAL MEDICINE

## 2018-07-02 PROCEDURE — 88313 SPECIAL STAINS GROUP 2: CPT | Performed by: PATHOLOGY

## 2018-07-02 PROCEDURE — 94640 AIRWAY INHALATION TREATMENT: CPT

## 2018-07-02 PROCEDURE — 88341 IMHCHEM/IMCYTCHM EA ADD ANTB: CPT | Performed by: PATHOLOGY

## 2018-07-02 PROCEDURE — 82728 ASSAY OF FERRITIN: CPT | Performed by: INTERNAL MEDICINE

## 2018-07-02 PROCEDURE — 0DB68ZX EXCISION OF STOMACH, VIA NATURAL OR ARTIFICIAL OPENING ENDOSCOPIC, DIAGNOSTIC: ICD-10-PCS | Performed by: INTERNAL MEDICINE

## 2018-07-02 PROCEDURE — G8981 BODY POS CURRENT STATUS: HCPCS

## 2018-07-02 PROCEDURE — 85025 COMPLETE CBC W/AUTO DIFF WBC: CPT | Performed by: INTERNAL MEDICINE

## 2018-07-02 PROCEDURE — 83550 IRON BINDING TEST: CPT | Performed by: INTERNAL MEDICINE

## 2018-07-02 PROCEDURE — 80053 COMPREHEN METABOLIC PANEL: CPT | Performed by: INTERNAL MEDICINE

## 2018-07-02 PROCEDURE — 83880 ASSAY OF NATRIURETIC PEPTIDE: CPT | Performed by: INTERNAL MEDICINE

## 2018-07-02 PROCEDURE — 83540 ASSAY OF IRON: CPT | Performed by: INTERNAL MEDICINE

## 2018-07-02 PROCEDURE — 88305 TISSUE EXAM BY PATHOLOGIST: CPT | Performed by: PATHOLOGY

## 2018-07-02 PROCEDURE — C8929 TTE W OR WO FOL WCON,DOPPLER: HCPCS

## 2018-07-02 PROCEDURE — 99233 SBSQ HOSP IP/OBS HIGH 50: CPT | Performed by: INTERNAL MEDICINE

## 2018-07-02 PROCEDURE — G8982 BODY POS GOAL STATUS: HCPCS

## 2018-07-02 PROCEDURE — 82948 REAGENT STRIP/BLOOD GLUCOSE: CPT

## 2018-07-02 PROCEDURE — 83735 ASSAY OF MAGNESIUM: CPT | Performed by: INTERNAL MEDICINE

## 2018-07-02 PROCEDURE — 85014 HEMATOCRIT: CPT | Performed by: INTERNAL MEDICINE

## 2018-07-02 PROCEDURE — C9113 INJ PANTOPRAZOLE SODIUM, VIA: HCPCS | Performed by: INTERNAL MEDICINE

## 2018-07-02 PROCEDURE — 85018 HEMOGLOBIN: CPT | Performed by: INTERNAL MEDICINE

## 2018-07-02 RX ORDER — MUSCLE RUB CREAM 100; 150 MG/G; MG/G
CREAM TOPICAL 4 TIMES DAILY PRN
Status: DISCONTINUED | OUTPATIENT
Start: 2018-07-02 | End: 2018-07-13 | Stop reason: HOSPADM

## 2018-07-02 RX ORDER — ONDANSETRON 2 MG/ML
4 INJECTION INTRAMUSCULAR; INTRAVENOUS ONCE AS NEEDED
Status: DISCONTINUED | OUTPATIENT
Start: 2018-07-02 | End: 2018-07-05

## 2018-07-02 RX ORDER — PROPOFOL 10 MG/ML
INJECTION, EMULSION INTRAVENOUS AS NEEDED
Status: DISCONTINUED | OUTPATIENT
Start: 2018-07-02 | End: 2018-07-02 | Stop reason: SURG

## 2018-07-02 RX ORDER — METOPROLOL SUCCINATE 25 MG/1
25 TABLET, EXTENDED RELEASE ORAL DAILY
Status: DISCONTINUED | OUTPATIENT
Start: 2018-07-03 | End: 2018-07-06

## 2018-07-02 RX ORDER — PANTOPRAZOLE SODIUM 40 MG/1
40 TABLET, DELAYED RELEASE ORAL
Status: DISCONTINUED | OUTPATIENT
Start: 2018-07-02 | End: 2018-07-13 | Stop reason: HOSPADM

## 2018-07-02 RX ADMIN — HYDROCODONE BITARTRATE AND ACETAMINOPHEN 1 TABLET: 5; 325 TABLET ORAL at 10:51

## 2018-07-02 RX ADMIN — METOPROLOL SUCCINATE 25 MG: 25 TABLET, EXTENDED RELEASE ORAL at 08:35

## 2018-07-02 RX ADMIN — SODIUM CHLORIDE 60 ML/HR: 0.9 INJECTION, SOLUTION INTRAVENOUS at 19:08

## 2018-07-02 RX ADMIN — PANTOPRAZOLE SODIUM 40 MG: 40 TABLET, DELAYED RELEASE ORAL at 11:18

## 2018-07-02 RX ADMIN — PRAVASTATIN SODIUM 40 MG: 40 TABLET ORAL at 17:18

## 2018-07-02 RX ADMIN — PERFLUTREN 2 ML/MIN: 6.52 INJECTION, SUSPENSION INTRAVENOUS at 15:19

## 2018-07-02 RX ADMIN — Medication 1 APPLICATION: at 14:17

## 2018-07-02 RX ADMIN — FLUTICASONE PROPIONATE AND SALMETEROL 1 PUFF: 50; 250 POWDER RESPIRATORY (INHALATION) at 19:58

## 2018-07-02 RX ADMIN — INSULIN LISPRO 3 UNITS: 100 INJECTION, SOLUTION INTRAVENOUS; SUBCUTANEOUS at 21:45

## 2018-07-02 RX ADMIN — Medication 1 APPLICATION: at 20:08

## 2018-07-02 RX ADMIN — LATANOPROST 1 DROP: 50 SOLUTION OPHTHALMIC at 21:44

## 2018-07-02 RX ADMIN — HYDROCODONE BITARTRATE AND ACETAMINOPHEN 1 TABLET: 5; 325 TABLET ORAL at 20:08

## 2018-07-02 RX ADMIN — INSULIN LISPRO 1 UNITS: 100 INJECTION, SOLUTION INTRAVENOUS; SUBCUTANEOUS at 13:01

## 2018-07-02 RX ADMIN — HYDROCODONE BITARTRATE AND ACETAMINOPHEN 1 TABLET: 5; 325 TABLET ORAL at 00:51

## 2018-07-02 RX ADMIN — PANTOPRAZOLE SODIUM 40 MG: 40 INJECTION, POWDER, FOR SOLUTION INTRAVENOUS at 08:29

## 2018-07-02 RX ADMIN — BUDESONIDE 0.5 MG: 0.5 INHALANT RESPIRATORY (INHALATION) at 20:00

## 2018-07-02 RX ADMIN — FLUTICASONE PROPIONATE AND SALMETEROL 1 PUFF: 50; 250 POWDER RESPIRATORY (INHALATION) at 07:55

## 2018-07-02 RX ADMIN — PROPOFOL 40 MG: 10 INJECTION, EMULSION INTRAVENOUS at 09:25

## 2018-07-02 RX ADMIN — INSULIN LISPRO 2 UNITS: 100 INJECTION, SOLUTION INTRAVENOUS; SUBCUTANEOUS at 17:18

## 2018-07-02 RX ADMIN — BUDESONIDE 0.5 MG: 0.5 INHALANT RESPIRATORY (INHALATION) at 07:55

## 2018-07-02 RX ADMIN — SODIUM CHLORIDE 60 ML/HR: 0.9 INJECTION, SOLUTION INTRAVENOUS at 00:34

## 2018-07-02 NOTE — ANESTHESIA PREPROCEDURE EVALUATION
Review of Systems/Medical History  Patient summary reviewed  Chart reviewed      Cardiovascular  EKG reviewed, Pacemaker/AICD, Hyperlipidemia, Hypertension controlled, Valve replacement aortic valve  and mitral valve  replacement, CAD , History of CABG, Dysrhythmias , atrial fibrillation, CHF , TRIVEDI,    Pulmonary  COPD moderate- medication dependent ,        GI/Hepatic    GI bleeding , active,        Chronic kidney disease stage 3,        Endo/Other  Diabetes poorly controlled type 2 Insulin,      GYN       Hematology  Anemia acute blood loss anemia,     Musculoskeletal  Osteoarthritis,   Arthritis     Neurology    Diabetic neuropathy,    Psychology   Negative psychology ROS              Physical Exam    Airway    Mallampati score: I         Dental   upper dentures and lower dentures,     Cardiovascular  Cardiovascular exam normal    Pulmonary  Decreased breath sounds,     Other Findings        Anesthesia Plan  ASA Score- 4     Anesthesia Type- IV sedation with anesthesia with ASA Monitors  Additional Monitors:   Airway Plan:         Plan Factors-    Induction- intravenous  Postoperative Plan-     Informed Consent- Anesthetic plan and risks discussed with patient  I personally reviewed this patient with the CRNA  Discussed and agreed on the Anesthesia Plan with the CRNA  Rosella Goldmann

## 2018-07-02 NOTE — PHYSICAL THERAPY NOTE
PHYSICAL THERAPY EVAL       07/02/18 1410   Note Type   Note type Eval only   Pain Assessment   Pain Assessment 0-10   Pain Score 8   Pain Type Acute pain   Pain Location Arm   Pain Orientation Left; Other (Comment)  (Elbow)   Home Living   Type of [address]; Wheelchair-electric   Prior Function   Level of [place] Needs assistance with ADLs and functional mobility   Lives With Facility staff   Receives Help From Other (Comment)  (staff)   ADL Assistance Needs assistance   IADLs Needs assistance   Falls in the last 6 months 1 to 4   Vocational Retired   Comments Over the last week patient has needed more assistance and has only been doing transfers  Prior to this he was ambulating to the dining room  Restrictions/Precautions   Other Precautions Contact/isolation;Multiple lines;Telemetry;O2;Fall Risk;Pain   Cognition   Overall Cognitive Status WFL   Arousal/Participation Alert   Orientation Level Oriented to person;Oriented to place;Oriented to situation   Following Commands Follows one step commands without difficulty   RLE Assessment   RLE Assessment WFL   LLE Assessment   LLE Assessment WFL   Coordination   Sensation WFL   Light Touch   RLE Light Touch Grossly intact   LLE Light Touch Grossly intact   Bed Mobility   Rolling L 4  Minimal assistance   Additional items Assist x 1;HOB elevated;Verbal cues; Bedrails   Supine to Sit 3  Moderate assistance   Additional items Assist x 1;Bedrails;Verbal cues; Increased time required   Sit to Supine 3  Moderate assistance   Additional items Assist x 1;Bedrails;Verbal cues;LE management   Additional Comments Increased time required to complete  posterior lean while seated edge of bed  Sat edge of bed approx 3 minutes with min A for trunk support  Heavy reliance of UE's      Transfers   Sit to Stand Unable to assess   Balance   Static Sitting Fair -   Dynamic Sitting Poor +   Endurance Deficit   Endurance Deficit Yes   Activity Tolerance   Activity Tolerance Patient limited by fatigue;Patient limited by pain   Nurse Made Aware RN Wilmer   Assessment   Prognosis Fair   Problem List Decreased strength;Decreased endurance; Impaired balance;Decreased mobility;Pain   Assessment Fabienne is a 81y/o M who presented on 7/1 after a fall out of his w/c  He was found to have renal failure, anemia and heme positive stool  His hemoglobin is trending down and he had an EGD today  His PMH is significant for DM, CHF, HTN, COPD, HLD, CABG  His medical instabilities include multiple lines, telemetry, vital sign monitoring, pain, fatigue, overall decreased strength, balance, endurance and mobility  He tolerated sitting edge of bed  Excessive amount of time to complete bed mobility  Very fatigued  Not appropriate for transfers at this time  Anticipate rehab at discharge  Goals   Patient Goals To get stronger   STG Expiration Date 07/16/18   Short Term Goal #1 1  Perform bed mobility at a min A level 2  Sit<>stand transfers with min A x 1 3  Amb, 15ft with min A x 1 with a RW   Treatment Day 0   Plan   Treatment/Interventions Functional transfer training;LE strengthening/ROM; Therapeutic exercise; Endurance training;Bed mobility;Gait training;Spoke to nursing;OT   PT Frequency Other (Comment)  (3-5x/wk)   Recommendation   Recommendation Post acute IP rehab   Equipment Recommended Walker   Modified Polk Scale   Modified Polk Scale 4   Barthel Index   Feeding 5   Bathing 0   Grooming Score 0   Dressing Score 0   Bladder Score 5   Bowels Score 5   Toilet Use Score 0   Transfers (Bed/Chair) Score 5   Mobility (Level Surface) Score 0   Stairs Score 0   Barthel Index Score 20   Susan Regalado, PT            Patient Name: Birdie Alvarez  GPXEC'F Date: 7/2/2018

## 2018-07-02 NOTE — SOCIAL WORK
Spoke with Chato Shanks at [facility] at [facility]  Pt uses wheelchair  Pt is assist x 2 for transfers  Pt is independent with feeding  Pt has assist with showering and bathing  Chato Shanks informed  that Pt has been at their [address] [place] [address] since 3/26/18  Spoke with Pt's dtr(Jacquie: [phone]), confirmed Pt has been at [facility] personal care unit  Pt's dtr confirmed Pt uses wheelchair  Pt's dtr informed Pt has had [facility] services in past  Acknowledge PT/OT recommendation for SNF  Pt's family aware and in agreement for referral to [facility] skilled unit  Referral sent to [facility] via [facility]  Will need to obtain insurance approval for SNF rehab prior to discharge  Will follow

## 2018-07-02 NOTE — PROGRESS NOTES
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 and hypertensive sclerosis contributing  3  BPH with urinary retention:   -PVR with urinary retention protocol  Last  ml  Remains with elmroe catheter  4  CHF/volume status:  -Daily weights, strict I/O  -Echo: pending  -Diuretics on hold, continue to monitor volume status  5  HTN: blood pressure is low for age recommendations  Goal 's    -continue metoprolol with hold parameters  6  Anemia: S/P fall with flank hematoma as well as reported dark stools  Received 1 U PRBC's  Hgb level 7 6 and remains stable  Continue to monitor and transfuse as needed  -EGD: moderate-severe gastritis  Other: diabetes, HLD, COPD        SUBJECTIVE:  Mr Jadon Guidry is feeling well this morning  He has returned from his ERCP  He is feeling slightly tired from his procedure  He is currently on 2L NC  He denied any chest pain, increased SOB, N/V/D  He has a elmore catheter in place with clear yellow urine  He has been on a liquid diet since Saturday and has been tolerating this well       OBJECTIVE:  Current Weight: Weight - Scale: 86 6 kg (190 lb 14 7 oz)  Vitals:    07/02/18 0835 07/02/18 0904 07/02/18 0948 07/02/18 1053   BP: 126/59 135/63 113/59 132/59   BP Location:       Pulse: 65 68 68 65   Resp:  16 16 16   Temp:  (!) 97 3 °F (36 3 °C) 98 2 °F (36 8 °C) 98 °F (36 7 °C) TempSrc:  Tympanic     SpO2:  98% 95% 96%   Weight:       Height:           Intake/Output Summary (Last 24 hours) at 07/02/18 1115  Last data filed at 07/02/18 0940   Gross per 24 hour   Intake          2802 37 ml   Output              675 ml   Net          2127 37 ml     General: No apparent distress  Skin: warm, dry, intact, no rash  HEENT: Moist mucous membranes, sclera anicteric, normocephalic  Neck: No apparent JVD appreciated  Chest: Lung sounds clear B/L, on 2 L NC  Heart: Regular rate and rhythm, No murmer  Abdomen: Soft, round, NT, +BS  Large, slightly firm     Extremities: No B/L LE edema present  Neuro: Alert and oriented  Psych: Appropriate mood and affect    Medications:    Current Facility-Administered Medications:     acetaminophen (TYLENOL) tablet 650 mg, 650 mg, Oral, Q6H PRN, Jerrod Henry MD, 650 mg at 07/01/18 2016    albuterol inhalation solution 2 5 mg, 2 5 mg, Nebulization, Q6H PRN, Jerrod Henry MD, 2 5 mg at 07/01/18 1954    budesonide (PULMICORT) inhalation solution 0 5 mg, 0 5 mg, Nebulization, BID, Jerrod Henry MD, 0 5 mg at 07/02/18 0755    fluticasone-salmeterol (ADVAIR) 250-50 mcg/dose inhaler 1 puff, 1 puff, Inhalation, Q12H, Jerrod Henry MD, 1 puff at 07/02/18 0755    HYDROcodone-acetaminophen (NORCO) 5-325 mg per tablet 1 tablet, 1 tablet, Oral, Q6H PRN, Jerrod Henry MD, 1 tablet at 07/02/18 1051    insulin lispro (HumaLOG) 100 units/mL subcutaneous injection 1-5 Units, 1-5 Units, Subcutaneous, HS, ROMAINE Dillon, 1 Units at 07/01/18 2148    insulin lispro (HumaLOG) 100 units/mL subcutaneous injection 1-6 Units, 1-6 Units, Subcutaneous, TID AC, 6 Units at 07/01/18 1649 **AND** Fingerstick Glucose (POCT), , , TID AC, Jerrod Henry MD    latanoprost (XALATAN) 0 005 % ophthalmic solution 1 drop, 1 drop, Both Eyes, HS, Jerrod Henry MD, 1 drop at 07/01/18 0972    menthol-methyl salicylate (BENGAY) 65-09 % cream, , Apply externally, 4x Daily PRN, ROMAINE Rai    metoprolol succinate (TOPROL-XL) 24 hr tablet 25 mg, 25 mg, Oral, Daily, Stella Barbour MD, 25 mg at 07/02/18 0835    ondansetron (ZOFRAN) injection 4 mg, 4 mg, Intravenous, Q6H PRN, Stella Barbour MD    ondansetron (ZOFRAN) injection 4 mg, 4 mg, Intravenous, Once PRN, Abelardo Bashir DO    pantoprazole (PROTONIX) EC tablet 40 mg, 40 mg, Oral, Early Morning, Alethea Graff MD    pravastatin (PRAVACHOL) tablet 40 mg, 40 mg, Oral, Daily With Devon Mederos MD, 40 mg at 07/01/18 1648    sodium chloride 0 9 % infusion, 60 mL/hr, Intravenous, Continuous, Delmer Quezada MD, Stopped at 07/02/18 5408    Laboratory Results:    Results from last 7 days  Lab Units 07/02/18  0503 07/02/18  0502 07/02/18  0032 07/01/18  1923 07/01/18  1237 07/01/18  0744   WBC Thousand/uL  --  9 58  --   --   --  15 45*   HEMOGLOBIN g/dL  --  7 6* 7 8* 7 8* 7 4* 7 5*   HEMATOCRIT %  --  24 4* 24 9* 24 9* 24 4* 25 6*   PLATELETS Thousands/uL  --  216  --   --   --  286   SODIUM mmol/L 142  --   --  139  --  138   POTASSIUM mmol/L 3 9  --   --  4 2  --  4 8   CHLORIDE mmol/L 102  --   --  100  --  95*   CO2 mmol/L 32  --   --  33*  --  31   BUN mg/dL 99*  --   --  103*  --  103*   CREATININE mg/dL 2 56*  --   --  2 52*  --  2 81*   CALCIUM mg/dL 7 8*  --   --  8 2*  --  8 0*   MAGNESIUM mg/dL 2 3  --   --   --   --   --    PHOSPHORUS mg/dL 4 4*  --   --   --   --   --    TOTAL PROTEIN g/dL 6 2*  --   --   --   --  7 4   GLUCOSE RANDOM mg/dL 135  --   --  208*  --  342*

## 2018-07-02 NOTE — ASSESSMENT & PLAN NOTE
No results found for: HGBA1C    Recent Labs      07/01/18   1153  07/01/18   1611  07/01/18   2101  07/02/18   0609   POCGLU  320*  374*  194*  146*       Blood Sugar Average: Last 72 hrs:  (P) 258 5     Fasting blood sugar is 146    Baseline creatinine 1 5

## 2018-07-02 NOTE — ASSESSMENT & PLAN NOTE
Patient's hemoglobin decreased to 7 6, this for the element of hemodilution as well    Will monitor hemoglobin q 6 hours

## 2018-07-02 NOTE — PROGRESS NOTES
Progress Note - Jet Hartman 3/15/1927, 80 y o  male MRN: [id number]    Unit/Bed#: -01 Encounter: [id number]    Primary Care Provider: Destini Feliciano MD   Date and time admitted to hospital: 7/1/2018  7:23 AM        * Melena   Assessment & Plan    I suspect patient has an upper GI bleed  Will continue IV Protonix and patient is scheduled for EGD today  Case was discussed with patient's daughter and son at the bedside in detail        Acute blood loss anemia   Assessment & Plan    Patient's hemoglobin decreased to 7 6, this for the element of hemodilution as well  Will monitor hemoglobin q 6 hours        Acute kidney injury [facility])   Assessment & Plan    Patient has urinary tension twice so far had to be straight cathed  Creatinine decreased 2 5 6 today  Will continue normal saline solution 60 an hour and will monitor renal function  Ultrasound of the kidneys will be done as well        Type 2 diabetes mellitus with stage 3 chronic kidney disease, without long-term current use of insulin [facility])   Assessment & Plan    No results found for: HGBA1C    Recent Labs      07/01/18   1153  07/01/18   1611  07/01/18   2101  07/02/18   0609   POCGLU  320*  374*  194*  146*       Blood Sugar Average: Last 72 hrs:  (P) 258 5     Fasting blood sugar is 146    Baseline creatinine 1 5         Chronic respiratory failure with hypoxia ([facility])   Assessment & Plan    Continue oxygen via nasal cannula        COPD (chronic obstructive pulmonary disease) ([facility])   Assessment & Plan    I heard no wheezing of the physical examination will continue Pulmicort and albuterol        CHF (congestive heart failure) ([facility])   Assessment & Plan    I did not see evidence of peripheral edema, will get echo to evaluate LV function, diuretics is on hold        Benign prostatic hyperplasia with urinary retention   Assessment & Plan    If patient has urinary tension again will insert Moralez catheter will start patient on Flomax            VTE Prophylaxis: in place    Patient Centered Rounds: I rounded with patient's nurse    Current Length of Stay: 1 day(s)    Current Patient Status: Inpatient    Certification Statement: Pt requires additional inpatient hospital stay due to: see assessment and plan        Subjective:   Patient's nurse reports that patient had no overt bleeding over night, had to be straight cathed  twice for urinary tension, is on 3 liters/minute nasal cannula  Patient denies chest pain, palpitations, shortness of breath, nausea, abdominal pain, arm pain or back pain  Patient's home reports that patient had urinary tension issues before    All other ROS are negative    Objective:     Vitals:   Temp (24hrs), Av 5 °F (36 9 °C), Min:96 9 °F (36 1 °C), Max:99 6 °F (37 6 °C)    HR:  [65-85] 66  Resp:  [17-29] 21  BP: ()/(48-74) 145/63  SpO2:  [86 %-100 %] 96 %  Body mass index is 30 81 kg/m²  Input and Output Summary (last 24 hours): Intake/Output Summary (Last 24 hours) at 18 0734  Last data filed at 18 0440   Gross per 24 hour   Intake          2702 37 ml   Output              823 ml   Net          1879 37 ml       Physical Exam:     Physical Exam   Constitutional: He appears well-developed  No distress  HENT:   Head: Normocephalic  Mouth/Throat: Oropharynx is clear and moist    Eyes: Conjunctivae are normal    Neck: Neck supple  Cardiovascular: Normal rate and regular rhythm  Pulmonary/Chest: Effort normal  No respiratory distress  He has no wheezes  He has no rales  Abdominal: Soft  Bowel sounds are normal  He exhibits distension (Abdomen is slightly distended patient claims this is chronic)  There is no tenderness  Musculoskeletal: He exhibits no tenderness  Lymphadenopathy:     He has no cervical adenopathy  Neurological: He is alert  No cranial nerve deficit  Skin: Skin is warm and dry  No rash noted     Subcutaneous hematoma is seen on the right posterior chest due to fall Psychiatric: He has a normal mood and affect  Vitals reviewed  I personally reviewed labs and imaging reports for today  Last 24 Hours Medication List:     Current Facility-Administered Medications:  acetaminophen 650 mg Oral Q6H PRN Kristi Anderson MD    albuterol 2 5 mg Nebulization Q6H PRN Kristi Anderson MD    budesonide 0 5 mg Nebulization BID Kristi Anderson MD    fluticasone-salmeterol 1 puff Inhalation Q12H Kristi Anderson MD    HYDROcodone-acetaminophen 1 tablet Oral Q6H PRN Kristi Anderson MD    insulin lispro 1-5 Units Subcutaneous HS ROMAINE Dillon    insulin lispro 1-6 Units Subcutaneous TID AC Kristi Anderson MD    latanoprost 1 drop Both Eyes HS Kristi Anderson MD    menthol-methyl salicylate  Apply externally 4x Daily PRN ROMAINE Weaver    metoprolol succinate 25 mg Oral Daily Kristi Anderson MD    ondansetron 4 mg Intravenous Q6H PRN Kristi Anderson MD    pantoprazole 40 mg Intravenous Q12H Mariela Brunson MD    pravastatin 40 mg Oral Daily With Tere Sage MD    sodium chloride 60 mL/hr Intravenous Continuous Antoine Mcdonald MD Last Rate: 60 mL/hr (07/02/18 0034)          Today, Patient Was Seen By: Jamila Baltazar MD    ** Please Note: Dictation voice to text software may have been used in the creation of this document   **

## 2018-07-02 NOTE — ASSESSMENT & PLAN NOTE
I suspect patient has an upper GI bleed  Will continue IV Protonix and patient is scheduled for EGD today      Case was discussed with patient's daughter and son at the bedside in detail

## 2018-07-02 NOTE — PLAN OF CARE
Problem: PHYSICAL THERAPY ADULT  Goal: Performs mobility at highest level of function for planned discharge setting  See evaluation for individualized goals  Treatment/Interventions: Functional transfer training, LE strengthening/ROM, Therapeutic exercise, Endurance training, Bed mobility, Gait training, Spoke to nursing, OT  Equipment Recommended: Doc Regalado, PT         See flowsheet documentation for full assessment, interventions and recommendations  Prognosis: Fair  Problem List: Decreased strength, Decreased endurance, Impaired balance, Decreased mobility, Pain  Assessment: Fabienne is a 81y/o M who presented on 7/1 after a fall out of his w/c  He was found to have renal failure, anemia and heme positive stool  His hemoglobin is trending down and he had an EGD today  His PMH is significant for DM, CHF, HTN, COPD, HLD, CABG  His medical instabilities include multiple lines, telemetry, vital sign monitoring, pain, fatigue, overall decreased strength, balance, endurance and mobility  He tolerated sitting edge of bed  Excessive amount of time to complete bed mobility  Very fatigued  Not appropriate for transfers at this time  Anticipate rehab at discharge  Recommendation: Post acute IP rehab          See flowsheet documentation for full assessment

## 2018-07-02 NOTE — OP NOTE
**** GI/ENDOSCOPY REPORT ****     PATIENT NAME: CAMMY IBARRA - VISIT ID:  Patient ID: [id number] YOB: 1927     INTRODUCTION: Esophagogastroduodenoscopy - A 80 male patient presents for   an inpatient Esophagogastroduodenoscopy at [address]  INDICATIONS: Recent melena  Unspecified type of anemia  CONSENT: The benefits, risks, and alternatives to the procedure were   discussed and informed consent was obtained from the patient  PREPARATION:  EKG, pulse, pulse oximetry and blood pressure were monitored   throughout the procedure  MEDICATIONS:     PROCEDURE:  The endoscope was passed without difficulty through the mouth   under direct visualization and advanced to the 2nd portion of the   duodenum  The scope was withdrawn and the mucosa was carefully examined  FINDINGS:   Duodenum: The duodenum appeared to be normal   Stomach: Moderately severe erosive gastritis was found in the antrum and body of   the stomach  A biopsy was taken  The specimen was collected for pathology   and wilver test (jar 2 and jar 2)  Non-erosive gastritis was found in the   fundus  There was a small hiatus hernia visible in the stomach  Esophagus: The esophagus appeared to be normal      COMPLICATIONS: There were no complications  IMPRESSIONS: Normal duodenum  Moderately severe erosive gastritis found in   the antrum and body of the stomach  Biopsy taken  Non-erosive gastritis   found in the fundus  A hiatus hernia found  Normal esophagus  RECOMMENDATIONS: Continue taking the following medications as prescribed:   Protonix  Resume regular diet as tolerated  Follow-up on the results of   the biopsy specimens in 1-2 week  ESTIMATED BLOOD LOSS:     PATHOLOGY SPECIMENS: Biopsy taken for pathology and wilver test (jar 2 and   jar 2)  Associated finding: Gastritis       PROCEDURE CODES: 31530 - EGD flexible; with biopsy     ICD-9 Codes: 578 1 Blood in stool 285 9 Anemia, unspecified 535 40 Other   specified gastritides, without mention of hemorrhage 553 3 Diaphragmatic   hernia without mention of obstruction or gangrene     ICD-10 Codes: K92 1 Melena D64 9 Anemia, unspecified K29 Gastritis and   duodenitis K29 Gastritis and duodenitis K44 Diaphragmatic hernia     PERFORMED BY: POLA Magaña  on     Version 1, electronically signed by POLA Pollack  on   07/02/2018 at 10:36

## 2018-07-02 NOTE — PROGRESS NOTES
Re-bladder scanned  Pt unable to void at this time  Bladder distended  Bladder scan 301, straight cath sterile procedure observed- for 325ml  Pt denies pain, but then moaning and groaning  When asked if he wanted a pain pill he said "yes " PRN Norco administered  C/o pain in left arm "achey, it's muscular"   Safety measures enforced  Reviewed use of call bell with patient  He turns side to side on his own  AVSS  At times A paced, V paced with BBB  No distress  Remains on nasal cannula 3 L  Needs met

## 2018-07-02 NOTE — ASSESSMENT & PLAN NOTE
Patient has urinary tension twice so far had to be straight cathed  Creatinine decreased 2 5 6 today  Will continue normal saline solution 60 an hour and will monitor renal function    Ultrasound of the kidneys will be done as well

## 2018-07-02 NOTE — ASSESSMENT & PLAN NOTE
I did not see evidence of peripheral edema, will get echo to evaluate LV function, diuretics is on hold

## 2018-07-02 NOTE — PLAN OF CARE
CARDIOVASCULAR - ADULT     Absence of cardiac dysrhythmias or at baseline rhythm Progressing        DISCHARGE PLANNING     Discharge to home or other facility with appropriate resources Progressing        HEMATOLOGIC - ADULT     Maintains hematologic stability Progressing        INFECTION - ADULT     Absence or prevention of progression during hospitalization Progressing        Knowledge Deficit     Patient/family/caregiver demonstrates understanding of disease process, treatment plan, medications, and discharge instructions Progressing        METABOLIC, FLUID AND ELECTROLYTES - ADULT     Electrolytes maintained within normal limits Progressing     Fluid balance maintained Progressing     Glucose maintained within target range Progressing        PAIN - ADULT     Verbalizes/displays adequate comfort level or baseline comfort level Progressing        Potential for Falls     Patient will remain free of falls Progressing        Prexisting or High Potential for Compromised Skin Integrity     Skin integrity is maintained or improved Progressing        RESPIRATORY - ADULT     Achieves optimal ventilation and oxygenation Progressing        SKIN/TISSUE INTEGRITY - ADULT     Incision(s), wounds(s) or drain site(s) healing without S/S of infection Progressing

## 2018-07-03 ENCOUNTER — APPOINTMENT (INPATIENT)
Dept: RADIOLOGY | Facility: HOSPITAL | Age: 83
DRG: 377 | End: 2018-07-03
Payer: COMMERCIAL

## 2018-07-03 PROBLEM — M25.512 ACUTE PAIN OF LEFT SHOULDER: Status: ACTIVE | Noted: 2018-07-03

## 2018-07-03 PROBLEM — K29.00 ACUTE GASTRITIS WITHOUT HEMORRHAGE: Status: ACTIVE | Noted: 2018-07-03

## 2018-07-03 LAB
ANION GAP SERPL CALCULATED.3IONS-SCNC: 5 MMOL/L (ref 4–13)
ATRIAL RATE: 65 BPM
BASOPHILS # BLD AUTO: 0.02 THOUSANDS/ΜL (ref 0–0.1)
BASOPHILS NFR BLD AUTO: 0 % (ref 0–1)
BUN SERPL-MCNC: 93 MG/DL (ref 5–25)
CALCIUM SERPL-MCNC: 7.9 MG/DL (ref 8.3–10.1)
CHLORIDE SERPL-SCNC: 104 MMOL/L (ref 100–108)
CO2 SERPL-SCNC: 33 MMOL/L (ref 21–32)
CREAT SERPL-MCNC: 2.54 MG/DL (ref 0.6–1.3)
EOSINOPHIL # BLD AUTO: 0.16 THOUSAND/ΜL (ref 0–0.61)
EOSINOPHIL NFR BLD AUTO: 1 % (ref 0–6)
ERYTHROCYTE [DISTWIDTH] IN BLOOD BY AUTOMATED COUNT: 15.2 % (ref 11.6–15.1)
GFR SERPL CREATININE-BSD FRML MDRD: 21 ML/MIN/1.73SQ M
GLUCOSE SERPL-MCNC: 137 MG/DL (ref 65–140)
GLUCOSE SERPL-MCNC: 159 MG/DL (ref 65–140)
GLUCOSE SERPL-MCNC: 164 MG/DL (ref 65–140)
GLUCOSE SERPL-MCNC: 177 MG/DL (ref 65–140)
GLUCOSE SERPL-MCNC: 205 MG/DL (ref 65–140)
HCT VFR BLD AUTO: 26.3 % (ref 36.5–49.3)
HGB BLD-MCNC: 8 G/DL (ref 12–17)
IMM GRANULOCYTES # BLD AUTO: 0.08 THOUSAND/UL (ref 0–0.2)
IMM GRANULOCYTES NFR BLD AUTO: 1 % (ref 0–2)
LYMPHOCYTES # BLD AUTO: 0.64 THOUSANDS/ΜL (ref 0.6–4.47)
LYMPHOCYTES NFR BLD AUTO: 6 % (ref 14–44)
MCH RBC QN AUTO: 28.1 PG (ref 26.8–34.3)
MCHC RBC AUTO-ENTMCNC: 30.4 G/DL (ref 31.4–37.4)
MCV RBC AUTO: 92 FL (ref 82–98)
MONOCYTES # BLD AUTO: 0.99 THOUSAND/ΜL (ref 0.17–1.22)
MONOCYTES NFR BLD AUTO: 9 % (ref 4–12)
NEUTROPHILS # BLD AUTO: 9.17 THOUSANDS/ΜL (ref 1.85–7.62)
NEUTS SEG NFR BLD AUTO: 83 % (ref 43–75)
NRBC BLD AUTO-RTO: 0 /100 WBCS
P AXIS: 76 DEGREES
PLATELET # BLD AUTO: 206 THOUSANDS/UL (ref 149–390)
PMV BLD AUTO: 9.9 FL (ref 8.9–12.7)
POTASSIUM SERPL-SCNC: 3.8 MMOL/L (ref 3.5–5.3)
PR INTERVAL: 226 MS
QRS AXIS: 269 DEGREES
QRSD INTERVAL: 160 MS
QT INTERVAL: 494 MS
QTC INTERVAL: 513 MS
RBC # BLD AUTO: 2.85 MILLION/UL (ref 3.88–5.62)
SODIUM SERPL-SCNC: 142 MMOL/L (ref 136–145)
T WAVE AXIS: 108 DEGREES
TROPONIN I SERPL-MCNC: 0.08 NG/ML
TROPONIN I SERPL-MCNC: 0.09 NG/ML
TROPONIN I SERPL-MCNC: 0.1 NG/ML
UREASE TISS QL: NEGATIVE
VENTRICULAR RATE: 65 BPM
WBC # BLD AUTO: 11.06 THOUSAND/UL (ref 4.31–10.16)

## 2018-07-03 PROCEDURE — 97530 THERAPEUTIC ACTIVITIES: CPT

## 2018-07-03 PROCEDURE — 82948 REAGENT STRIP/BLOOD GLUCOSE: CPT

## 2018-07-03 PROCEDURE — 94760 N-INVAS EAR/PLS OXIMETRY 1: CPT

## 2018-07-03 PROCEDURE — 74019 RADEX ABDOMEN 2 VIEWS: CPT

## 2018-07-03 PROCEDURE — 84484 ASSAY OF TROPONIN QUANT: CPT | Performed by: PHYSICIAN ASSISTANT

## 2018-07-03 PROCEDURE — 92610 EVALUATE SWALLOWING FUNCTION: CPT

## 2018-07-03 PROCEDURE — 99223 1ST HOSP IP/OBS HIGH 75: CPT | Performed by: INTERNAL MEDICINE

## 2018-07-03 PROCEDURE — 99222 1ST HOSP IP/OBS MODERATE 55: CPT | Performed by: PHYSICIAN ASSISTANT

## 2018-07-03 PROCEDURE — 93010 ELECTROCARDIOGRAM REPORT: CPT | Performed by: INTERNAL MEDICINE

## 2018-07-03 PROCEDURE — 73060 X-RAY EXAM OF HUMERUS: CPT

## 2018-07-03 PROCEDURE — 71045 X-RAY EXAM CHEST 1 VIEW: CPT

## 2018-07-03 PROCEDURE — 93005 ELECTROCARDIOGRAM TRACING: CPT

## 2018-07-03 PROCEDURE — 94640 AIRWAY INHALATION TREATMENT: CPT

## 2018-07-03 PROCEDURE — 99233 SBSQ HOSP IP/OBS HIGH 50: CPT | Performed by: INTERNAL MEDICINE

## 2018-07-03 PROCEDURE — 99232 SBSQ HOSP IP/OBS MODERATE 35: CPT | Performed by: NURSE PRACTITIONER

## 2018-07-03 PROCEDURE — 80048 BASIC METABOLIC PNL TOTAL CA: CPT | Performed by: INTERNAL MEDICINE

## 2018-07-03 PROCEDURE — 85025 COMPLETE CBC W/AUTO DIFF WBC: CPT | Performed by: INTERNAL MEDICINE

## 2018-07-03 RX ORDER — IPRATROPIUM BROMIDE AND ALBUTEROL SULFATE 2.5; .5 MG/3ML; MG/3ML
3 SOLUTION RESPIRATORY (INHALATION)
Status: DISCONTINUED | OUTPATIENT
Start: 2018-07-03 | End: 2018-07-13 | Stop reason: HOSPADM

## 2018-07-03 RX ORDER — FUROSEMIDE 40 MG/1
40 TABLET ORAL DAILY
Status: DISCONTINUED | OUTPATIENT
Start: 2018-07-03 | End: 2018-07-03

## 2018-07-03 RX ORDER — GLIMEPIRIDE 2 MG/1
1 TABLET ORAL
Status: DISCONTINUED | OUTPATIENT
Start: 2018-07-03 | End: 2018-07-04

## 2018-07-03 RX ORDER — FUROSEMIDE 40 MG/1
40 TABLET ORAL
Status: DISCONTINUED | OUTPATIENT
Start: 2018-07-03 | End: 2018-07-04

## 2018-07-03 RX ORDER — TRAMADOL HYDROCHLORIDE 50 MG/1
50 TABLET ORAL EVERY 6 HOURS PRN
Status: DISCONTINUED | OUTPATIENT
Start: 2018-07-03 | End: 2018-07-13 | Stop reason: HOSPADM

## 2018-07-03 RX ORDER — PREDNISONE 10 MG/1
10 TABLET ORAL
Status: DISCONTINUED | OUTPATIENT
Start: 2018-07-03 | End: 2018-07-05

## 2018-07-03 RX ADMIN — METRONIDAZOLE 500 MG: 500 INJECTION, SOLUTION INTRAVENOUS at 13:01

## 2018-07-03 RX ADMIN — HYDROCODONE BITARTRATE AND ACETAMINOPHEN 1 TABLET: 5; 325 TABLET ORAL at 11:20

## 2018-07-03 RX ADMIN — LATANOPROST 1 DROP: 50 SOLUTION OPHTHALMIC at 22:06

## 2018-07-03 RX ADMIN — PRAVASTATIN SODIUM 40 MG: 40 TABLET ORAL at 17:22

## 2018-07-03 RX ADMIN — BUDESONIDE 0.5 MG: 0.5 INHALANT RESPIRATORY (INHALATION) at 21:07

## 2018-07-03 RX ADMIN — BUDESONIDE 0.5 MG: 0.5 INHALANT RESPIRATORY (INHALATION) at 09:25

## 2018-07-03 RX ADMIN — FUROSEMIDE 40 MG: 40 TABLET ORAL at 08:37

## 2018-07-03 RX ADMIN — INSULIN LISPRO 1 UNITS: 100 INJECTION, SOLUTION INTRAVENOUS; SUBCUTANEOUS at 06:20

## 2018-07-03 RX ADMIN — INSULIN LISPRO 1 UNITS: 100 INJECTION, SOLUTION INTRAVENOUS; SUBCUTANEOUS at 22:06

## 2018-07-03 RX ADMIN — GLIMEPIRIDE 1 MG: 2 TABLET ORAL at 08:32

## 2018-07-03 RX ADMIN — HYDROCODONE BITARTRATE AND ACETAMINOPHEN 1 TABLET: 5; 325 TABLET ORAL at 04:09

## 2018-07-03 RX ADMIN — CEFTRIAXONE 1000 MG: 1 INJECTION, SOLUTION INTRAVENOUS at 11:56

## 2018-07-03 RX ADMIN — PREDNISONE 10 MG: 10 TABLET ORAL at 17:23

## 2018-07-03 RX ADMIN — INSULIN LISPRO 1 UNITS: 100 INJECTION, SOLUTION INTRAVENOUS; SUBCUTANEOUS at 17:28

## 2018-07-03 RX ADMIN — IPRATROPIUM BROMIDE AND ALBUTEROL SULFATE 3 ML: 2.5; .5 SOLUTION RESPIRATORY (INHALATION) at 16:46

## 2018-07-03 RX ADMIN — TRAMADOL HYDROCHLORIDE 50 MG: 50 TABLET, FILM COATED ORAL at 17:35

## 2018-07-03 RX ADMIN — TRAMADOL HYDROCHLORIDE 50 MG: 50 TABLET, FILM COATED ORAL at 08:35

## 2018-07-03 RX ADMIN — METOPROLOL SUCCINATE 25 MG: 25 TABLET, EXTENDED RELEASE ORAL at 08:36

## 2018-07-03 RX ADMIN — SITAGLIPTIN 25 MG: 25 TABLET, FILM COATED ORAL at 08:41

## 2018-07-03 RX ADMIN — METRONIDAZOLE 500 MG: 500 INJECTION, SOLUTION INTRAVENOUS at 20:41

## 2018-07-03 RX ADMIN — FLUTICASONE PROPIONATE AND SALMETEROL 1 PUFF: 50; 250 POWDER RESPIRATORY (INHALATION) at 09:25

## 2018-07-03 RX ADMIN — PANTOPRAZOLE SODIUM 40 MG: 40 TABLET, DELAYED RELEASE ORAL at 06:20

## 2018-07-03 RX ADMIN — IPRATROPIUM BROMIDE AND ALBUTEROL SULFATE 3 ML: 2.5; .5 SOLUTION RESPIRATORY (INHALATION) at 21:07

## 2018-07-03 RX ADMIN — FUROSEMIDE 40 MG: 40 TABLET ORAL at 17:21

## 2018-07-03 NOTE — PHYSICAL THERAPY NOTE
Physical Therapy Progress Note     07/03/18 1335   Pain Assessment   Pain Assessment FLACC   Pain Rating: FLACC (Rest) - Face 2   Pain Rating: FLACC (Rest) - Legs 0   Pain Rating: FLACC (Rest) - Activity 0   Pain Rating: FLACC (Rest) - Cry 1   Pain Rating: FLACC (Rest) - Consolability 1   Score: FLACC (Rest) 4   Pain Rating: FLACC (Activity) - Face 2   Pain Rating: FLACC (Activity) - Legs 1   Pain Rating: FLACC (Activity) - Activity 1   Pain Rating: FLACC (Activity) - Cry 1   Pain Rating: FLACC (Activity) - Consolability 1   Score: FLACC (Activity) 6   Restrictions/Precautions   Weight Bearing Precautions Per Order No   Other Precautions Bed Alarm;Cognitive;Multiple lines;Telemetry;O2;Fall Risk;Pain   General   Chart Reviewed Yes   Additional Pertinent History Pt with c/o L arm pain - xrays neg for fracture    Response to Previous Treatment Patient with no complaints from previous session  Family/Caregiver Present Yes  (son present )   Cognition   Overall Cognitive Status Impaired   Arousal/Participation Arousable   Attention Attends with cues to redirect   Orientation Level Oriented to person   Memory Decreased recall of recent events;Decreased short term memory   Following Commands Follows one step commands inconsistently   Comments increased lethargy   Subjective   Subjective Agreeable to get OOB to chair   Bed Mobility   Supine to Sit 2  Maximal assistance   Additional items Assist x 1;Verbal cues; Increased time required   Additional Comments positioned OOB in bedside chair at end of session, all needs in reach, son at side, RN present   Transfers   Sit to Stand 2  Maximal assistance   Additional items Increased time required;Verbal cues   Stand to Sit 2  Maximal assistance   Additional items Verbal cues; Assist x 1;Bedrails   Stand pivot 2  Maximal assistance   Additional items Assist x 1; Increased time required;Verbal cues   Ambulation/Elevation   Gait pattern Not appropriate   Balance   Static Sitting Poor - Dynamic Sitting Poor -   Static Standing Poor -   Dynamic Standing Poor -   Endurance Deficit   Endurance Deficit Yes   Endurance Deficit Description pain, generalized deconditioning   Activity Tolerance   Activity Tolerance Patient limited by fatigue;Patient limited by pain   Nurse Made Aware RN consented to therapy session    Assessment   Prognosis Fair   Problem List Decreased strength; Impaired balance;Decreased range of motion;Decreased endurance;Decreased safety awareness;Decreased mobility; Decreased cognition; Impaired judgement   Assessment Pt continues with increased lethargy however able to progress with mobility to get OOB to bedside chair  Required max A for seated EOB balance as well as mobility to chair however slight imoprovement in alertness noted oce OOB to chair  Will continue to assess/progress as able  Goals   Patient Goals None stated   STG Expiration Date 07/16/18   Treatment Day 1   Plan   Treatment/Interventions Functional transfer training;LE strengthening/ROM; Therapeutic exercise; Endurance training;Cognitive reorientation;Patient/family training;Equipment eval/education; Bed mobility;Gait training;Continued evaluation;Spoke to nursing;Spoke to case management   Progress Slow progress, decreased activity tolerance   PT Frequency Other (Comment)  (3-5x)   Recommendation   Recommendation Post acute IP rehab   Equipment Recommended Leah Gonzales PT

## 2018-07-03 NOTE — PROGRESS NOTES
Progress Note - Andrésncomid [place] 3/15/1927, 80 y o  male MRN: [id number]    Unit/Bed#: -01 Encounter: [id number]    Primary Care Provider: Lisa Slater MD   Date and time admitted to hospital: 7/1/2018  7:23 AM        Acute gastritis without hemorrhage   Assessment & Plan    Switched from IV to p o  Protonix this morning        ATN (acute tubular necrosis) (HCC)   Assessment & Plan    Nephrology following-baseline creatinine is 1  5        Acute blood loss anemia   Assessment & Plan    Patient's hemoglobin slightly improved-will decrease to q 12 hours H&H         Acute kidney injury [facility])   Assessment & Plan    Patient has Moralez in place for urinary retention-creatinine still elevated at 2 54-on gentle IV fluid hydration at 60 mL/hour given his history of CHF  Nephrology input appreciated with Dr Sara Ortiz    EGD showed gastritis with no active bleeding noted currently    Hemoglobin up slightly this morning at 8 0-will continue to follow serial labs        Benign prostatic hyperplasia with urinary retention   Assessment & Plan    Now on Flomax--continue Moralez for close monitoring of urine output for another 24 hr        Cancer [facility])   Assessment & Plan    Remote history of colon cancer with colonoscopy approximately 5 years ago according to his daughter who was at the bedside        Hyperlipidemia   Assessment & Plan    Continue on statin        COPD (chronic obstructive pulmonary disease) ([facility] [place] [address] )   Assessment & Plan    No signs of acute exacerbation or wheezing today- will continue Pulmicort and albuterol        Hypertension   Assessment & Plan    Well controlled-continue on beta-blockers metoprolol 25 mg daily        Chronic respiratory failure with hypoxia ([facility])   Assessment & Plan    Continue oxygen via nasal cannula-sats dropped slightly this morning and patient does appear to have some basilar crackles-will check chest x-ray and restart Lasix at 40 mg daily orally today-had been on Lasix 40 b i d  as an outpatient but that was held on admission because of the acute kidney injury        Type 2 diabetes mellitus with stage 3 chronic kidney disease, without long-term current use of insulin [facility])   Assessment & Plan    No results found for: HGBA1C    Recent Labs      18   1115  18   1622  18   2119  18   0552   POCGLU  178*  229*  308*  177*       Blood Sugar Average: Last 72 hrs:  (P) 233 [id number]     Fasting blood sugar is 146  Baseline creatinine 1 5   Patient on coverage-was on low-dose Amaryl as an outpatient-restart at 1 mg daily- will start also on low dose of Januvia-if not showing improving control will consider for nighttime Lantus        Acute pain of left shoulder   Assessment & Plan    Will of x-ray done in the areas he recently has had some falls and appears quite uncomfortable-does have hydrocodone ordered  Will consult Ortho this seems to be more in the biceps region            VTE Prophylaxis: in place    Patient Centered Rounds: I rounded with patient's nurse    Current Length of Stay: 2 day(s)    Current Patient Status: Inpatient    Certification Statement: Pt requires additional inpatient hospital stay due to: see assessment and plan        Subjective:   Patient uncomfortable in bed due to his left arm pain  Had troponins done overnight which were lower than prior records  EKG read appeared stable-he has paced rhythm on the monitor currently-as well as some intermittent intrinsic beats  No nausea or vomiting  Denies abdominal or chest pain today    All other ROS are negative    Objective:     Vitals:   Temp (24hrs), Av 3 °F (36 8 °C), Min:97 3 °F (36 3 °C), Max:99 2 °F (37 3 °C)    HR:  [63-73] 66  Resp:  [16-23] 22  BP: ()/(47-83) 116/55  SpO2:  [93 %-98 %] 96 %  Body mass index is 31 6 kg/m²  Input and Output Summary (last 24 hours):        Intake/Output Summary (Last 24 hours) at 18 2462  Last data filed at 07/03/18 0601   Gross per 24 hour   Intake             1683 ml   Output             1097 ml   Net              586 ml       Physical Exam:     Physical Exam   Constitutional: He appears well-developed and well-nourished  He appears distressed  Uncomfortable with moving in bed and complains of left upper arm pain   HENT:   Head: Normocephalic and atraumatic  Mouth/Throat: No oropharyngeal exudate  Eyes: Right eye exhibits no discharge  Left eye exhibits no discharge  Neck: No JVD present  No tracheal deviation present  No thyromegaly present  Cardiovascular: Normal rate  Currently paced at 68 beats per minute  Pulmonary/Chest: Effort normal  No respiratory distress  He has rales  Crackles in bases   Abdominal: Soft  Bowel sounds are normal  He exhibits no distension  There is no tenderness  Musculoskeletal: He exhibits no edema  Tenderness over the left biceps region and lateral triceps  Pain with upward reach a in the subdeltoid region   Neurological: He is alert  No cranial nerve deficit  Coordination normal    Answers questions appropriately   Skin: Skin is warm and dry  No rash noted  No erythema  Psychiatric: He has a normal mood and affect  His behavior is normal    Nursing note and vitals reviewed  I personally reviewed labs and imaging reports for today        Last 24 Hours Medication List:     Current Facility-Administered Medications:  acetaminophen 650 mg Oral Q6H PRN Korina Bustillos MD    albuterol 2 5 mg Nebulization Q6H PRN Korina Bustillos MD    budesonide 0 5 mg Nebulization BID Korina Bustillos MD    fluticasone-salmeterol 1 puff Inhalation Q12H Korina Bustillos MD    furosemide 40 mg Oral Daily Mary Kate Fly, DO    HYDROcodone-acetaminophen 1 tablet Oral Q6H PRN Korina Bustillos MD    insulin lispro 1-5 Units Subcutaneous HS ROMAINE Dillon    insulin lispro 1-6 Units Subcutaneous TID AC Korina Bustillos MD    latanoprost 1 drop Both Eyes HS Yong Guzman Jeffrey MD    menthol-methyl salicylate  Apply externally 4x Daily PRN ROMAINE Renner    metoprolol succinate 25 mg Oral Daily ROMAINE Manuel    ondansetron 4 mg Intravenous Q6H PRN Caitie Tinoco MD    ondansetron 4 mg Intravenous Once PRN Shila Spear DO    pantoprazole 40 mg Oral Early Morning Lori Neal MD    pravastatin 40 mg Oral Daily With Ck Ann MD    sodium chloride 60 mL/hr Intravenous Continuous Callie Oconnell MD Last Rate: 60 mL/hr (07/02/18 1908)          Today, Patient Was Seen By: Shankar Nugent DO    ** Please Note: Dictation voice to text software may have been used in the creation of this document   **

## 2018-07-03 NOTE — PROGRESS NOTES
Spoke to Deshawn Pool and Dr Bridger Harrell about anticoagulation for patient  Also, updated DR Joseph about Arm pain and overnight status  New orders placed  Report given and Terri Henao and Terri Strauss  Questions answered and care transferred

## 2018-07-03 NOTE — ASSESSMENT & PLAN NOTE
No results found for: HGBA1C    Recent Labs      07/02/18   1115  07/02/18   1622  07/02/18   2119  07/03/18   0552   POCGLU  178*  229*  308*  177*       Blood Sugar Average: Last 72 hrs:  (P) 233 [id number]     Fasting blood sugar is 146    Baseline creatinine 1 5   Patient on coverage-was on low-dose Amaryl as an outpatient-restart at 1 mg daily- will start also on low dose of Januvia-if not showing improving control will consider for nighttime Lantus

## 2018-07-03 NOTE — PROGRESS NOTES
Called to room by RN due to patient experiencing left arm pain  Patient was gasping in pain upon presentation  He states pain started earlier today and radiates down the arm  He denies chest pain, palpitations, SOB, trouble breathing, diaphoresis, N/V/D, dizziness  Norco 5 mg was administered for pain  Due to patients extensive cardiac history will order Troponin and EKG

## 2018-07-03 NOTE — ASSESSMENT & PLAN NOTE
EGD showed gastritis with no active bleeding noted currently    Hemoglobin up slightly this morning at 8 0-will continue to follow serial labs

## 2018-07-03 NOTE — PROGRESS NOTES
Joseline Shaw  [id number]    5 y o   male      ASSESSMENT  1  Symptomatic anemia with melena  An upper endoscopy performed 7/2 showed erosive gastritis and a small hiatal hernia  No upper GI bleed source identified  Hemoglobin 8 0 today and no overt GI blood loss noted  2   Acute anemia probably acute blood loss anemia  3  GERD on Pepcid  4  Distant history of iron deficiency anemia and occult GI bleeding  S/P EGD/colonoscopy/capsule endoscopy 2007  Treated with a year course of oral iron  5  Distant history of peptic ulcer disease  6  Distant history of colon cancer  7  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  with congestive heart failure followed by Dr Alexus Page  8  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  9  H/O AAA  10   DM    PLAN  1  Follow CBC and CMP  2  Continue Protonix 40 mg daily  3  Check obstruction series is abdomen seems distended to exclude ileus    Chief Complaint   Patient presents with    Fall     Patient sent in from [place] [id number] after falling out of his wheelchair today  Staff states this is the 3rd time he has done this in 2 days  Patietn initially complaisned of back pain but none upon arrival       SUBJECTIVE/HPI    Abdominal distention  No GI complaints  No melena,  rectal bleeding, nausea or vomiting      /55 (BP Location: Right arm)   Pulse 68   Temp 98 3 °F (36 8 °C) (Oral)   Resp 20   Ht 5' 6" (1 676 m)   Wt 88 8 kg (195 lb 12 3 oz)   SpO2 97%   BMI 31 60 kg/m²       PHYSICALEXAM  Constitutional:   Elderly, non-toxic appearance   Eyes:   conjunctiva  pale   HENT:  Atraumatic  Respiratory:   Few bibasilar rales  Cardiovascular:  Normal rate, normal rhythm, no murmurs, no gallops, no rubs   GI:  Soft, distended,  decreasedbowel sounds,   Diffuse tenderness throughout  Neurologic:  Alert & oriented x 2      Lab Results   Component Value Date    GLUCOSE 159 (H) 07/03/2018    CALCIUM 7 9 (L) 07/03/2018     07/03/2018    K 3 8 07/03/2018    CO2 33 (H) 07/03/2018     07/03/2018    BUN 93 (H) 07/03/2018    CREATININE 2 54 (H) 07/03/2018     Lab Results   Component Value Date    WBC 11 06 (H) 07/03/2018    HGB 8 0 (L) 07/03/2018    HCT 26 3 (L) 07/03/2018    MCV 92 07/03/2018     07/03/2018     Lab Results   Component Value Date    ALT 20 07/02/2018    AST 22 07/02/2018    ALKPHOS 53 07/02/2018    BILITOT 0 80 07/02/2018     No results found for: AMYLASE  No results found for: LIPASE  Lab Results   Component Value Date    IRON 18 (L) 07/02/2018    TIBC 423 07/02/2018    FERRITIN 47 07/02/2018     Lab Results   Component Value Date    INR 1 17 07/01/2018       Counseling / Coordination of Care  Total floor / unit time spent today 25 minutes  Greater than 50% of total time was spent with the patient and / or family counseling and / or coordination of care  A description of the counseling / coordination of care: 15    Gaurav Dimas

## 2018-07-03 NOTE — PROGRESS NOTES
Patient was sleeping, but easily arousable  Upon waking up, offered patient pain pill and he said he'd like one  When asking him pain scale- he states he's in "no pain"   Chlorahexidine wipes used, bed bath given  Shortly after that patient gasping, tachpnea, Saying "ow I,o,e,o,u   " and "my arm hurts my arm hurts " Norco administered  No chest pain, no SOB  Unable to quantify and tell me if it's radiating  He does state he has experienced this pain before  PA-Ganesh harrison called to bedside  EKG performed- No change from prior EKG per PA- Tammy Campo  Troponin drawn as well as AM labs  Cam Drilling bob applied to Left shoulder  Requested Morphine from PA- due to unrelief of pain and pt in discomfort  No new orders  Continued assessment  Upon re-assessment pt quiet, denies pain  Appears to be resting  HR 60-70s, V paced  IVF's remain infusing  U/o adequate  Call bell wtihin reach

## 2018-07-03 NOTE — CONSULTS
Consultation - Orthopedics   Sara Saini 80 y o  male MRN: [id number]  Unit/Bed#: -01 Encounter: [id number]      Assessment/Plan     Assessment:  Acute left shoulder   Chronic rotator cuff arthropathy    Plan:  1  Continue current pain analgesics  Patient is already feeling significant improvement compared to yesterday  2  Will benefit from Prednisone PO daily that has been prescribed by pulmonology  3  PT for ROM exercises when medically cleared to do so  4  Follow up as outpatient in 4-6 weeks if symptoms worsen  History of Present Illness   Physician Requesting Consult: Melissa Bartlett MD  Reason for Consult / Principal Problem: left shoulder pain  HPI: Sara Saini is a 80y o  year old male who presents with a sudden onset of left shoulder pain that started after admission one day ago  He did fall prior to admission, but does not recall if he landed on that arm and states he had no left shoulder pain at that time  The pain was severe last night and worse with ROM  He feels less pain today with pain medication  He denies any cervical spine pain or radicular symptoms down his arm  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, and states he may have had a prior injury many years ago  He denies any previous surgery to that shoulder or any other treatment in that past      Inpatient consult to Orthopedic Surgery  Consult performed by: Gayle Tejada  Consult ordered by: Greer Mendoza          Review of Systems   Constitutional: Positive for activity change  HENT: Negative  Eyes: Negative  Respiratory: Positive for shortness of breath  Cardiovascular: Negative  Gastrointestinal: Positive for blood in stool  Endocrine: Negative  Genitourinary: Negative  Musculoskeletal: Positive for arthralgias  Skin: Negative  Allergic/Immunologic: Negative  Neurological: Negative  Hematological: Negative  Psychiatric/Behavioral: Negative          Historical Information Past Medical History:   Diagnosis Date    Aortic regurgitation     Atrial fibrillation (HCC)     Cancer (HCC)     colon    CHF (congestive heart failure) (HCC)     Complete heart block (HCC)     post CABG    COPD (chronic obstructive pulmonary disease) (HCC)     Coronary artery disease     MI    Diabetes mellitus ([facility] [address] )     Dissecting aortic aneurysm (HCC)     Endocarditis     Hx of bleeding disorder     GI bleed on aspirin    Hypertension     Mitral regurgitation     Skin cancer      Past Surgical History:   Procedure Laterality Date    AORTIC VALVE REPLACEMENT      CARDIAC PACEMAKER PLACEMENT      [employer]     CARDIAC SURGERY      CABG on physician notes, none per pt   CHOLECYSTECTOMY      COLECTOMY      ESOPHAGOGASTRODUODENOSCOPY N/A 7/2/2018    Procedure: ESOPHAGOGASTRODUODENOSCOPY (EGD); Surgeon: Giovanni Wayne MD;  Location: [facility];  Service: Gastroenterology    FLAP LOCAL HEAD / NECK N/A 5/19/2016    Procedure: VERSUS FLAP ;  Surgeon: Kaiser Valencia MD;  [facility];  Service:     JOINT REPLACEMENT Left     hip    MITRAL VALVE REPLACEMENT      MOHS RECONSTRUCTION N/A 5/19/2016    Procedure: RECONSTRUCTION MOHS DEFECT NOSE AND SCALP;  Surgeon: Kaiser Valencia MD;  [facility];  Service:    Alvin Santoyo SKIN BIOPSY       Social History   History   Alcohol Use    Yes     Comment: rarely     History   Drug Use No     History   Smoking Status    Former Smoker    Packs/day: 1 00    Years: 20 00    Types: Cigarettes    Quit date: 1960   Smokeless Tobacco    Former User    Quit date: 1970     Family History: non-contributory    Meds/Allergies   all current active meds have been reviewed  Allergies   Allergen Reactions    Plavix [Clopidogrel Bisulfate] GI Bleeding       Objective   Vitals: Blood pressure 122/60, pulse 73, temperature 97 8 °F (36 6 °C), resp  rate 18, height 5' 6" (1 676 m), weight 88 8 kg (195 lb 12 3 oz), SpO2 97 %  ,Body mass index is 31 6 kg/m²  Intake/Output Summary (Last 24 hours) at 07/03/18 1638  Last data filed at 07/03/18 1613   Gross per 24 hour   Intake             2299 ml   Output             1997 ml   Net              302 ml     I/O last 24 hours: In: 1551 [P O :803; I V :1509; IV Piggyback:210]  Out: 2147 [Urine:2147]    Invasive Devices     Peripheral Intravenous Line            Peripheral IV 07/01/18 Right Hand 2 days          Drain            Urethral Catheter Latex;Straight-tip 16 Fr  1 day                Physical Exam   Constitutional: He is oriented to person, place, and time  He appears well-developed  No distress  HENT:   Head: Normocephalic and atraumatic  Eyes: EOM are normal  Pupils are equal, round, and reactive to light  Neck: Normal range of motion  Neck supple  Cardiovascular: Normal rate, regular rhythm and intact distal pulses  Pulmonary/Chest: Effort normal  No respiratory distress  Abdominal: Soft  There is no tenderness  Musculoskeletal: He exhibits no tenderness or deformity  Neurological: He is alert and oriented to person, place, and time  Skin: Skin is warm  Psychiatric: He has a normal mood and affect  Nursing note and vitals reviewed  Right Shoulder Exam   Right shoulder exam is normal       Left Shoulder Exam     Tenderness   The patient is experiencing no tenderness  Range of Motion   Active Abduction: 70   Passive Abduction: 90   Forward Flexion: 90   External Rotation: 90   Internal Rotation 90 degrees: 60     Muscle Strength   Abduction: 4/5   Internal Rotation: 5/5   External Rotation: 4/5   Biceps: 5/5     Tests   Cross Arm: negative  Drop Arm: negative  Impingement: positive    Other   Erythema: absent  Scars: absent  Sensation: normal  Pulse: present     Comments:  Positive painful arc  Pain with empty can              Lab Results: I have personally reviewed pertinent lab results    Imaging Studies: I have personally reviewed pertinent films in PACS  X-rays of the left humerus reveal decreased acromiohumeral space consistent with chronic rotator cuff arthropathy  Code Status: Level 1 - Full Code  Advance Directive and Living Will:      Power of :    POLST:      Counseling / Coordination of Care  Total floor / unit time spent today 30 minutes  Greater than 50% of total time was spent with the patient and / or family counseling and / or coordination of care

## 2018-07-03 NOTE — ASSESSMENT & PLAN NOTE
Remote history of colon cancer with colonoscopy approximately 5 years ago according to his daughter who was at the bedside

## 2018-07-03 NOTE — ASSESSMENT & PLAN NOTE
Continue oxygen via nasal cannula-sats dropped slightly this morning and patient does appear to have some basilar crackles-will check chest x-ray and restart Lasix at 40 mg daily orally today-had been on Lasix 40 b i d  as an outpatient but that was held on admission because of the acute kidney injury

## 2018-07-03 NOTE — ASSESSMENT & PLAN NOTE
Will of x-ray done in the areas he recently has had some falls and appears quite uncomfortable-does have hydrocodone ordered    Will consult Ortho this seems to be more in the biceps region

## 2018-07-03 NOTE — SPEECH THERAPY NOTE
Speech Language/Pathology  Speech/Language Pathology Dysphagia Assessment    Patient Name: Nikki Castro  [id number] Date: 7/3/2018     Problem List  Patient Active Problem List   Diagnosis    Melena    Acute kidney injury ([facility] )    Chronic kidney disease, stage 3    Type 2 diabetes mellitus with stage 3 chronic kidney disease, without long-term current use of insulin ([place] [address] )    CHF (congestive heart failure) ([place] [address] )    Acute blood loss anemia    Chronic respiratory failure with hypoxia ([place] [address] )    Hypertension    COPD (chronic obstructive pulmonary disease) ([place] [address] )    Contusion of flank    Hyperlipidemia    Cancer ([place] [address] )    ATN (acute tubular necrosis) (HCC)    Parenchymal renal hypertension    Benign prostatic hyperplasia with urinary retention    Acute gastritis without hemorrhage    Acute pain of left shoulder     Past Medical History  Past Medical History:   Diagnosis Date    Aortic regurgitation     Atrial fibrillation (HCC)     Cancer (HCC)     colon    CHF (congestive heart failure) (HCC)     Complete heart block (HCC)     post CABG    COPD (chronic obstructive pulmonary disease) (HCC)     Coronary artery disease     MI    Diabetes mellitus ([place] [address] )     Dissecting aortic aneurysm (HCC)     Endocarditis     Hx of bleeding disorder     GI bleed on aspirin    Hypertension     Mitral regurgitation     Skin cancer      Past Surgical History  Past Surgical History:   Procedure Laterality Date    AORTIC VALVE REPLACEMENT      CARDIAC PACEMAKER PLACEMENT      [employer]     CARDIAC SURGERY      CABG on physician notes, none per pt   CHOLECYSTECTOMY      COLECTOMY      ESOPHAGOGASTRODUODENOSCOPY N/A 7/2/2018    Procedure: ESOPHAGOGASTRODUODENOSCOPY (EGD);   Surgeon: Jules Monahan MD;  Location: [facility];  Service: Gastroenterology    FLAP LOCAL HEAD / NECK N/A 5/19/2016    Procedure: VERSUS FLAP ;  Surgeon: Claudene Moon, MD;  Location[facility];  Service:    [employer] JOINT REPLACEMENT Left     hip    MITRAL VALVE REPLACEMENT      MOHS RECONSTRUCTION N/A 5/19/2016    Procedure: RECONSTRUCTION MOHS DEFECT NOSE AND SCALP;  Surgeon: Lynn Lamar MD;  Location: [facility] OR;  Service:    Claus Cleve SKIN BIOPSY       Summary:  Clinically, oropharyngeal swallow appears WFLs  No significant difficulty or overt s/s aspiration observed  Pt will benefit from a slightly softer diet as he is currently lethargic and fatigues easily  Per son and records, pt has had h/o pneumonia since February  As there are no significant s/s aspiration observed at the bedside, if further concern arises regarding oropharyngeal swallow function or risk for microaspiration, would recommend instrumental assessment (VBS) at that time  This can be done as an OP, if needed  Recommendations:  Diet: dental soft/dysphagia level 3  Liquid: thin  Meds: whole with puree (baseline preference)  Supervision: assist as needed  Positioning:Upright  Strategies: Pt to take PO/Meds only when fully alert and upright  Aspiration precautions  Reflux precautions    Eval only, No f/u tx indicated  Consider consult w/:  GI  ENT  Pulmonary  Neurology  Nutrition      HPI:  HPI:  Alirio Jiménez is a 80 y o  male who presented presented to the emergency department from [address] [place] [address] with a fall  As per patient he was going to the bathroom when he lost his balance and fell  He denies any loss of consciousness or head injury  He was sent to ER with right flank contusion but patient denies having any pain at this time  Patient has chronic respiratory failure and uses 2 L of oxygen at the facility  As per family patient has been declining over the last 3-4 weeks  Patient has been complaining of dyspnea on exertion, generalized weakness and tiredness  In ER patient was found to have a hemoglobin of 7 5 and creatinine of 2 81    Patient's last blood work was in February 2018 and he had a hemoglobin of 12 4 and creatinine 1  52   Upon further questioning patient did admit to having black tarry stools for the last few days  Denies having any bright red blood per rectum or hematemesis  Patient denies having any chest pain, back pain, neck pain, headache or any weakness in the extremities  Denies any fever or chills  Denies any abdominal pain, nausea and vomiting  S/P EGD 7/2/18: IMPRESSIONS: Normal duodenum  Moderately severe erosive gastritis found in   the antrum and body of the stomach  Biopsy taken  Non-erosive gastritis   found in the fundus  A hiatus hernia found  Normal esophagus  RECOMMENDATIONS: Continue taking the following medications as prescribed:   Protonix  Resume regular diet as tolerated  Follow-up on the results of   the biopsy specimens in 1-2 week  CXR 7/3:  Cardiomegaly  Mild pulmonary vascular congestive changes  Left greater than right pleural effusions  Relative increased parenchymal density in the left perihilar region and right lower lobe could represent atelectasis or small patchy areas of   pneumonia       Reason for consult:  R/o aspiration  Determine safest and least restrictive diet  Current pneumonia    Current diet:  Regular with thin  Premorbid diet[de-identified]  Regular with thin; family states he chooses softer items and prefers meds in applesauce  Previous VBS:  none here  Voice/Speech:  Quiet but intelligible  Pt is lethargic  Social:  Lives at [facility]; supportive family  Follows commands:  yes                        Cognitive Status:  lethargic  Oral [facility] exam:  Full dentures  Generalized oral motor weakness/fatigue    Items administered:  Puree, carlos crackers, thin liquids  Liquids were taken by straw       Oral stage: WFLs  Lip closure:good  Mastication: effective  Bolus formation: adequate  Bolus control:good  Transfer: timely  Oral residue: mild lingual residue  Pocketing: none    Pharyngeal stage: WFLs  Swallow promptness: prompt/mildly delayed  Laryngeal rise: adequate per palpation  Wet voice: no  Throat clear/min cough: x 1 for carlos cracker with water  Secondary swallows: no  Audible swallows: no    Esophageal stage:  belching  hiatal hernia  Recent EGD    Results d/w:  Pt, nursing, family, physician

## 2018-07-03 NOTE — ASSESSMENT & PLAN NOTE
Patient has Moralez in place for urinary retention-creatinine still elevated at 2 54-on gentle IV fluid hydration at 60 mL/hour given his history of CHF    Nephrology input appreciated with Dr Deion Little

## 2018-07-03 NOTE — PROGRESS NOTES
NEPHROLOGY PROGRESS NOTE   Richard Hernandez 80 y o  male MRN: [id number]  Unit/Bed#: -01 Encounter: [id number]  Reason for Consult: DINO (POA) on CKD    ASSESSMENT/PLAN:  1  DINO (POA): pre-renal ATN vs post-obstructive with urinary retention  Creat  Improving with IVF  -Renal US: no hydronephrosis, tiny cyst    -UA: 49031/10) glucose, trace blood, trace protein, 4-10 RBC's, 0-1 WBC's and hyaline casts    -good urine OP   -Hold home diuretic, on Lasix 40 mg BID, metolazone 5 mg daily and spirnalactone 25 mg daily  - discontinue IVF secondary to SOB, increased O2 demand, and weight gain  Per son, fluid gains in his abdomen       2  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  Does not follow with prior nephrologist  Likely diabetic nephropathy and hypertensive sclerosis contributing    -Will need OP follow up       3  BPH with urinary retention:   -PVR with urinary retention protocol  Last  ml  Remains with elmore catheter       4  CHF/volume status: Per documentation, ?18 lb weight gain since admission  Per son, usually around 190 lbs, when reaches 195, lasix is usually increased  IVF discontinued    -Daily weights, strict I/O  -Echo: EF=30-35%  -Chest X-ray: mild pulmonary vascular congestive changes, left > right pleural effusions, possible atelectasis vs pneumonia  -Restart Lasix 40 mg BID PO       5  HTN: blood pressure is low for age recommendations  Goal 's    -continue metoprolol with hold parameters       6  Anemia: S/P fall with flank hematoma as well as reported dark stools  Received 1 U PRBC's  Hgb remains stable  Continue to monitor and transfuse as needed  -EGD: moderate-severe gastritis  7  Distended abdomin:   -check obstruction series    8  Left arm pain:  -X-ray: negative for fracture, elbow joint effusion       Other: diabetes, HLD, COPD        SUBJECTIVE:  Mr Courtney Olivia was examined in stretcher prior to x-ray  He states that he is doing okay   He denies any chest pain or increased SOB, however is requiring more supplemental O2  He denies any N/V/D  A elmore catheter remains in place and is patent for clear yellow urine  Per his son, he normally weighs about 190 lbs at home and his dieretic is typically increased when he reaches about 195 lbs  He normally gains his weight in his abdomin  He complains of left sided shoulder and arm pain  X-rays were negative for fractures and showed a left elbow non-specific joint effusion       OBJECTIVE:  Current Weight: Weight - Scale: 88 8 kg (195 lb 12 3 oz)  Vitals:    07/03/18 0030 07/03/18 0400 07/03/18 0600 07/03/18 0928   BP: (!) 97/47 129/66 116/55    BP Location: Left arm Left arm Right arm    Pulse: 73 68 66 68   Resp: 20 22 22 20   Temp: 99 2 °F (37 3 °C) 98 5 °F (36 9 °C) 98 3 °F (36 8 °C)    TempSrc: Axillary Oral Oral    SpO2: 96% 96% 96% 97%   Weight:   88 8 kg (195 lb 12 3 oz)    Height:           Intake/Output Summary (Last 24 hours) at 07/03/18 1028  Last data filed at 07/03/18 0601   Gross per 24 hour   Intake             1583 ml   Output             1097 ml   Net              486 ml     General: No apparent distress, on stretcher  Skin: warm, dry, intact, no rash  HEENT: Moist mucous membranes, sclera anicteric, normocephalic  Neck: No apparent JVD appreciated  Chest: Lung sounds decreased lower lobes, slight crackles, on 3 L O2 via NC  Heart: Regular rate and rhythm, No murmer  Abdomen: Soft, round, NT, +BS, slightly distneded  Extremities: No B/L LE edema present  Neuro: Alert and oriented  Psych: Appropriate mood and affect    Medications:    Current Facility-Administered Medications:     acetaminophen (TYLENOL) tablet 650 mg, 650 mg, Oral, Q6H PRN, Ronnie Arredondo MD, 650 mg at 07/01/18 2016    albuterol inhalation solution 2 5 mg, 2 5 mg, Nebulization, Q6H PRN, Ronnie Arredondo MD, 2 5 mg at 07/01/18 1954    budesonide (PULMICORT) inhalation solution 0 5 mg, 0 5 mg, Nebulization, BID, Ronnie Arredondo MD, 0 5 mg at 07/03/18 0925    cefTRIAXone (ROCEPHIN) IVPB (premix) 1,000 mg, 1,000 mg, Intravenous, Q24H, Mary Kate Fly, DO    fluticasone-salmeterol (ADVAIR) 250-50 mcg/dose inhaler 1 puff, 1 puff, Inhalation, Q12H, Marly Velazquez MD, 1 puff at 07/03/18 0925    furosemide (LASIX) tablet 40 mg, 40 mg, Oral, Daily, Mary Kate Fly, DO, 40 mg at 07/03/18 0837    glimepiride (AMARYL) tablet 1 mg, 1 mg, Oral, Daily With Breakfast, Mary Kate Fly, DO, 1 mg at 07/03/18 0832    HYDROcodone-acetaminophen (NORCO) 5-325 mg per tablet 1 tablet, 1 tablet, Oral, Q6H PRN, Marly Velazquez MD, 1 tablet at 07/03/18 0409    insulin lispro (HumaLOG) 100 units/mL subcutaneous injection 1-5 Units, 1-5 Units, Subcutaneous, HS, ROMAINE Dillon, 3 Units at 07/02/18 2145    insulin lispro (HumaLOG) 100 units/mL subcutaneous injection 1-6 Units, 1-6 Units, Subcutaneous, TID AC, 1 Units at 07/03/18 0620 **AND** Fingerstick Glucose (POCT), , , TID AC, Yong Chay Dimas MD    latanoprost (XALATAN) 0 005 % ophthalmic solution 1 drop, 1 drop, Both Eyes, HS, Marly Velazquez MD, 1 drop at 07/02/18 2144    menthol-methyl salicylate (BENGAY) 21-42 % cream, , Apply externally, 4x Daily PRN, ROMAINE Moe, 1 application at 14/37/46 2008    metoprolol succinate (TOPROL-XL) 24 hr tablet 25 mg, 25 mg, Oral, Daily, ROMAINE Warren, 25 mg at 07/03/18 0836    metroNIDAZOLE (FLAGYL) IVPB (premix) 500 mg, 500 mg, Intravenous, Q8H, Mary Kate Fly, DO    ondansetron (ZOFRAN) injection 4 mg, 4 mg, Intravenous, Q6H PRN, Marly Velazquez MD    ondansetron (ZOFRAN) injection 4 mg, 4 mg, Intravenous, Once PRN, Phil Zimmer DO    pantoprazole (PROTONIX) EC tablet 40 mg, 40 mg, Oral, Early Morning, Janny Bernabe MD, 40 mg at 07/03/18 0620    pravastatin (PRAVACHOL) tablet 40 mg, 40 mg, Oral, Daily With Lolita Mueller MD, 40 mg at 07/02/18 1718    sitaGLIPtin (JANUVIA) tablet 25 mg, 25 mg, Oral, Daily, Mary Kate Joseph DO, 25 mg at 07/03/18 0841   sodium chloride 0 9 % infusion, 60 mL/hr, Intravenous, Continuous, Nilson Hoskins MD, Last Rate: 60 mL/hr at 07/02/18 1908, 60 mL/hr at 07/02/18 1908    traMADol (ULTRAM) tablet 50 mg, 50 mg, Oral, Q6H PRN, Samra Juan Francisco, DO, 50 mg at 07/03/18 0835    Laboratory Results:    Results from last 7 days  Lab Units 07/03/18  0722 07/03/18  0459 07/02/18  0503 07/02/18  0502 07/02/18  0032 07/01/18  1923 07/01/18  1237 07/01/18  0744   WBC Thousand/uL  --  11 06*  --  9 58  --   --   --  15 45*   HEMOGLOBIN g/dL  --  8 0*  --  7 6* 7 8* 7 8* 7 4* 7 5*   HEMATOCRIT %  --  26 3*  --  24 4* 24 9* 24 9* 24 4* 25 6*   PLATELETS Thousands/uL  --  206  --  216  --   --   --  286   SODIUM mmol/L 142  --  142  --   --  139  --  138   POTASSIUM mmol/L 3 8  --  3 9  --   --  4 2  --  4 8   CHLORIDE mmol/L 104  --  102  --   --  100  --  95*   CO2 mmol/L 33*  --  32  --   --  33*  --  31   BUN mg/dL 93*  --  99*  --   --  103*  --  103*   CREATININE mg/dL 2 54*  --  2 56*  --   --  2 52*  --  2 81*   CALCIUM mg/dL 7 9*  --  7 8*  --   --  8 2*  --  8 0*   MAGNESIUM mg/dL  --   --  2 3  --   --   --   --   --    PHOSPHORUS mg/dL  --   --  4 4*  --   --   --   --   --    TOTAL PROTEIN g/dL  --   --  6 2*  --   --   --   --  7 4   GLUCOSE RANDOM mg/dL 159*  --  135  --   --  208*  --  342*

## 2018-07-03 NOTE — PLAN OF CARE
Problem: PHYSICAL THERAPY ADULT  Goal: Performs mobility at highest level of function for planned discharge setting  See evaluation for individualized goals  Treatment/Interventions: Functional transfer training, LE strengthening/ROM, Therapeutic exercise, Endurance training, Bed mobility, Gait training, Spoke to nursing, OT  Equipment Recommended: Jaimie Regalado, PT         See flowsheet documentation for full assessment, interventions and recommendations  Outcome: Progressing  Prognosis: Fair  Problem List: Decreased strength, Impaired balance, Decreased range of motion, Decreased endurance, Decreased safety awareness, Decreased mobility, Decreased cognition, Impaired judgement  Assessment: Pt continues with increased lethargy however able to progress with mobility to get OOB to bedside chair  Required max A for seated EOB balance as well as mobility to chair however slight imoprovement in alertness noted oce OOB to chair  Will continue to assess/progress as able  Recommendation: Post acute IP rehab          See flowsheet documentation for full assessment

## 2018-07-04 PROBLEM — K29.00 ACUTE EROSIVE GASTRITIS: Status: ACTIVE | Noted: 2018-07-04

## 2018-07-04 PROBLEM — I50.23 ACUTE ON CHRONIC SYSTOLIC CONGESTIVE HEART FAILURE (HCC): Status: ACTIVE | Noted: 2018-07-01

## 2018-07-04 LAB
ANION GAP SERPL CALCULATED.3IONS-SCNC: 6 MMOL/L (ref 4–13)
ARTERIAL PATENCY WRIST A: ABNORMAL
BASE EXCESS BLDA CALC-SCNC: 8 MMOL/L (ref -2–3)
BUN SERPL-MCNC: 86 MG/DL (ref 5–25)
CALCIUM SERPL-MCNC: 7.9 MG/DL (ref 8.3–10.1)
CHLORIDE SERPL-SCNC: 103 MMOL/L (ref 100–108)
CO2 SERPL-SCNC: 32 MMOL/L (ref 21–32)
CREAT SERPL-MCNC: 2.21 MG/DL (ref 0.6–1.3)
DS:DELIVERY SYSTEM: ABNORMAL
ERYTHROCYTE [DISTWIDTH] IN BLOOD BY AUTOMATED COUNT: 15 % (ref 11.6–15.1)
FIO2 GAS DIL.REBREATH: 32 L
GFR SERPL CREATININE-BSD FRML MDRD: 25 ML/MIN/1.73SQ M
GLUCOSE SERPL-MCNC: 196 MG/DL (ref 65–140)
GLUCOSE SERPL-MCNC: 201 MG/DL (ref 65–140)
GLUCOSE SERPL-MCNC: 235 MG/DL (ref 65–140)
GLUCOSE SERPL-MCNC: 278 MG/DL (ref 65–140)
GLUCOSE SERPL-MCNC: 289 MG/DL (ref 65–140)
HCO3 BLDA-SCNC: 32.6 MMOL/L (ref 22–28)
HCT VFR BLD AUTO: 26.1 % (ref 36.5–49.3)
HGB BLD-MCNC: 7.8 G/DL (ref 12–17)
MAGNESIUM SERPL-MCNC: 2.5 MG/DL (ref 1.6–2.6)
MCH RBC QN AUTO: 27.6 PG (ref 26.8–34.3)
MCHC RBC AUTO-ENTMCNC: 29.9 G/DL (ref 31.4–37.4)
MCV RBC AUTO: 92 FL (ref 82–98)
PCO2 BLD: 34 MMOL/L (ref 21–32)
PCO2 BLD: 43.8 MM HG (ref 36–44)
PH BLD: 7.48 [PH] (ref 7.35–7.45)
PLATELET # BLD AUTO: 207 THOUSANDS/UL (ref 149–390)
PMV BLD AUTO: 10.6 FL (ref 8.9–12.7)
PO2 BLD: 96 MM HG (ref 75–129)
POTASSIUM SERPL-SCNC: 4.3 MMOL/L (ref 3.5–5.3)
PROCALCITONIN SERPL-MCNC: 0.69 NG/ML
RBC # BLD AUTO: 2.83 MILLION/UL (ref 3.88–5.62)
SAMPLE SITE: ABNORMAL
SAO2 % BLD FROM PO2: 98 % (ref 95–98)
SODIUM SERPL-SCNC: 141 MMOL/L (ref 136–145)
SPECIMEN SOURCE: ABNORMAL
WBC # BLD AUTO: 11.05 THOUSAND/UL (ref 4.31–10.16)

## 2018-07-04 PROCEDURE — 83735 ASSAY OF MAGNESIUM: CPT | Performed by: INTERNAL MEDICINE

## 2018-07-04 PROCEDURE — 97166 OT EVAL MOD COMPLEX 45 MIN: CPT

## 2018-07-04 PROCEDURE — 82803 BLOOD GASES ANY COMBINATION: CPT

## 2018-07-04 PROCEDURE — 36600 WITHDRAWAL OF ARTERIAL BLOOD: CPT

## 2018-07-04 PROCEDURE — 99232 SBSQ HOSP IP/OBS MODERATE 35: CPT | Performed by: INTERNAL MEDICINE

## 2018-07-04 PROCEDURE — 80048 BASIC METABOLIC PNL TOTAL CA: CPT | Performed by: INTERNAL MEDICINE

## 2018-07-04 PROCEDURE — 99233 SBSQ HOSP IP/OBS HIGH 50: CPT | Performed by: INTERNAL MEDICINE

## 2018-07-04 PROCEDURE — G8988 SELF CARE GOAL STATUS: HCPCS

## 2018-07-04 PROCEDURE — 82948 REAGENT STRIP/BLOOD GLUCOSE: CPT

## 2018-07-04 PROCEDURE — 84145 PROCALCITONIN (PCT): CPT | Performed by: INTERNAL MEDICINE

## 2018-07-04 PROCEDURE — G8987 SELF CARE CURRENT STATUS: HCPCS

## 2018-07-04 PROCEDURE — 94640 AIRWAY INHALATION TREATMENT: CPT

## 2018-07-04 PROCEDURE — 94760 N-INVAS EAR/PLS OXIMETRY 1: CPT

## 2018-07-04 PROCEDURE — 85027 COMPLETE CBC AUTOMATED: CPT | Performed by: INTERNAL MEDICINE

## 2018-07-04 RX ORDER — INSULIN GLARGINE 100 [IU]/ML
8 INJECTION, SOLUTION SUBCUTANEOUS
Status: DISCONTINUED | OUTPATIENT
Start: 2018-07-04 | End: 2018-07-05

## 2018-07-04 RX ORDER — FUROSEMIDE 10 MG/ML
20 INJECTION INTRAMUSCULAR; INTRAVENOUS
Status: DISCONTINUED | OUTPATIENT
Start: 2018-07-04 | End: 2018-07-06

## 2018-07-04 RX ADMIN — INSULIN LISPRO 3 UNITS: 100 INJECTION, SOLUTION INTRAVENOUS; SUBCUTANEOUS at 09:20

## 2018-07-04 RX ADMIN — BUDESONIDE 0.5 MG: 0.5 INHALANT RESPIRATORY (INHALATION) at 10:07

## 2018-07-04 RX ADMIN — BUDESONIDE 0.5 MG: 0.5 INHALANT RESPIRATORY (INHALATION) at 19:31

## 2018-07-04 RX ADMIN — IPRATROPIUM BROMIDE AND ALBUTEROL SULFATE 3 ML: 2.5; .5 SOLUTION RESPIRATORY (INHALATION) at 19:31

## 2018-07-04 RX ADMIN — METOPROLOL SUCCINATE 25 MG: 25 TABLET, EXTENDED RELEASE ORAL at 09:22

## 2018-07-04 RX ADMIN — FUROSEMIDE 20 MG: 10 INJECTION, SOLUTION INTRAVENOUS at 09:23

## 2018-07-04 RX ADMIN — IPRATROPIUM BROMIDE AND ALBUTEROL SULFATE 3 ML: 2.5; .5 SOLUTION RESPIRATORY (INHALATION) at 10:07

## 2018-07-04 RX ADMIN — FUROSEMIDE 20 MG: 10 INJECTION, SOLUTION INTRAVENOUS at 11:49

## 2018-07-04 RX ADMIN — INSULIN LISPRO 2 UNITS: 100 INJECTION, SOLUTION INTRAVENOUS; SUBCUTANEOUS at 09:21

## 2018-07-04 RX ADMIN — LATANOPROST 1 DROP: 50 SOLUTION OPHTHALMIC at 22:22

## 2018-07-04 RX ADMIN — INSULIN LISPRO 3 UNITS: 100 INJECTION, SOLUTION INTRAVENOUS; SUBCUTANEOUS at 11:50

## 2018-07-04 RX ADMIN — INSULIN GLARGINE 8 UNITS: 100 INJECTION, SOLUTION SUBCUTANEOUS at 22:23

## 2018-07-04 RX ADMIN — INSULIN LISPRO 3 UNITS: 100 INJECTION, SOLUTION INTRAVENOUS; SUBCUTANEOUS at 17:08

## 2018-07-04 RX ADMIN — PREDNISONE 10 MG: 10 TABLET ORAL at 09:22

## 2018-07-04 RX ADMIN — METRONIDAZOLE 500 MG: 500 INJECTION, SOLUTION INTRAVENOUS at 05:35

## 2018-07-04 RX ADMIN — PRAVASTATIN SODIUM 40 MG: 40 TABLET ORAL at 17:08

## 2018-07-04 RX ADMIN — IPRATROPIUM BROMIDE AND ALBUTEROL SULFATE 3 ML: 2.5; .5 SOLUTION RESPIRATORY (INHALATION) at 13:55

## 2018-07-04 RX ADMIN — PANTOPRAZOLE SODIUM 40 MG: 40 TABLET, DELAYED RELEASE ORAL at 05:35

## 2018-07-04 RX ADMIN — INSULIN LISPRO 4 UNITS: 100 INJECTION, SOLUTION INTRAVENOUS; SUBCUTANEOUS at 17:08

## 2018-07-04 RX ADMIN — INSULIN LISPRO 3 UNITS: 100 INJECTION, SOLUTION INTRAVENOUS; SUBCUTANEOUS at 11:49

## 2018-07-04 RX ADMIN — INSULIN LISPRO 2 UNITS: 100 INJECTION, SOLUTION INTRAVENOUS; SUBCUTANEOUS at 06:01

## 2018-07-04 RX ADMIN — INSULIN LISPRO 3 UNITS: 100 INJECTION, SOLUTION INTRAVENOUS; SUBCUTANEOUS at 22:22

## 2018-07-04 RX ADMIN — FUROSEMIDE 20 MG: 10 INJECTION, SOLUTION INTRAVENOUS at 17:08

## 2018-07-04 NOTE — PROGRESS NOTES
Progress Note - Lawerance Cooke 3/15/1927, 80 y o  male MRN: [id number]    Unit/Bed#: -01 Encounter: [id number]    Primary Care Provider: Lisa Slater MD   Date and time admitted to hospital: 7/1/2018  7:23 AM        * Acute erosive gastritis   Assessment & Plan    EGD showed erosive gastritis  Patient has no recurrent signs of bleeding  Will continue Protonix p   O , will continue monitor hemoglobin which is 7 8 this morning        Acute blood loss anemia   Assessment & Plan    Hemoglobin 7 8 this morning stable         Acute kidney injury [facility])   Assessment & Plan    Urine output was 2300 yesterday but overall patient is still in positive fluid balance, creatinine decreased to 2 21        Type 2 diabetes mellitus with stage 3 chronic kidney disease, without long-term current use of insulin [facility])   Assessment & Plan    No results found for: HGBA1C    Recent Labs      07/03/18   1131  07/03/18   1551  07/03/18   2110  07/04/18   0551   POCGLU  137  164*  205*  196*       Blood Sugar Average: Last 72 hrs:  (P) 215 [id number]     Fasting blood sugar is 196 and blood sugar was 205 yesterday  will switch to Lantus and pre meal Humalog will stop glimepiride and Januvia         COPD (chronic obstructive pulmonary disease) (HCC)   Assessment & Plan    Stable on Pulmicort, prednisone, DuoNeb without wheezing        Acute on chronic systolic congestive heart failure (HCC)   Assessment & Plan    Patient's hypoxia, shortness of breath is most consistent with acute systolic CHF ejection fraction was 30 35% 2 days ago  Patient received blood IV fluids for the anemia  His swallowing is normal as per speech evaluation  I doubt aspiration pneumonia this is more likely CHF causes his crackles on physical examination  Will start patient on Lasix 20 mg IV t i d  blood pressure permitting    To blood pressure being borderline and patient having acute kidney injury I will not start patient on Ace inhibitor or ARB     Discussed with Dr Cosmo Palma, Pulmonary            VTE Prophylaxis: in place    Patient Centered Rounds: I rounded with patient's nurse    Current Length of Stay: 3 day(s)    Current Patient Status: Inpatient    Certification Statement: Pt requires additional inpatient hospital stay due to: see assessment and plan        Subjective:   Patient denies shortness of breath on oxygen  Denies chest pain, palpitations, nausea, abdominal pain, signs of bleeding  Patient's nurse reports that patient's urinary tension Moralez catheter had to be placed  He had no overt bleeding overnight    All other ROS are negative    Objective:     Vitals:   Temp (24hrs), Av 1 °F (36 7 °C), Min:97 7 °F (36 5 °C), Max:98 6 °F (37 °C)    HR:  [] 103  Resp:  [18-22] 18  BP: (102-125)/(56-60) 109/57  SpO2:  [92 %-98 %] 96 %  Body mass index is 32 52 kg/m²  Input and Output Summary (last 24 hours): Intake/Output Summary (Last 24 hours) at 18 0839  Last data filed at 18 0545   Gross per 24 hour   Intake              939 ml   Output             2300 ml   Net            -1361 ml       Physical Exam:     Physical Exam   Constitutional: He appears well-developed  No distress  HENT:   Head: Normocephalic  Mouth/Throat: Oropharynx is clear and moist    Eyes: Conjunctivae are normal    Neck: Neck supple  JVD present  Cardiovascular: Normal rate and regular rhythm  Pulmonary/Chest: Effort normal  No respiratory distress  He has no wheezes  He has rales (Inspiratory crackles are heard at the bases)  Abdominal: Soft  Bowel sounds are normal  He exhibits no distension  There is no tenderness  Musculoskeletal: He exhibits no tenderness  Lymphadenopathy:     He has no cervical adenopathy  Neurological: He is alert  No cranial nerve deficit  Skin: Skin is warm and dry  No rash noted  Psychiatric: He has a normal mood and affect  Vitals reviewed            I personally reviewed labs and imaging reports for today  Last 24 Hours Medication List:     Current Facility-Administered Medications:  acetaminophen 650 mg Oral Q6H PRN Tuyet Ann MD   albuterol 2 5 mg Nebulization Q6H PRN Tuyet Ann MD   budesonide 0 5 mg Nebulization BID Tuyet Ann MD   furosemide 20 mg Intravenous TID (diuretic) Phil Wang MD   HYDROcodone-acetaminophen 1 tablet Oral Q6H PRN Tuyet Ann MD   insulin lispro 1-5 Units Subcutaneous HS ROMAINE Dillon   insulin lispro 1-6 Units Subcutaneous TID AC Tuyet Ann MD   ipratropium-albuterol 3 mL Nebulization TID Giovani Shellie, DO   latanoprost 1 drop Both Eyes HS Tuyet Ann MD   menthol-methyl salicylate  Apply externally 4x Daily PRN ROMAINE Mosquera   metoprolol succinate 25 mg Oral Daily ROMAINE Davis   ondansetron 4 mg Intravenous Q6H PRN Tuyet Ann MD   ondansetron 4 mg Intravenous Once PRN Nuala Sutherland, DO   pantoprazole 40 mg Oral Early Morning Adriel Ortiz MD   pravastatin 40 mg Oral Daily With Chandrakant Echeverria MD   predniSONE 10 mg Oral Daily With Breakfast Carbon Hilledson Hensley, DO   sitaGLIPtin 25 mg Oral Daily Mary Kateyolande Joseph, DO   traMADol 50 mg Oral Q6H PRN Mavis Cormier DO          Today, Patient Was Seen By: Phil Wang MD    ** Please Note: Dictation voice to text software may have been used in the creation of this document   **

## 2018-07-04 NOTE — PLAN OF CARE
Problem: OCCUPATIONAL THERAPY ADULT  Goal: Performs self-care activities at highest level of function for planned discharge setting  See evaluation for individualized goals  Treatment Interventions: ADL retraining, UE strengthening/ROM          See flowsheet documentation for full assessment, interventions and recommendations  Limitation: Decreased ADL status, Decreased UE strength, Decreased cognition, Decreased high-level ADLs, Decreased endurance  Prognosis: Fair  Assessment: Pt is a 80 y o  male seen for OT evaluation s/p admit to [facility] on 7/1/2018 w/ Acute erosive gastritis  Pt  Has been declining x past few weeks and presents s/p fall  Comorbidities affecting pt's functional performance at time of assessment include: DM, HTN, limited vision, COPD and CHF  Personal factors affecting pt at time of IE include:difficulty performing ADLS and health management   Prior to admission, pt was requiring increased assistance x several weeks for ADLS and transfers  Pt  Was primarily using w/c  Upon evaluation: Pt requires mod assist for sit> stand and mod-max assist for self care  2* the following deficits impacting occupational performance: weakness, decreased strength, decreased balance, decreased tolerance and increased pain  Pt  Also with c/o L ue pain however not limited during session  Pt to benefit from continued skilled OT tx while in the hospital to address deficits as defined above and maximize level of functional independence w ADL's and functional mobility  Occupational Performance areas to address include: eating, grooming, bathing/shower, functional mobility and clothing management  From OT standpoint, recommendation at time of d/c would be inpatient rehab         OT Discharge Recommendation: Short Term Rehab

## 2018-07-04 NOTE — ASSESSMENT & PLAN NOTE
Urine output was 2300 yesterday but overall patient is still in positive fluid balance, creatinine decreased to 2 21

## 2018-07-04 NOTE — PROGRESS NOTES
NEPHROLOGY PROGRESS NOTE   Warren Spurling 80 y o  male MRN: [id number]  Unit/Bed#: -01 Encounter: [id number]  Reason for Consult:  Acute kidney injury    ASSESSMENT/PLAN:  1  Acute kidney injury, likely previously secondary ATN plus obstructive uropathy, now possible component of cardiorenal syndrome given volume overload, overall improving  2  Chronic kidney disease, baseline creatinine 1 5  3  Acute on chronic anemia, EGD showed erosive gastritis  4  Cardiomyopathy, ejection fraction 35%     PLAN:  · Overall renal function seems to be improving  · Continue with IV diuresis  · Blood pressure stable  · No change in current regimen    SUBJECTIVE:  Seen and examined  Family at bedside  Still mildly short of breath but overall improved  No active chest pain  No abdominal pain  Review of Systems    OBJECTIVE:  Current Weight: Weight - Scale: 91 4 kg (201 lb 8 oz)  Vitals:    07/04/18 0900 07/04/18 0921 07/04/18 1119 07/04/18 1356   BP:  113/55 110/58    BP Location:   Right arm    Pulse:  72 83    Resp:   18    Temp:   97 6 °F (36 4 °C)    TempSrc:   Oral    SpO2: 95%  91% 92%   Weight:       Height:           Intake/Output Summary (Last 24 hours) at 07/04/18 1532  Last data filed at 07/04/18 1436   Gross per 24 hour   Intake             1040 ml   Output             2825 ml   Net            -1785 ml       Physical Exam   Constitutional: No distress  HENT:   Head: Normocephalic  Eyes: No scleral icterus  Neck: Neck supple  Cardiovascular: Normal rate and regular rhythm  Pulmonary/Chest: Effort normal  He has decreased breath sounds in the right lower field and the left lower field  He has rales in the right lower field and the left lower field  Abdominal: Soft  He exhibits no distension  Musculoskeletal: He exhibits edema (Trace to 1+ edema bilaterally)  Neurological: He is alert  Skin: Skin is warm and dry  Psychiatric: He has a normal mood and affect         Medications:    Current Facility-Administered Medications:     acetaminophen (TYLENOL) tablet 650 mg, 650 mg, Oral, Q6H PRN, Tru Lopez MD, 650 mg at 07/01/18 2016    albuterol inhalation solution 2 5 mg, 2 5 mg, Nebulization, Q6H PRN, Tru Lopez MD, 2 5 mg at 07/01/18 1954    budesonide (PULMICORT) inhalation solution 0 5 mg, 0 5 mg, Nebulization, BID, Tru Lopez MD, 0 5 mg at 07/04/18 1007    furosemide (LASIX) injection 20 mg, 20 mg, Intravenous, TID (diuretic), Preet Ortiz MD, 20 mg at 07/04/18 1149    HYDROcodone-acetaminophen (NORCO) 5-325 mg per tablet 1 tablet, 1 tablet, Oral, Q6H PRN, Tru Lopez MD, 1 tablet at 07/03/18 1120    insulin glargine (LANTUS) subcutaneous injection 8 Units 0 08 mL, 8 Units, Subcutaneous, HS, Preet Ortiz MD    insulin lispro (HumaLOG) 100 units/mL subcutaneous injection 1-5 Units, 1-5 Units, Subcutaneous, HS, ROMAINE Dillon, 1 Units at 07/03/18 2206    insulin lispro (HumaLOG) 100 units/mL subcutaneous injection 1-6 Units, 1-6 Units, Subcutaneous, TID AC, 3 Units at 07/04/18 1150 **AND** Fingerstick Glucose (POCT), , , TID AC, Tru Lopez MD    insulin lispro (HumaLOG) 100 units/mL subcutaneous injection 3 Units, 3 Units, Subcutaneous, TID With Meals, Preet Ortiz MD, 3 Units at 07/04/18 1149    ipratropium-albuterol (DUO-NEB) 0 5-2 5 mg/3 mL inhalation solution 3 mL, 3 mL, Nebulization, TID, Ellen Foster DO, 3 mL at 07/04/18 1355    latanoprost (XALATAN) 0 005 % ophthalmic solution 1 drop, 1 drop, Both Eyes, HS, Tru Lopez MD, 1 drop at 07/03/18 2206    menthol-methyl salicylate (BENGAY) 06-05 % cream, , Apply externally, 4x Daily PRN, Alesha Banco, CRNP, 1 application at 75/25/90 2008    metoprolol succinate (TOPROL-XL) 24 hr tablet 25 mg, 25 mg, Oral, Daily, ROMAINE Milan, 25 mg at 07/04/18 0922    ondansetron (ZOFRAN) injection 4 mg, 4 mg, Intravenous, Q6H PRN, Tru Lopez MD    ondansetron [facility]) injection 4 mg, 4 mg, Intravenous, Once PRN, Jocelyn Pereira DO    pantoprazole (PROTONIX) EC tablet 40 mg, 40 mg, Oral, Early Morning, Kristine Mcgiure MD, 40 mg at 07/04/18 0535    pravastatin (PRAVACHOL) tablet 40 mg, 40 mg, Oral, Daily With Elin Gimenez MD, 40 mg at 07/03/18 1722    predniSONE tablet 10 mg, 10 mg, Oral, Daily With Breakfast, Don Carrero DO, 10 mg at 07/04/18 5199    traMADol (ULTRAM) tablet 50 mg, 50 mg, Oral, Q6H PRN, Sandoval Agarwal DO, 50 mg at 07/03/18 1735    Laboratory Results:    Results from last 7 days  Lab Units 07/04/18  0432 07/03/18  0722 07/03/18  0459 07/02/18  0503 07/02/18  0502 07/02/18  0032 07/01/18  1923 07/01/18  1237 07/01/18  0744   WBC Thousand/uL 11 05*  --  11 06*  --  9 58  --   --   --  15 45*   HEMOGLOBIN g/dL 7 8*  --  8 0*  --  7 6* 7 8* 7 8* 7 4* 7 5*   HEMATOCRIT % 26 1*  --  26 3*  --  24 4* 24 9* 24 9* 24 4* 25 6*   PLATELETS Thousands/uL 207  --  206  --  216  --   --   --  286   SODIUM mmol/L 141 142  --  142  --   --  139  --  138   POTASSIUM mmol/L 4 3 3 8  --  3 9  --   --  4 2  --  4 8   CHLORIDE mmol/L 103 104  --  102  --   --  100  --  95*   CO2 mmol/L 32 33*  --  32  --   --  33*  --  31   BUN mg/dL 86* 93*  --  99*  --   --  103*  --  103*   CREATININE mg/dL 2 21* 2 54*  --  2 56*  --   --  2 52*  --  2 81*   CALCIUM mg/dL 7 9* 7 9*  --  7 8*  --   --  8 2*  --  8 0*   MAGNESIUM mg/dL 2 5  --   --  2 3  --   --   --   --   --    PHOSPHORUS mg/dL  --   --   --  4 4*  --   --   --   --   --    TOTAL PROTEIN g/dL  --   --   --  6 2*  --   --   --   --  7 4   GLUCOSE RANDOM mg/dL 201* 159*  --  135  --   --  208*  --  342*

## 2018-07-04 NOTE — ASSESSMENT & PLAN NOTE
Patient's hypoxia, shortness of breath is most consistent with acute systolic CHF ejection fraction was 30 35% 2 days ago  Patient received blood IV fluids for the anemia  His swallowing is normal as per speech evaluation  I doubt aspiration pneumonia this is more likely CHF causes his crackles on physical examination  Will start patient on Lasix 20 mg IV t i d  blood pressure permitting  To blood pressure being borderline and patient having acute kidney injury I will not start patient on Ace inhibitor or ARB      Discussed with Dr José Luis Gerard, Pulmonary

## 2018-07-04 NOTE — PROGRESS NOTES
David Ulrich  [id number]    84 y o   male      ASSESSMENT  1   Symptomatic anemia with melena  EGD 7/2 showed erosive gastritis and a small hiatal hernia  No overt bleeding site identified  Hemoglobin remains stable   2   Acute anemia probably acute blood loss anemia  3   GERD on Pepcid  4   Distant history of iron deficiency anemia and occult GI bleeding   S/P EGD/colonoscopy/capsule endoscopy 2007   Treated with a year course of oral iron  5   Distant history of peptic ulcer disease  6   Distant history of colon cancer  7   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  with congestive heart failure followed by Dr Angeline Mcgraw  8   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  9   H/O AAA  10   DM    PLAN  Hemoglobin stable without overt signs of GI bleeding  Continue oral PPI  Tolerating diet without abdominal pain  Hold on colonoscopy unless hemoglobin declines further  Chief Complaint   Patient presents with    Fall     [facility] in from [facility] after falling out of his wheelchair today  Staff states this is the 3rd time he has done this in 2 days  Patietn initially complaisned of back pain but none upon arrival       SUBJECTIVE/HPI   Son at bedside  The patient tolerated lunch without digestive complaints      /58 (BP Location: Right arm)   Pulse 83   Temp 97 6 °F (36 4 °C) (Oral)   Resp 18   Ht 5' 6" (1 676 m)   Wt 91 4 kg (201 lb 8 oz)   SpO2 91%   BMI 32 52 kg/m²     PHYSICALEXAM  General appearance: alert, appears stated age and cooperative  Head: Normocephalic, without obvious abnormality, atraumatic  Lungs: clear to auscultation bilaterally  Heart: regular rate and rhythm, S1, S2 normal, no murmur, click, rub or gallop  Abdomen: soft, non-tender; bowel sounds normal; no masses,  no organomegaly  Extremities: extremities normal, atraumatic, no cyanosis or edema  Neurologic: Grossly normal    Lab Results   Component Value Date    GLUCOSE 201 (H) 07/04/2018    CALCIUM 7 9 (L) 07/04/2018     07/04/2018    K 4 3 07/04/2018    CO2 32 07/04/2018     07/04/2018    BUN 86 (H) 07/04/2018    CREATININE 2 21 (H) 07/04/2018     Lab Results   Component Value Date    WBC 11 05 (H) 07/04/2018    HGB 7 8 (L) 07/04/2018    HCT 26 1 (L) 07/04/2018    MCV 92 07/04/2018     07/04/2018     Lab Results   Component Value Date    ALT 20 07/02/2018    AST 22 07/02/2018    ALKPHOS 53 07/02/2018    BILITOT 0 80 07/02/2018     No results found for: AMYLASE  No results found for: LIPASE  Lab Results   Component Value Date    IRON 18 (L) 07/02/2018    TIBC 423 07/02/2018    FERRITIN 47 07/02/2018     Lab Results   Component Value Date    INR 1 17 07/01/2018

## 2018-07-04 NOTE — ASSESSMENT & PLAN NOTE
EGD showed erosive gastritis  Patient has no recurrent signs of bleeding    Will continue Protonix p   O , will continue monitor hemoglobin which is 7 8 this morning

## 2018-07-04 NOTE — ASSESSMENT & PLAN NOTE
No results found for: HGBA1C    Recent Labs      07/03/18   1131  07/03/18   1551  07/03/18   2110  07/04/18   0551   POCGLU  137  164*  205*  196*       Blood Sugar Average: Last 72 hrs:  (P) 215 [id number]     Fasting blood sugar is 196 and blood sugar was 205 yesterday   will switch to Lantus and pre meal Humalog will stop glimepiride and Januvia

## 2018-07-04 NOTE — PLAN OF CARE
CARDIOVASCULAR - ADULT     Absence of cardiac dysrhythmias or at baseline rhythm Progressing        DISCHARGE PLANNING     Discharge to home or other facility with appropriate resources Progressing        DISCHARGE PLANNING - CARE MANAGEMENT     Discharge to post-acute care or home with appropriate resources Progressing        HEMATOLOGIC - ADULT     Maintains hematologic stability Progressing        INFECTION - ADULT     Absence or prevention of progression during hospitalization Progressing        Knowledge Deficit     Patient/family/caregiver demonstrates understanding of disease process, treatment plan, medications, and discharge instructions Progressing        METABOLIC, FLUID AND ELECTROLYTES - ADULT     Electrolytes maintained within normal limits Progressing     Fluid balance maintained Progressing     Glucose maintained within target range Progressing        PAIN - ADULT     Verbalizes/displays adequate comfort level or baseline comfort level Progressing        Potential for Falls     Patient will remain free of falls Progressing        Prexisting or High Potential for Compromised Skin Integrity     Skin integrity is maintained or improved Progressing        RESPIRATORY - ADULT     Achieves optimal ventilation and oxygenation Progressing        SKIN/TISSUE INTEGRITY - ADULT     Incision(s), wounds(s) or drain site(s) healing without S/S of infection Progressing

## 2018-07-04 NOTE — OCCUPATIONAL THERAPY NOTE
Occupational Therapy Evaluation      Jona Gordon    7/4/2018    Patient Active Problem List   Diagnosis    Acute kidney injury ([facility] )    Chronic kidney disease, stage 3    Type 2 diabetes mellitus with stage 3 chronic kidney disease, without long-term current use of insulin (HCC)    Acute on chronic systolic congestive heart failure (HCC)    Acute blood loss anemia    [facility] with [facility]    Hypertension    COPD (chronic obstructive pulmonary disease) ([facility] )    Contusion of flank    Hyperlipidemia    Cancer ([facility] )    ATN (acute tubular necrosis) (HCC)    Parenchymal renal hypertension    Benign prostatic hyperplasia with urinary retention    Acute pain of left shoulder    Acute erosive gastritis       Past Medical History:   Diagnosis Date    Aortic regurgitation     Atrial fibrillation (HCC)     Cancer (HCC)     colon    CHF (congestive heart failure) (HCC)     Complete heart block (HCC)     post CABG    COPD (chronic obstructive pulmonary disease) ([facility] )     Coronary artery disease     MI    Diabetes mellitus ([facility] )     Dissecting aortic aneurysm ([facility] )     Endocarditis     Hx of bleeding disorder     GI bleed on aspirin    Hypertension     Mitral regurgitation     Skin cancer        Past Surgical History:   Procedure Laterality Date    AORTIC VALVE REPLACEMENT      CARDIAC PACEMAKER PLACEMENT      [employer]     CARDIAC SURGERY      CABG on physician notes, none per pt   CHOLECYSTECTOMY      COLECTOMY      ESOPHAGOGASTRODUODENOSCOPY N/A 7/2/2018    Procedure: ESOPHAGOGASTRODUODENOSCOPY (EGD);   Surgeon: Jose Adamson MD;  Location:  MAIN OR;  Service: Gastroenterology    FLAP LOCAL HEAD / NECK N/A 5/19/2016    Procedure: VERSUS FLAP ;  Surgeon: Mehul Cesar MD;  Location: [facility];  Service:     JOINT REPLACEMENT Left     hip    MITRAL VALVE REPLACEMENT      MOHS RECONSTRUCTION N/A 5/19/2016    Procedure: RECONSTRUCTION MOHS DEFECT NOSE AND SCALP;  Surgeon: Micha Field MD;  [facility]: [facility];  Service:     SKIN BIOPSY          07/04/18 1145   Note Type   Note type Eval/Treat   Restrictions/Precautions   Other Precautions Multiple lines;Telemetry;O2;Fall Risk;Pain   Pain Assessment   Pain Assessment [facility] Pain Intervention(s) Repositioned   Response to Interventions unchanged   Pain Rating: FLACC (Rest) - Face 1   Pain Rating: FLACC (Rest) - Legs 1   Pain Rating: FLACC (Rest) - Activity 2   Pain Rating: FLACC (Rest) - Cry 1   Pain Rating: FLACC (Rest) - Consolability 1   Score: FLACC (Rest) 6   Home Living   Type of Home ([facility]- was receiving rehab recently)   Additional Comments Independent with feeding, set up for UB self care, assist for showering, recently using w/c however prior was walking with rollator or rw   Prior Function   Level of [place] Needs assistance with IADLs; Needs assistance with ADLs and functional mobility   Lifestyle   Autonomy Per dtr was at [facility] requiring assist for showers and transfers using rw  Independent with self feeding  Had recently required increased assistance  Utilizes o2 at 2L  Has always required assist for LB self care however was able to complete UB self care with set up      Reciprocal Relationships supportive son and dtr present during session    Service to Others retired flores; [employer]    [address] limited    Subjective   Subjective "Scratch my back"   ADL   Eating Assistance 4  Minimal Assistance   Grooming Assistance 3  Moderate Assistance   UB Bathing Assistance 3  Moderate Assistance   LB [address] 2  Maximal [address] 3  Moderate Assistance   LB Dressing Assistance 2  Maximal [address]  2  Maximal Assistance   Transfers   Sit to Stand 3  Moderate assistance   Additional items (rw)   Additional Comments x3    Activity Tolerance   Activity Tolerance Patient limited by fatigue   Medical Staff Made Aware spoke with nurse   RUE Assessment   RUE Assessment WFL   LUE Assessment   LUE Assessment WFL  (c/o pain upon admission however no pain during session)   Vision - Complex Assessment   Additional Comments R impaired, L eye cataract    Cognition   Overall Cognitive Status Impaired   Arousal/Participation Lethargic   Attention Attends with cues to redirect   Orientation Level Oriented X4   Following Commands Follows one step commands with increased time or repetition   Assessment   Limitation Decreased ADL status; Decreased UE strength;Decreased cognition;Decreased high-level ADLs; Decreased endurance   Prognosis Fair   Assessment Pt is a 80 y o  male seen for OT evaluation s/p [facility] w/ Acute erosive gastritis  Pt  Has been declining x past few weeks and presents s/p fall  Comorbidities affecting pt's functional performance at time of assessment include: DM, HTN, limited vision, COPD and CHF  Personal factors affecting pt at time of IE include:difficulty performing ADLS and health management   Prior to admission, pt was requiring increased assistance x several weeks for ADLS and transfers  Pt  Was primarily using w/c  Upon evaluation: Pt requires mod assist for sit> stand and mod-max assist for self care  2* the following deficits impacting occupational performance: weakness, decreased strength, decreased balance, decreased tolerance and increased pain  Pt  Also with c/o L ue pain however not limited during session  Pt to benefit from continued skilled OT tx while in the hospital to address deficits as defined above and maximize level of functional independence w ADL's and functional mobility  Occupational Performance areas to address include: eating, grooming, bathing/shower, functional mobility and clothing management  From OT standpoint, recommendation at time of d/c would be inpatient rehab       Goals   Patient Goals to get up and use my rollator again    LTG Time Frame 10-14   Long Term Goal #1 1  Increase standing balance to f+ demonstrating good tolerance ~ 10 minutes to facilitate independence with ADLS 2  Increase independence with functional mobility and transfers using rollator to SBA  to facilitate independence with ADLS 3  Increase independence with grooming and self feeding to SBA 4  Increase independence with toileting to SBA 5  Pt  will compelte UE HEP independently post education to facilitate functional use during ADLS    Plan   Treatment Interventions ADL retraining;UE strengthening/ROM   Goal Expiration Date 07/18/18   OT Frequency 3-5x/wk   Recommendation   OT Discharge Recommendation Short Term Rehab   Barthel Index   Feeding 5   Bathing 0   Grooming Score 0   Dressing Score 0   Bladder Score 0   Bowels Score 5   Toilet Use Score 5   Transfers (Bed/Chair) Score 5   Mobility (Level Surface) Score 0   Stairs Score 0   Barthel Index Score 20     Kaylah Poe MS, OTR/L

## 2018-07-05 LAB
ANION GAP SERPL CALCULATED.3IONS-SCNC: 7 MMOL/L (ref 4–13)
BUN SERPL-MCNC: 90 MG/DL (ref 5–25)
CALCIUM SERPL-MCNC: 8.3 MG/DL (ref 8.3–10.1)
CHLORIDE SERPL-SCNC: 102 MMOL/L (ref 100–108)
CO2 SERPL-SCNC: 31 MMOL/L (ref 21–32)
CREAT SERPL-MCNC: 2.08 MG/DL (ref 0.6–1.3)
GFR SERPL CREATININE-BSD FRML MDRD: 27 ML/MIN/1.73SQ M
GLUCOSE SERPL-MCNC: 162 MG/DL (ref 65–140)
GLUCOSE SERPL-MCNC: 251 MG/DL (ref 65–140)
GLUCOSE SERPL-MCNC: 253 MG/DL (ref 65–140)
GLUCOSE SERPL-MCNC: 275 MG/DL (ref 65–140)
HCT VFR BLD AUTO: 24.8 % (ref 36.5–49.3)
HGB BLD-MCNC: 7.5 G/DL (ref 12–17)
POTASSIUM SERPL-SCNC: 4.1 MMOL/L (ref 3.5–5.3)
SODIUM SERPL-SCNC: 140 MMOL/L (ref 136–145)

## 2018-07-05 PROCEDURE — 97112 NEUROMUSCULAR REEDUCATION: CPT

## 2018-07-05 PROCEDURE — 80048 BASIC METABOLIC PNL TOTAL CA: CPT | Performed by: INTERNAL MEDICINE

## 2018-07-05 PROCEDURE — 99232 SBSQ HOSP IP/OBS MODERATE 35: CPT | Performed by: INTERNAL MEDICINE

## 2018-07-05 PROCEDURE — 94760 N-INVAS EAR/PLS OXIMETRY 1: CPT

## 2018-07-05 PROCEDURE — 87186 SC STD MICRODIL/AGAR DIL: CPT | Performed by: INTERNAL MEDICINE

## 2018-07-05 PROCEDURE — 87070 CULTURE OTHR SPECIMN AEROBIC: CPT | Performed by: INTERNAL MEDICINE

## 2018-07-05 PROCEDURE — 87205 SMEAR GRAM STAIN: CPT | Performed by: INTERNAL MEDICINE

## 2018-07-05 PROCEDURE — 85014 HEMATOCRIT: CPT | Performed by: INTERNAL MEDICINE

## 2018-07-05 PROCEDURE — 99233 SBSQ HOSP IP/OBS HIGH 50: CPT | Performed by: INTERNAL MEDICINE

## 2018-07-05 PROCEDURE — 82948 REAGENT STRIP/BLOOD GLUCOSE: CPT

## 2018-07-05 PROCEDURE — 87077 CULTURE AEROBIC IDENTIFY: CPT | Performed by: INTERNAL MEDICINE

## 2018-07-05 PROCEDURE — 97110 THERAPEUTIC EXERCISES: CPT

## 2018-07-05 PROCEDURE — 94640 AIRWAY INHALATION TREATMENT: CPT

## 2018-07-05 PROCEDURE — 85018 HEMOGLOBIN: CPT | Performed by: INTERNAL MEDICINE

## 2018-07-05 RX ORDER — INSULIN GLARGINE 100 [IU]/ML
12 INJECTION, SOLUTION SUBCUTANEOUS
Status: DISCONTINUED | OUTPATIENT
Start: 2018-07-05 | End: 2018-07-06

## 2018-07-05 RX ORDER — PREDNISONE 1 MG/1
5 TABLET ORAL
Status: COMPLETED | OUTPATIENT
Start: 2018-07-06 | End: 2018-07-10

## 2018-07-05 RX ADMIN — IPRATROPIUM BROMIDE AND ALBUTEROL SULFATE 3 ML: 2.5; .5 SOLUTION RESPIRATORY (INHALATION) at 07:35

## 2018-07-05 RX ADMIN — INSULIN LISPRO 3 UNITS: 100 INJECTION, SOLUTION INTRAVENOUS; SUBCUTANEOUS at 11:29

## 2018-07-05 RX ADMIN — FUROSEMIDE 20 MG: 10 INJECTION, SOLUTION INTRAVENOUS at 17:33

## 2018-07-05 RX ADMIN — INSULIN LISPRO 3 UNITS: 100 INJECTION, SOLUTION INTRAVENOUS; SUBCUTANEOUS at 17:32

## 2018-07-05 RX ADMIN — PREDNISONE 10 MG: 10 TABLET ORAL at 08:12

## 2018-07-05 RX ADMIN — PRAVASTATIN SODIUM 40 MG: 40 TABLET ORAL at 17:33

## 2018-07-05 RX ADMIN — BUDESONIDE 0.5 MG: 0.5 INHALANT RESPIRATORY (INHALATION) at 07:35

## 2018-07-05 RX ADMIN — INSULIN LISPRO 4 UNITS: 100 INJECTION, SOLUTION INTRAVENOUS; SUBCUTANEOUS at 17:33

## 2018-07-05 RX ADMIN — FUROSEMIDE 20 MG: 10 INJECTION, SOLUTION INTRAVENOUS at 06:23

## 2018-07-05 RX ADMIN — INSULIN LISPRO 2 UNITS: 100 INJECTION, SOLUTION INTRAVENOUS; SUBCUTANEOUS at 21:16

## 2018-07-05 RX ADMIN — INSULIN LISPRO 1 UNITS: 100 INJECTION, SOLUTION INTRAVENOUS; SUBCUTANEOUS at 08:12

## 2018-07-05 RX ADMIN — INSULIN LISPRO 3 UNITS: 100 INJECTION, SOLUTION INTRAVENOUS; SUBCUTANEOUS at 08:13

## 2018-07-05 RX ADMIN — METOPROLOL SUCCINATE 25 MG: 25 TABLET, EXTENDED RELEASE ORAL at 08:12

## 2018-07-05 RX ADMIN — PANTOPRAZOLE SODIUM 40 MG: 40 TABLET, DELAYED RELEASE ORAL at 06:23

## 2018-07-05 RX ADMIN — IPRATROPIUM BROMIDE AND ALBUTEROL SULFATE 3 ML: 2.5; .5 SOLUTION RESPIRATORY (INHALATION) at 13:16

## 2018-07-05 RX ADMIN — LATANOPROST 1 DROP: 50 SOLUTION OPHTHALMIC at 21:16

## 2018-07-05 RX ADMIN — IPRATROPIUM BROMIDE AND ALBUTEROL SULFATE 3 ML: 2.5; .5 SOLUTION RESPIRATORY (INHALATION) at 20:10

## 2018-07-05 RX ADMIN — FUROSEMIDE 20 MG: 10 INJECTION, SOLUTION INTRAVENOUS at 11:14

## 2018-07-05 RX ADMIN — INSULIN GLARGINE 12 UNITS: 100 INJECTION, SOLUTION SUBCUTANEOUS at 21:15

## 2018-07-05 RX ADMIN — INSULIN LISPRO 3 UNITS: 100 INJECTION, SOLUTION INTRAVENOUS; SUBCUTANEOUS at 11:28

## 2018-07-05 NOTE — ASSESSMENT & PLAN NOTE
Hemoglobin dropped to 7 5 this a m  may need additional unit of blood if not improving given his overall frail state and cardiac issues

## 2018-07-05 NOTE — ASSESSMENT & PLAN NOTE
No results found for: HGBA1C    Recent Labs      07/04/18   0551  07/04/18   1142  07/04/18   1610  07/04/18 2057   POCGLU  196*  235*  278*  289*       Blood Sugar Average: Last 72 hrs:  (P) 503 [id number]     Elevated glucose will increase his Lantus dose and coverage

## 2018-07-05 NOTE — ASSESSMENT & PLAN NOTE
Patient's hypoxia, shortness of breath is improving-will transfer to [facility] floor and switch to oral Lasix tomorrow if he continues to improve Will start patient on Lasix 20 mg IV t i d  blood pressure permitting    With acute kidney injury is not a candidate for Ace or arbs  Discussed with Dr Ash Lechuga, Pulmonary

## 2018-07-05 NOTE — PROGRESS NOTES
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  Does not follow with prior nephrologist  Likely diabetic nephropathy and hypertensive sclerosis contributing    -Will need OP follow up       3  BPH with urinary retention:   -Elmore catheter with urinary retention protocol    -On flomax      4  CHF/volume status: Per documentation, ?18 lb weight gain since admission  Above BL weight which is about 190 lbs  -Daily weights, strict I/O  -Echo: EF=30-35%  -Chest X-ray: mild pulmonary vascular congestive changes, left > right pleural effusions, possible atelectasis vs pneumonia    -On Lasix 20 mg TID, will continue to assess volume status, if weights decreasing, creat  Improving, UOP good, will change to PO in the next 24-48 hours    -Fluid restriction 1500cc/day       5  HTN: blood pressure is low for age recommendations  Goal 's    -continue metoprolol with hold parameters       6  Anemia: S/P fall with flank hematoma as well as reported dark stools  Received 1 U PRBC's   Hgb remains stable  Continue to monitor and transfuse as needed  -EGD: moderate-severe gastritis  -GI following       Other: diabetes, HLD, COPD      SUBJECTIVE:  Patient is feeling okay today  He is sitting up in the chair  His son is at the bedside  He denies any chest pain, increased SOB, N/V/D  A elmore catheter remains in place with clear yellow urine   He is eating and drinking well       OBJECTIVE:  Current Weight: Weight - Scale: 90 2 kg (198 lb 13 7 oz)  Vitals:    07/05/18 0600 07/05/18 0736 07/05/18 0755 07/05/18 0812   BP:  136/64     BP Location:  Left arm     Pulse:  75  90   Resp:  20     Temp:  98 8 °F (37 1 °C)     TempSrc:  Tympanic     SpO2:  99% 94%    Weight: 90 6 kg (199 lb 11 8 oz) 90 2 kg (198 lb 13 7 oz)     Height:           Intake/Output Summary (Last 24 hours) at 07/05/18 0956  Last data filed at 07/05/18 6956   Gross per 24 hour   Intake             1270 ml   Output             2625 ml   Net            -1355 ml     General: No apparent distress  Skin: warm, dry, intact, no rash  HEENT: Moist mucous membranes, sclera anicteric, normocephalic  Neck: No apparent JVD appreciated  Chest: Lung sounds decreased B/L, on RA  Heart: Regular rate and rhythm, No murmer  Abdomen: round, firm, +BS  Extremities: traceB/L LE edema present  Neuro: Alert and oriented  Psych: Appropriate mood and affect    Medications:    Current Facility-Administered Medications:     acetaminophen (TYLENOL) tablet 650 mg, 650 mg, Oral, Q6H PRN, Esther Cruz MD, 650 mg at 07/01/18 2016    albuterol inhalation solution 2 5 mg, 2 5 mg, Nebulization, Q6H PRN, Esther Cruz MD, 2 5 mg at 07/01/18 1954    budesonide (PULMICORT) inhalation solution 0 5 mg, 0 5 mg, Nebulization, BID, Esther Cruz MD, 0 5 mg at 07/05/18 0735    furosemide (LASIX) injection 20 mg, 20 mg, Intravenous, TID (diuretic), Maritza Vega MD, 20 mg at 07/05/18 0623    HYDROcodone-acetaminophen (NORCO) 5-325 mg per tablet 1 tablet, 1 tablet, Oral, Q6H PRN, Esther Cruz MD, 1 tablet at 07/03/18 1120    insulin glargine (LANTUS) subcutaneous injection 12 Units 0 12 mL, 12 Units, Subcutaneous, HS, Mary Kate Fly, DO    insulin lispro (HumaLOG) 100 units/mL subcutaneous injection 1-5 Units, 1-5 Units, Subcutaneous, HS, ROMAINE Dillon, 3 Units at 07/04/18 1694    insulin lispro (HumaLOG) 100 units/mL subcutaneous injection 1-6 Units, 1-6 Units, Subcutaneous, TID AC, 1 Units at 07/05/18 0812 **AND** Fingerstick Glucose (POCT), , , TID AC, Neha Huang MD    insulin lispro (HumaLOG) 100 units/mL subcutaneous injection 3 Units, 3 Units, Subcutaneous, TID With Meals, Ho Stuart MD, 3 Units at 07/05/18 0813    ipratropium-albuterol (DUO-NEB) 0 5-2 5 mg/3 mL inhalation solution 3 mL, 3 mL, Nebulization, TID, Robyn King DO, 3 mL at 07/05/18 0735    latanoprost (XALATAN) 0 005 % ophthalmic solution 1 drop, 1 drop, Both Eyes, HS, Neha Huang MD, 1 drop at 07/04/18 2222    menthol-methyl salicylate (BENGAY) 48-05 % cream, , Apply externally, 4x Daily PRN, ROMAINE Landon, 1 application at 00/20/70 2008    metoprolol succinate (TOPROL-XL) 24 hr tablet 25 mg, 25 mg, Oral, Daily, ROMAINE Yan, 25 mg at 07/05/18 0410    ondansetron (ZOFRAN) injection 4 mg, 4 mg, Intravenous, Q6H PRN, Neha Huang MD    ondansetron (ZOFRAN) injection 4 mg, 4 mg, Intravenous, Once PRN, Ramy Silva DO    pantoprazole (PROTONIX) EC tablet 40 mg, 40 mg, Oral, Early Morning, Jose Adamson MD, 40 mg at 07/05/18 1663    pravastatin (PRAVACHOL) tablet 40 mg, 40 mg, Oral, Daily With Willard Kilpartick MD, 40 mg at 07/04/18 1708    [START ON 7/6/2018] predniSONE tablet 5 mg, 5 mg, Oral, Daily With Breakfast, Robyn King DO    traMADol Marzella [place]) tablet 50 mg, 50 mg, Oral, Q6H PRN, Samra Chicas, DO, 50 mg at 07/03/18 1735    Laboratory Results:    Results from last 7 days  Lab Units 07/05/18  0609 07/04/18  0432 07/03/18  0722 07/03/18  0459 07/02/18  0503 07/02/18  0502 07/02/18  0032 07/01/18  1923 07/01/18  1237 07/01/18  0744   WBC Thousand/uL  --  11 05*  --  11 06*  --  9 58  --   --   --  15 45*   HEMOGLOBIN g/dL 7 5* 7 8*  --  8 0*  --  7 6* 7 8* 7 8* 7 4* 7 5*   HEMATOCRIT % 24 8* 26 1*  --  26 3*  --  24 4* 24 9* 24 9* 24 4* 25 6*   PLATELETS Thousands/uL  --  207  --  206  --  216 --   --   --  286   SODIUM mmol/L 140 141 142  --  142  --   --  139  --  138   POTASSIUM mmol/L 4 1 4 3 3 8  --  3 9  --   --  4 2  --  4 8   CHLORIDE mmol/L 102 103 104  --  102  --   --  100  --  95*   CO2 mmol/L 31 32 33*  --  32  --   --  33*  --  31   BUN mg/dL 90* 86* 93*  --  99*  --   --  103*  --  103*   CREATININE mg/dL 2 08* 2 21* 2 54*  --  2 56*  --   --  2 52*  --  2 81*   CALCIUM mg/dL 8 3 7 9* 7 9*  --  7 8*  --   --  8 2*  --  8 0*   MAGNESIUM mg/dL  --  2 5  --   --  2 3  --   --   --   --   --    PHOSPHORUS mg/dL  --   --   --   --  4 4*  --   --   --   --   --    TOTAL PROTEIN g/dL  --   --   --   --  6 2*  --   --   --   --  7 4   GLUCOSE RANDOM mg/dL 162* 201* 159*  --  135  --   --  208*  --  342*

## 2018-07-05 NOTE — PROGRESS NOTES
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  · Will taper prednisone to 5 mg daily for 3 days and then stop  Per the family this was started last week in his outpatient Pulmonary office  · Pulmicort b i d   · DuoNeb t i d      3  Acute/chronic hypoxic respiratory failure - multifactorial  · Wean supplemental oxygen for sats greater than 88%    Will sign off from a pulmonary standpoint, please call with questions    Chief Complaint:  Still with mild dyspnea    Subjective: The patient reports that he continues to have some mild shortness of breath particularly with exertion out of the chair  He denies any significant cough, wheezing or chest pain  He is afebrile  Vitals: Blood pressure 136/64, pulse 90, temperature 98 8 °F (37 1 °C), temperature source Tympanic, resp  rate 20, height 5' 6" (1 676 m), weight 90 2 kg (198 lb 13 7 oz), SpO2 99 %  , 3 L nasal cannula, Body mass index is 32 1 kg/m²        Intake/Output Summary (Last 24 hours) at 07/05/18 0858  Last data filed at 07/05/18 8707   Gross per 24 hour   Intake             1750 ml   Output             2825 ml   Net            -1075 ml       Physical exam:  General:  Patient is awake, alert, non-toxic and in no acute respiratory distress  Neck: No JVD  CV:  Regular, +S1 and S2, DEEP, gallops or rubs appreciated  Lungs:  Decreased breath sounds bilateral with bilateral rales left greater than right  Abdomen: Soft, +BS, Non-tender, non-distended  Extremities:  Lower extremity edema, No clubbing, cyanosis   Neuro: No focal deficits    Labs:   BNP: No results found for: BNP, CBC:   Lab Results   Component Value Date    HGB 7 5 (L) 07/05/2018    HCT 24 8 (L) 07/05/2018   , CMP:   Lab Results   Component Value Date     07/05/2018    K 4 1 07/05/2018     07/05/2018    CO2 31 07/05/2018    ANIONGAP 7 07/05/2018    BUN 90 (H) 07/05/2018    CREATININE 2 08 (H) 07/05/2018    GLUCOSE 162 (H) 07/05/2018    CALCIUM 8 3 07/05/2018    EGFR 27 07/05/2018       Alexis [place], DO      Portions of the record may have been created with voice recognition software  Occasional wrong word or "sound a like" substitutions may have occurred due to the inherent limitations of voice recognition software  Read the chart carefully and recognize, using context, where substitutions have occurred

## 2018-07-05 NOTE — PROGRESS NOTES
Elke Vigil  [id number]    6 y o   male      ASSESSMENT  1   Symptomatic anemia with melena   EGD 7/2 showed erosive gastritis and a small hiatal hernia   No overt bleeding site identified  Hemoglobin remains stable   2   Acute anemia probably acute blood loss anemia  Hemoglobin dropped from 7 8-7 5 g today  3   GERD on Pepcid  4   Distant history of iron deficiency anemia and occult GI bleeding   S/P EGD/colonoscopy/capsule endoscopy 2007   Treated with a year course of oral iron  5   Distant history of peptic ulcer disease  6   Distant history of colon cancer  7   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  with congestive heart failure followed by Dr Sylvain Peña  8   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  9   H/O AAA  10   DM    PLAN  1  Follow H&H  2   Watch for overt GI blood loss  3  Continue Protonix  4  A colonoscopy to be considered if hemoglobin drops further  Chief Complaint   Patient presents with    Fall     Patient sent in from [place] [address] after falling out of his wheelchair today  Staff states this is the 3rd time he has done this in 2 days  Patietn initially complaisned of back pain but none upon arrival       [address] [place] [address] without difficulty    Denies any abdominal pain, nausea, vomiting, melena rectal bleeding    /64 (BP Location: Left arm)   Pulse 90   Temp 98 8 °F (37 1 °C) (Tympanic)   Resp 20   Ht 5' 6" (1 676 m)   Wt 90 2 kg (198 lb 13 7 oz)   SpO2 94%   BMI 32 10 kg/m²       PHYSICALEXAM  Constitutional:   no acute distress   Eyes:   conjunctiva normal   HENT:  Atraumatic  Respiratory:  No respiratory distress   Cardiovascular:  Normal rate  GI:  Soft, nondistended, normal bowel sounds, nontender  Musculoskeletal:  No edema  Neurologic:  Alert & oriented x 3      Lab Results   Component Value Date    GLUCOSE 162 (H) 07/05/2018    CALCIUM 8 3 07/05/2018     07/05/2018    K 4 1 07/05/2018    CO2 31 07/05/2018     07/05/2018    BUN 90 (H) 07/05/2018 CREATININE 2 08 (H) 07/05/2018     Lab Results   Component Value Date    WBC 11 05 (H) 07/04/2018    HGB 7 5 (L) 07/05/2018    HCT 24 8 (L) 07/05/2018    MCV 92 07/04/2018     07/04/2018     Lab Results   Component Value Date    ALT 20 07/02/2018    AST 22 07/02/2018    ALKPHOS 53 07/02/2018    BILITOT 0 80 07/02/2018     No results found for: AMYLASE  No results found for: LIPASE  Lab Results   Component Value Date    IRON 18 (L) 07/02/2018    TIBC 423 07/02/2018    FERRITIN 47 07/02/2018     Lab Results   Component Value Date    INR 1 17 07/01/2018       Counseling / Coordination of Care  Total floor / unit time spent today 25 minutes  Greater than 50% of total time was spent with the patient and / or family counseling and / or coordination of care  A description of the counseling / coordination of care: 15    Sylvie Morales

## 2018-07-05 NOTE — PHYSICAL THERAPY NOTE
PHYSICAL THERAPY NOTE       07/05/18 1455   Pain Assessment   Pain Assessment No/denies pain   Restrictions/Precautions   Other Precautions Multiple lines; Fall Risk;O2   General   Chart Reviewed Yes   Response to Previous Treatment Patient with no complaints from previous session  Family/Caregiver Present Yes   Cognition   Overall Cognitive Status Impaired   Arousal/Participation Lethargic   Attention Attends with cues to redirect   Orientation Level Oriented to place;Oriented to person;Oriented to situation   Subjective   Subjective Reports feeling good sitting in chair   Transfers   Sit to Stand 4  Minimal assistance   Additional items Assist x 1; Armrests; Verbal cues; Increased time required   Stand to Sit 4  Minimal assistance   Additional items Assist x 1; Armrests; Verbal cues; Increased time required   Additional Comments 2 sit to stand transfers performed  Stood for approx 3 minutes on last transfer with min a for cg   Ambulation/Elevation   Gait pattern (patient declined gait trial due to fatigue)   Balance   Static Sitting Fair -   Dynamic Sitting Fair -   Static Standing Fair -   Dynamic Standing Poor +   Endurance Deficit   Endurance Deficit Yes   Endurance Deficit Description fatigue   Activity Tolerance   Activity Tolerance Patient limited by fatigue   Nurse Made Aware RN Jimena   Exercises   Hip Flexion Sitting;10 reps;Bilateral;AROM   Knee AROM Long Arc Quad Sitting;10 reps;AROM; Bilateral   Ankle Pumps Sitting;10 reps;AROM; Bilateral   Marching Standing;10 reps;AROM; Bilateral   Assessment   Prognosis Fair   Problem List Decreased strength;Decreased endurance; Impaired balance;Decreased mobility; Decreased cognition;Pain   Assessment Patient tolerated session well and made good progress during session  He was able to perform 2 transfers at a min A level  He needed verbal cues for technique   He also participated in seated and standing there-ex  He did fatigue and needed rest breaks throughout  He will continue to benefit from P T  And will need rehab at discharge  Goals   Patient Goals "To get stronger"   STG Expiration Date 07/16/18   Treatment Day 2   Plan   Treatment/Interventions Functional transfer training;LE strengthening/ROM; Therapeutic exercise; Endurance training;Cognitive reorientation; Bed mobility;Gait training;Spoke to nursing;OT   Progress Slow progress, decreased activity tolerance   PT Frequency Other (Comment)  (3-5x/wk)   Recommendation   Recommendation Post acute IP rehab   Equipment Recommended Brittany Regalado, PT            Patient Name: Sara Saini  [id number] Date: 7/5/2018

## 2018-07-05 NOTE — ASSESSMENT & PLAN NOTE
Continue oxygen via nasal cannula-will check sats with ambulation transfer to [facility] floor and increase activity

## 2018-07-05 NOTE — PROGRESS NOTES
Report given to Howard Mcghee RN, med/surg  Family updated on room change to 202A  Patient taken via recliner chair  Personal belongings taken with  Offered no complaints

## 2018-07-05 NOTE — PLAN OF CARE
Problem: PHYSICAL THERAPY ADULT  Goal: Performs mobility at highest level of function for planned discharge setting  See evaluation for individualized goals  Treatment/Interventions: Functional transfer training, LE strengthening/ROM, Therapeutic exercise, Endurance training, Bed mobility, Gait training, Spoke to nursing, OT  Equipment Recommended: Michela Regalado PT         See flowsheet documentation for full assessment, interventions and recommendations  Outcome: Progressing  Prognosis: Fair  Problem List: Decreased strength, Decreased endurance, Impaired balance, Decreased mobility, Decreased cognition, Pain  Assessment: Patient tolerated session well and made good progress during session  He was able to perform 2 transfers at a min A level  He needed verbal cues for technique  He also participated in seated and standing there-ex  He did fatigue and needed rest breaks throughout  He will continue to benefit from P T  And will need rehab at discharge  Recommendation: Post acute IP rehab          See flowsheet documentation for full assessment

## 2018-07-06 ENCOUNTER — APPOINTMENT (INPATIENT)
Dept: RADIOLOGY | Facility: HOSPITAL | Age: 83
DRG: 377 | End: 2018-07-06
Payer: COMMERCIAL

## 2018-07-06 LAB
ALBUMIN SERPL BCP-MCNC: 2.8 G/DL (ref 3.5–5)
ALP SERPL-CCNC: 63 U/L (ref 46–116)
ALT SERPL W P-5'-P-CCNC: 23 U/L (ref 12–78)
ANION GAP SERPL CALCULATED.3IONS-SCNC: 9 MMOL/L (ref 4–13)
AST SERPL W P-5'-P-CCNC: 25 U/L (ref 5–45)
BILIRUB SERPL-MCNC: 0.4 MG/DL (ref 0.2–1)
BUN SERPL-MCNC: 90 MG/DL (ref 5–25)
CALCIUM SERPL-MCNC: 8.4 MG/DL (ref 8.3–10.1)
CHLORIDE SERPL-SCNC: 102 MMOL/L (ref 100–108)
CO2 SERPL-SCNC: 30 MMOL/L (ref 21–32)
CREAT SERPL-MCNC: 2.25 MG/DL (ref 0.6–1.3)
ERYTHROCYTE [DISTWIDTH] IN BLOOD BY AUTOMATED COUNT: 15.2 % (ref 11.6–15.1)
GFR SERPL CREATININE-BSD FRML MDRD: 25 ML/MIN/1.73SQ M
GLUCOSE SERPL-MCNC: 142 MG/DL (ref 65–140)
GLUCOSE SERPL-MCNC: 173 MG/DL (ref 65–140)
GLUCOSE SERPL-MCNC: 217 MG/DL (ref 65–140)
GLUCOSE SERPL-MCNC: 220 MG/DL (ref 65–140)
GLUCOSE SERPL-MCNC: 253 MG/DL (ref 65–140)
HCT VFR BLD AUTO: 25.3 % (ref 36.5–49.3)
HGB BLD-MCNC: 7.7 G/DL (ref 12–17)
MCH RBC QN AUTO: 27.6 PG (ref 26.8–34.3)
MCHC RBC AUTO-ENTMCNC: 30.4 G/DL (ref 31.4–37.4)
MCV RBC AUTO: 91 FL (ref 82–98)
PLATELET # BLD AUTO: 203 THOUSANDS/UL (ref 149–390)
PMV BLD AUTO: 10.6 FL (ref 8.9–12.7)
POTASSIUM SERPL-SCNC: 3.9 MMOL/L (ref 3.5–5.3)
PROT SERPL-MCNC: 6.7 G/DL (ref 6.4–8.2)
RBC # BLD AUTO: 2.79 MILLION/UL (ref 3.88–5.62)
SODIUM SERPL-SCNC: 141 MMOL/L (ref 136–145)
WBC # BLD AUTO: 9.78 THOUSAND/UL (ref 4.31–10.16)

## 2018-07-06 PROCEDURE — 97530 THERAPEUTIC ACTIVITIES: CPT

## 2018-07-06 PROCEDURE — 99232 SBSQ HOSP IP/OBS MODERATE 35: CPT | Performed by: INTERNAL MEDICINE

## 2018-07-06 PROCEDURE — 82948 REAGENT STRIP/BLOOD GLUCOSE: CPT

## 2018-07-06 PROCEDURE — 94760 N-INVAS EAR/PLS OXIMETRY 1: CPT

## 2018-07-06 PROCEDURE — 80053 COMPREHEN METABOLIC PANEL: CPT | Performed by: NURSE PRACTITIONER

## 2018-07-06 PROCEDURE — 85027 COMPLETE CBC AUTOMATED: CPT | Performed by: NURSE PRACTITIONER

## 2018-07-06 PROCEDURE — 97110 THERAPEUTIC EXERCISES: CPT

## 2018-07-06 PROCEDURE — 71046 X-RAY EXAM CHEST 2 VIEWS: CPT

## 2018-07-06 PROCEDURE — 94640 AIRWAY INHALATION TREATMENT: CPT

## 2018-07-06 RX ORDER — POLYETHYLENE GLYCOL 3350 17 G/17G
17 POWDER, FOR SOLUTION ORAL 2 TIMES DAILY PRN
Status: DISCONTINUED | OUTPATIENT
Start: 2018-07-06 | End: 2018-07-10

## 2018-07-06 RX ORDER — INSULIN GLARGINE 100 [IU]/ML
14 INJECTION, SOLUTION SUBCUTANEOUS
Status: DISCONTINUED | OUTPATIENT
Start: 2018-07-06 | End: 2018-07-07

## 2018-07-06 RX ORDER — METOPROLOL SUCCINATE 25 MG/1
12.5 TABLET, EXTENDED RELEASE ORAL DAILY
Status: DISCONTINUED | OUTPATIENT
Start: 2018-07-06 | End: 2018-07-13 | Stop reason: HOSPADM

## 2018-07-06 RX ORDER — FERROUS SULFATE 325(65) MG
325 TABLET ORAL
Status: DISCONTINUED | OUTPATIENT
Start: 2018-07-06 | End: 2018-07-13 | Stop reason: HOSPADM

## 2018-07-06 RX ADMIN — IPRATROPIUM BROMIDE AND ALBUTEROL SULFATE 3 ML: 2.5; .5 SOLUTION RESPIRATORY (INHALATION) at 20:53

## 2018-07-06 RX ADMIN — ZINC 1 TABLET: TAB ORAL at 09:08

## 2018-07-06 RX ADMIN — PREDNISONE 5 MG: 5 TABLET ORAL at 09:08

## 2018-07-06 RX ADMIN — FERROUS SULFATE TAB 325 MG (65 MG ELEMENTAL FE) 325 MG: 325 (65 FE) TAB at 09:08

## 2018-07-06 RX ADMIN — INSULIN LISPRO 2 UNITS: 100 INJECTION, SOLUTION INTRAVENOUS; SUBCUTANEOUS at 21:14

## 2018-07-06 RX ADMIN — IPRATROPIUM BROMIDE AND ALBUTEROL SULFATE 3 ML: 2.5; .5 SOLUTION RESPIRATORY (INHALATION) at 15:12

## 2018-07-06 RX ADMIN — FUROSEMIDE 20 MG: 10 INJECTION, SOLUTION INTRAVENOUS at 05:35

## 2018-07-06 RX ADMIN — INSULIN LISPRO 3 UNITS: 100 INJECTION, SOLUTION INTRAVENOUS; SUBCUTANEOUS at 18:04

## 2018-07-06 RX ADMIN — INSULIN LISPRO 3 UNITS: 100 INJECTION, SOLUTION INTRAVENOUS; SUBCUTANEOUS at 11:43

## 2018-07-06 RX ADMIN — PANTOPRAZOLE SODIUM 40 MG: 40 TABLET, DELAYED RELEASE ORAL at 05:35

## 2018-07-06 RX ADMIN — BUDESONIDE 0.5 MG: 0.5 INHALANT RESPIRATORY (INHALATION) at 09:39

## 2018-07-06 RX ADMIN — INSULIN GLARGINE 14 UNITS: 100 INJECTION, SOLUTION SUBCUTANEOUS at 21:13

## 2018-07-06 RX ADMIN — INSULIN LISPRO 3 UNITS: 100 INJECTION, SOLUTION INTRAVENOUS; SUBCUTANEOUS at 09:09

## 2018-07-06 RX ADMIN — IPRATROPIUM BROMIDE AND ALBUTEROL SULFATE 3 ML: 2.5; .5 SOLUTION RESPIRATORY (INHALATION) at 09:39

## 2018-07-06 RX ADMIN — BUDESONIDE 0.5 MG: 0.5 INHALANT RESPIRATORY (INHALATION) at 20:53

## 2018-07-06 RX ADMIN — LATANOPROST 1 DROP: 50 SOLUTION OPHTHALMIC at 21:18

## 2018-07-06 RX ADMIN — INSULIN LISPRO 2 UNITS: 100 INJECTION, SOLUTION INTRAVENOUS; SUBCUTANEOUS at 18:05

## 2018-07-06 RX ADMIN — PRAVASTATIN SODIUM 40 MG: 40 TABLET ORAL at 18:04

## 2018-07-06 RX ADMIN — INSULIN LISPRO 3 UNITS: 100 INJECTION, SOLUTION INTRAVENOUS; SUBCUTANEOUS at 11:42

## 2018-07-06 RX ADMIN — INSULIN LISPRO 1 UNITS: 100 INJECTION, SOLUTION INTRAVENOUS; SUBCUTANEOUS at 09:09

## 2018-07-06 NOTE — PROGRESS NOTES
NEPHROLOGY PROGRESS NOTE   Jackie Miller 80 y o  male MRN: [id number]  Unit/Bed#: [address] [place] [address] 202-01 Encounter: [id number]  Reason for Consult:  DINO (POA) on CKD    ASSESSMENT/PLAN:  1  DINO (POA): pre-renal ATN vs post-obstructive with urinary retention, and now cardiorenal component    -Creat  Initially improved with IVF and elmore  IVF discontinued for volume overload, started on diuretics  Now with worsening creat  Diuretics on hold  No apparent distress  Will reassess tomorrow    -Renal US: no hydronephrosis, tiny cyst    -UA: 85392/10) glucose, trace blood, trace protein, 4-10 RBC's, 0-1 WBC's and hyaline casts    -good urine OP  (-2 5 L since admission)  -continue to hold aldactone and metolazone        2  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  Does not follow with prior nephrologist  Likely diabetic nephropathy and hypertensive sclerosis contributing    -Will need OP follow up       3  BPH with urinary retention:   -Elmore catheter discontinued    -On flomax   -Check PVR q shift     4  CHF/volume status: Per documentation, ?18 lb weight gain since admission  Now slightly above BL weight which is about 190 lbs  -Daily weights, strict I/O  -Echo: EF=30-35%  -Chest X-ray: mild pulmonary vascular congestive changes, left > right pleural effusions, possible atelectasis vs pneumonia    -Was on Lasix 20 mg TID, currently on hold due to worsening creat  and hypotension  Reassess in am    -Fluid restriction 1500cc/day       5  HTN: now with hypotension, blood pressure is low for age recommendations  Goal 's    -metoprolol decreased to 12 5 mg, with hold parameters    -Consider albumin PRN for SBP   -May need bolus for SBP <90       6  Anemia: S/P fall with flank hematoma as well as reported dark stools  Received 1 U PRBC's   Hgb remains stable  Continue to monitor and transfuse as needed  -EGD: moderate-severe gastritis  -GI following   Considering colonoscopy if Hgb continues to drop    -continue PO iron supplementation  7  Constipation:   -Miralax added   -provided prune juice       Other: diabetes, HLD, COPD    SUBJECTIVE:  Patient is feeling okay this morning  He is currently on 2 L of supplemental oxygen via nasal cannula  He denies any chest pain, increased SOB,N/V/ or diarrhea  He has not had a bowel movement for several days  He is eating and drinking well  He feels as if his bladder is not emptying completely  OBJECTIVE:  Current Weight: Weight - Scale: 87 5 kg (192 lb 14 4 oz)  Vitals:    07/05/18 2243 07/06/18 0739 07/06/18 0900 07/06/18 0943   BP: 113/56 (!) 88/53     BP Location:  Left arm     Pulse: 67 73     Resp: 20 18     Temp: 98 5 °F (36 9 °C) 98 6 °F (37 °C)     TempSrc: Oral Oral     SpO2: 96% 94% 96% 96%   Weight:  87 5 kg (192 lb 14 4 oz)     Height:           Intake/Output Summary (Last 24 hours) at 07/06/18 1004  Last data filed at 07/06/18 0755   Gross per 24 hour   Intake              240 ml   Output             2945 ml   Net            -2705 ml     General: No apparent distress  Skin: warm, dry, intact, no rash  HEENT: Moist mucous membranes, sclera anicteric, normocephalic  Neck: No apparent JVD appreciated  Chest: Lung sounds clear B/L, on 2 L NC  Heart: Regular rate and rhythm, No murmer  Abdomen: Soft, round, NT, +BS, Firm, slightly distended     Extremities: trace B/L LE edema present  Neuro: Alert and oriented  Psych: Appropriate mood and affect    Medications:    Current Facility-Administered Medications:     acetaminophen (TYLENOL) tablet 650 mg, 650 mg, Oral, Q6H PRN, Teri Alvarado MD, 650 mg at 07/01/18 2016    albuterol inhalation solution 2 5 mg, 2 5 mg, Nebulization, Q6H PRN, Teri Alvarado MD, 2 5 mg at 07/01/18 1954    budesonide (PULMICORT) inhalation solution 0 5 mg, 0 5 mg, Nebulization, BID, Teri Alvarado MD, 0 5 mg at 07/06/18 6155    ferrous sulfate tablet 325 mg, 325 mg, Oral, Daily With Breakfast, Mary Kate Fly, DO, 325 mg at 07/06/18 0908   HYDROcodone-acetaminophen (NORCO) 5-325 mg per tablet 1 tablet, 1 tablet, Oral, Q6H PRN, Katia Galan MD, 1 tablet at 07/03/18 1120    insulin glargine (LANTUS) subcutaneous injection 14 Units 0 14 mL, 14 Units, Subcutaneous, HS, Mary Kate Joseph DO    insulin lispro (HumaLOG) 100 units/mL subcutaneous injection 1-5 Units, 1-5 Units, Subcutaneous, HS, ROMAINE Dillon, 2 Units at 07/05/18 2116    insulin lispro (HumaLOG) 100 units/mL subcutaneous injection 1-6 Units, 1-6 Units, Subcutaneous, TID AC, 1 Units at 07/06/18 0909 **AND** Fingerstick Glucose (POCT), , , TID AC, Katia Galan MD    insulin lispro (HumaLOG) 100 units/mL subcutaneous injection 3 Units, 3 Units, Subcutaneous, TID With Meals, Albina Borja MD, 3 Units at 07/06/18 0909    ipratropium-albuterol (DUO-NEB) 0 5-2 5 mg/3 mL inhalation solution 3 mL, 3 mL, Nebulization, TID, Jackie Lang DO, 3 mL at 07/06/18 0939    latanoprost (XALATAN) 0 005 % ophthalmic solution 1 drop, 1 drop, Both Eyes, HS, Katia Galan MD, 1 drop at 07/05/18 2116    menthol-methyl salicylate (BENGAY) 86-10 % cream, , Apply externally, 4x Daily PRN, ROMAINE Reese, 1 application at 88/70/94 2008    metoprolol succinate (TOPROL-XL) 24 hr tablet 12 5 mg, 12 5 mg, Oral, Daily, Kindra Almaguer,     multivitamin stress formula tablet 1 tablet, 1 tablet, Oral, Daily, Mary Kate Joseph DO, 1 tablet at 07/06/18 0908    ondansetron (ZOFRAN) injection 4 mg, 4 mg, Intravenous, Q6H PRN, Katia Galan MD    pantoprazole (PROTONIX) EC tablet 40 mg, 40 mg, Oral, Early Morning, Bennett Srinivasan MD, 40 mg at 07/06/18 0535    pravastatin (PRAVACHOL) tablet 40 mg, 40 mg, Oral, Daily With Karolina Mccrary MD, 40 mg at 07/05/18 1733    predniSONE tablet 5 mg, 5 mg, Oral, Daily With Breakfast, Jackie Lang DO, 5 mg at 07/06/18 0908    traMADol (ULTRAM) tablet 50 mg, 50 mg, Oral, Q6H PRN, Mary Kate Joseph DO, 50 mg at 07/03/18 1734    Laboratory Results:    Results from last 7 days  Lab Units 07/06/18  0205 07/05/18  8338 07/04/18  0432 07/03/18  0722 07/03/18  0459 07/02/18  0503 07/02/18  0502 07/02/18  0032 07/01/18  1923  07/01/18  0744   WBC Thousand/uL 9 78  --  11 05*  --  11 06*  --  9 58  --   --   --  15 45*   HEMOGLOBIN g/dL 7 7* 7 5* 7 8*  --  8 0*  --  7 6* 7 8* 7 8*  < > 7 5*   HEMATOCRIT % 25 3* 24 8* 26 1*  --  26 3*  --  24 4* 24 9* 24 9*  < > 25 6*   PLATELETS Thousands/uL 203  --  207  --  206  --  216  --   --   --  286   SODIUM mmol/L 141 140 141 142  --  142  --   --  139  --  138   POTASSIUM mmol/L 3 9 4 1 4 3 3 8  --  3 9  --   --  4 2  --  4 8   CHLORIDE mmol/L 102 102 103 104  --  102  --   --  100  --  95*   CO2 mmol/L 30 31 32 33*  --  32  --   --  33*  --  31   BUN mg/dL 90* 90* 86* 93*  --  99*  --   --  103*  --  103*   CREATININE mg/dL 2 25* 2 08* 2 21* 2 54*  --  2 56*  --   --  2 52*  --  2 81*   CALCIUM mg/dL 8 4 8 3 7 9* 7 9*  --  7 8*  --   --  8 2*  --  8 0*   MAGNESIUM mg/dL  --   --  2 5  --   --  2 3  --   --   --   --   --    PHOSPHORUS mg/dL  --   --   --   --   --  4 4*  --   --   --   --   --    TOTAL PROTEIN g/dL 6 7  --   --   --   --  6 2*  --   --   --   --  7 4   GLUCOSE RANDOM mg/dL 142* 162* 201* 159*  --  135  --   --  208*  --  342*   < > = values in this interval not displayed

## 2018-07-06 NOTE — OCCUPATIONAL THERAPY NOTE
Occupational Therapy Treatment Note      Jet Hartman    7/6/2018    Patient Active Problem List   Diagnosis    Acute kidney injury ([facility] )    Chronic kidney disease, stage 3    Type 2 diabetes mellitus with stage 3 chronic kidney disease, without long-term current use of insulin (HCC)    Acute on chronic systolic congestive heart failure ([facility] )    Acute blood loss anemia    Chronic respiratory failure with hypoxia ([facility] )    Hypertension    COPD (chronic obstructive pulmonary disease) ([facility] )    Contusion of flank    Hyperlipidemia    Cancer ([facility] )    ATN (acute tubular necrosis) (HCC)    Parenchymal renal hypertension    Benign prostatic hyperplasia with urinary retention    Acute pain of left shoulder    Acute erosive gastritis       Past Medical History:   Diagnosis Date    Aortic regurgitation     Atrial fibrillation (HCC)     Cancer (HCC)     colon    CHF (congestive heart failure) (HCC)     Complete heart block (HCC)     post CABG    COPD (chronic obstructive pulmonary disease) (HCC)     Coronary artery disease     MI    Diabetes mellitus ([facility] )     Dissecting aortic aneurysm (HCC)     Endocarditis     Hx of bleeding disorder     GI bleed on aspirin    Hypertension     Mitral regurgitation     Skin cancer        Past Surgical History:   Procedure Laterality Date    AORTIC VALVE REPLACEMENT      CARDIAC PACEMAKER PLACEMENT      [employer]     CARDIAC SURGERY      CABG on physician notes, none per pt   CHOLECYSTECTOMY      COLECTOMY      ESOPHAGOGASTRODUODENOSCOPY N/A 7/2/2018    Procedure: ESOPHAGOGASTRODUODENOSCOPY (EGD);   Surgeon: Rodrigo Suarez MD;  [facility];  Service: Gastroenterology    FLAP LOCAL HEAD / NECK N/A 5/19/2016    Procedure: VERSUS FLAP ;  Surgeon: Ramu Lenz MD;  [facility];  Service:     JOINT REPLACEMENT Left     hip    MITRAL VALVE REPLACEMENT      MOHS RECONSTRUCTION N/A 5/19/2016    Procedure: RECONSTRUCTION MOHS DEFECT NOSE AND SCALP;  Surgeon: Riccardo Golden MD;  [facility];  Service:     SKIN BIOPSY          07/06/18 1454   Restrictions/Precautions   Other Precautions Pain; Fall Risk;O2   Pain Assessment   Pain Assessment FLACC   Pain Type (Generalized Pain )   Pain Rating: FLACC (Rest) - Face 1   Pain Rating: FLACC (Rest) - Legs 0   Pain Rating: FLACC (Rest) - Activity 1   Pain Rating: FLACC (Rest) - Cry 0   Pain Rating: FLACC (Rest) - Consolability 0   Score: FLACC (Rest) 2   Pain Rating: FLACC (Activity) - Face 1   Pain Rating: FLACC (Activity) - Legs 1   Pain Rating: FLACC (Activity) - Activity 1   Pain Rating: FLACC (Activity) - Cry 1   Pain Rating: FLACC (Activity) - Consolability 1   Score: FLACC (Activity) 5   Bed Mobility   Sit to Supine 2  Maximal assistance   Additional items Assist x 2; Increased time required;LE management;Verbal cues   Transfers   Sit to Stand 3  Moderate assistance   Additional items Assist x 2   Stand to Sit 3  Moderate assistance   Additional items Assist x 2   Stand pivot 3  Moderate assistance   Additional items Assist x 2  (needs physical A to move RW and verbal cues for x-jerman tech )   Therapeutic Exercise - ROM   UE-ROM Yes   ROM- Right Upper Extremities   R Shoulder Flexion;ABduction;Horizontal ABduction;AROM  (1 set/5 reps B/L shoulder)   R Elbow Elbow flexion;Elbow extension;AAROM   R Wrist Wrist flexion;Wrist extension;AAROM   ROM - Left Upper Extremities    L Shoulder AAROM;ABduction; Flexion; Horizontal ABduction   L Elbow PROM;AAROM   L Wrist PROM;AAROM   Cognition   Overall Cognitive Status Impaired   Arousal/Participation Lethargic;Responsive  (keeping eyes closed most of session )   Attention Attends with cues to redirect   Orientation Level Oriented to person   Memory Decreased recall of recent events   Following Commands Follows one step commands with increased time or repetition   Activity Tolerance   Activity Tolerance Patient limited by fatigue  (Activity Tolerance: Poor )   Medical Staff Made Aware OT spoke with Lilia Mckeon RN and Juan PT    Assessment   Assessment Patient seen for OT session focusing on BUE PROM, cogntive re-orientation, transfers and bed mobility  Patient required A x 2 for transfer chair < bed, fatigued easily and anxiety noted today  Patient not fully oriented and needed encouragement to remain alert and and actively participate with OT        Barthel Index   Feeding 0   Bathing 0   Grooming Score 0   Dressing Score 5   Bladder Score 0   Bowels Score 0   Toilet Use Score 0   Transfers (Bed/Chair) Score 5   Mobility (Level Surface) Score 0   Stairs Score 0   Barthel Index Score 10   Modified [place] Scale   Modified [place] Scale 4   Rita Sharma, OT

## 2018-07-06 NOTE — PROGRESS NOTES
Sarah Lewis  MR # [id number]  Unit/Bed#: [address] 202[address]  80 y o   male      ASSESSMENT:  1   Symptomatic anemia with melena   EGD 7/2 showed erosive gastritis and a small hiatal hernia  + intestinal metaplasia of stomach  H P negative   No overt bleeding site identified   Hemoglobin remains stable   2   Acute anemia probably acute blood loss anemia  3   GERD on Pepcid  4   Distant history of iron deficiency anemia and occult GI bleeding   S/P EGD/colonoscopy/capsule endoscopy 2007   Treated with a year course of oral iron  5   Distant history of peptic ulcer disease  6   Distant history of colon cancer  7   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  with congestive heart failure followed by Dr Courtney Will  8   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  9   H/O AAA  10   DM  PLAN:  1  Follow H&H and transfuse as needed  2  Watch for overt GI blood loss  3  Continue Protonix  4  A colonoscopy to be considered if hemoglobin drops further- but hope to avoid this due to age and comorbidities  Chief Complaint   Patient presents with   Elwyn Serge Fall     Patient sent in from [place] [id number] after falling out of his wheelchair today  Staff states this is the 3rd time he has done this in 2 days  Patietn initially complaisned of back pain but none upon arrival       SUBJECTIVE/HP:  Patient awake in chair with O2 via NC  Family at bedside  He reports he is feeling well  Denies abdominal pain, nausea, vomiting  Appetite is good  He is unsure of when his last BM was     Breathing is "ok"  BP (!) 88/53 (BP Location: Left arm)   Pulse 73   Temp 98 6 °F (37 °C) (Oral)   Resp 18   Ht 5' 6" (1 676 m)   Wt 87 5 kg (192 lb 14 4 oz)   SpO2 94%   BMI 31 14 kg/m²     PHYSICAL  General appearance: alert, appears stated age and cooperative,    Head: Normocephalic, without obvious abnormality, atraumatic  Lungs: anterior wheeze  Heart: regular rate and rhythm, S1,S2, no murmur,gallop or rub  Abdomen: obese, soft, non-tender; bowel sounds normal; no masses,  no organomegaly  Extremities: +1-2 edema BL   Neurologic: awake and alert, nonfocal     Lab Results   Component Value Date    GLUCOSE 142 (H) 07/06/2018    CALCIUM 8 4 07/06/2018     07/06/2018    K 3 9 07/06/2018    CO2 30 07/06/2018     07/06/2018    BUN 90 (H) 07/06/2018    CREATININE 2 25 (H) 07/06/2018     Lab Results   Component Value Date    WBC 9 78 07/06/2018    HGB 7 7 (L) 07/06/2018    HCT 25 3 (L) 07/06/2018    MCV 91 07/06/2018     07/06/2018     Lab Results   Component Value Date    ALT 23 07/06/2018    AST 25 07/06/2018    ALKPHOS 63 07/06/2018    BILITOT 0 40 07/06/2018     No results found for: AMYLASE  No results found for: LIPASE  Lab Results   Component Value Date    IRON 18 (L) 07/02/2018    TIBC 423 07/02/2018    FERRITIN 47 07/02/2018     Lab Results   Component Value Date    INR 1 17 07/01/2018   Joseline Shaw  MR # [id number]  Unit/Bed#: [address] [place] [address] 202[address]01  80 y o   male

## 2018-07-06 NOTE — SOCIAL WORK
Continue to follow  [facility] has bed available for Pt if discharged this weekend  Spoke with Liudmila Carty at [facility] approval obtained for [facility] for 7/7/18  SNF auth # [id number] skilled level 1 update 7/13/18  Ambulance auth for [facility] is [id number]  Spoke with Pt's son, dtr and dtr-in-law informed of tentative discharge this weekend to [facility]  Faxed [facility] to [facility]  If Pt is discharged this weekend, please call [facility] at [phone], fax # is [phone]

## 2018-07-06 NOTE — PHYSICAL THERAPY NOTE
PHYSICAL THERAPY NOTE          Patient Name: Deny Nguyen  [id number] Date: 7/6/2018 07/06/18 1502   Pain Assessment   Pain Assessment FLACC   Pain Score No Pain   Pain Type (? chronic)   Pain Location Generalized   Pain Descriptors Patient unable to describe   Pain Frequency Intermittent   Pain Onset Unable to assess   Clinical Progression Gradually improving   Effect of Pain on Daily Activities guarding w/ positional changes   Patient's Stated Pain Goal No pain   Hospital Pain Intervention(s) Repositioned; Ambulation/increased activity; Distraction;Elevated; Emotional support   Response to Interventions appears to be comfortable at the end of session   Pain Rating: FLACC (Rest) - Face 0   Pain Rating: FLACC (Rest) - Legs 0   Pain Rating: FLACC (Rest) - Activity 0   Pain Rating: FLACC (Rest) - Cry 0   Pain Rating: FLACC (Rest) - Consolability 0   Score: FLACC (Rest) 0   Pain Rating: FLACC (Activity) - Face 1   Pain Rating: FLACC (Activity) - Legs 0   Pain Rating: FLACC (Activity) - Activity 0   Pain Rating: FLACC (Activity) - Cry 1   Pain Rating: FLACC (Activity) - Consolability 1   Score: FLACC (Activity) 3   Restrictions/Precautions   Braces or Orthoses (none at baseline (spoke to family))   Other Precautions Cognitive; Chair Alarm; Fall Risk;O2  (bed alarm on at the end of session)   General   Chart Reviewed Yes   Additional Pertinent History cleared for Tx session (spoke to nsg)   Response to Previous Treatment Patient unable to report, no changes reported from family or staff   Family/Caregiver Present Yes  (children)   Cognition   Overall Cognitive Status Impaired   Arousal/Participation Lethargic;Persistent stimuli required   Attention Difficulty attending to directions   Orientation Level Oriented to person;Oriented to situation   Memory Decreased recall of recent events   Following Commands Follows one step commands with increased time or repetition   Subjective   Subjective Pt is observed sitting in the chair; somnolent; pleasant and cooperative; agreeable to mobilize/get back to bed;   Bed Mobility   Sit to Supine 2  Maximal assistance   Additional items Assist x 2; Increased time required;Verbal cues;LE management  (repositioned higher in bed w/ (A)x2)   Additional Comments Pillows placed for (B) UE and LE support; SCDs back on; call bell w/in reach; bed alarm on   Transfers   Sit to Stand 3  Moderate assistance   Additional items Assist x 2; Increased time required;Verbal cues  (2 trials)   Stand to Sit 3  Moderate assistance   Additional items Assist x 2; Increased time required;Verbal cues  (2 trials)   Stand pivot 3  Moderate assistance   Additional items Assist x 2; Increased time required;Verbal cues  (took a few steps from chair to bed to the (L) side)   Ambulation/Elevation   Gait pattern Not appropriate; Not tested  (decreased endurance and LE control)   Assistive Device Rolling walker   Balance   Static Sitting Fair -   Static Standing Poor   Dynamic Standing Poor -   Endurance Deficit   Endurance Deficit Description O2 sat at 98-97 % and HR in the 80s bpm post mobilization   Activity Tolerance   Activity Tolerance Patient limited by fatigue;Treatment limited secondary to medical complications (Comment)  (decreased BP earlier;)   Nurse Made Aware spoke to Niesha Rowan [place]   Exercises   Hip Abduction Supine;10 reps;AAROM; Bilateral  (2 sets)   Knee AROM Short Arc Quad Supine;10 reps;AAROM; Bilateral  (2 sets)   Ankle Pumps Supine;10 reps;AAROM; Bilateral  (2 sets)   Assessment   Prognosis Fair   Problem List Decreased strength;Decreased endurance; Impaired balance;Decreased mobility; Decreased cognition   Assessment Pt cont to demonstrate general weakness and deconditioning w/ decreased functional endurance and overall tolerance to activities; (A)x2 was required for chair to bed transition w/ rw; pt remained in NAD w/ frequent re-assurance and support provided; pulse ox stable  Currently, cont to recommend rehab upon D/C when medically cleared; will follow  Goals   Patient Goals none stated   STG Expiration Date 07/16/18   Treatment Day 3   Plan   Treatment/Interventions Functional transfer training;LE strengthening/ROM; Therapeutic exercise; Endurance training;Bed mobility;Gait training;Spoke to nursing;OT   Progress Slow progress, decreased activity tolerance   PT Frequency Other (Comment)  (3-5x/wk)   Recommendation   Recommendation Post acute IP rehab   Equipment Recommended Vivien Perrin  (w/ (A))       Liz Navarro, AMANDA

## 2018-07-06 NOTE — PLAN OF CARE
Problem: OCCUPATIONAL THERAPY ADULT  Goal: Performs self-care activities at highest level of function for planned discharge setting  See evaluation for individualized goals  Treatment Interventions: ADL retraining, UE strengthening/ROM          See flowsheet documentation for full assessment, interventions and recommendations  Limitation: Decreased ADL status, Decreased UE strength, Decreased cognition, Decreased high-level ADLs, Decreased endurance  Prognosis: Fair  Assessment: Patient seen for OT session focusing on BUE PROM, cogntive re-orientation, transfers and bed mobility  Patient required A x 2 for transfer chair < bed, fatigued easily and anxiety noted today  Patient not fully oriented and needed encouragement to remain alert and and actively participate with OT          OT Discharge Recommendation: Short Term Rehab

## 2018-07-06 NOTE — PLAN OF CARE
Problem: PHYSICAL THERAPY ADULT  Goal: Performs mobility at highest level of function for planned discharge setting  See evaluation for individualized goals  Treatment/Interventions: Functional transfer training, LE strengthening/ROM, Therapeutic exercise, Endurance training, Bed mobility, Gait training, Spoke to nursing, OT  Equipment Recommended: Jeannie Regalado, PT         See flowsheet documentation for full assessment, interventions and recommendations  Outcome: Progressing  Prognosis: Fair  Problem List: Decreased strength, Decreased endurance, Impaired balance, Decreased mobility, Decreased cognition  Assessment: Pt cont to demonstrate general weakness and deconditioning w/ decreased functional endurance and overall tolerance to activities; (A)x2 was required for chair to bed transition w/ rw; pt remained in NAD w/ frequent re-assurance and support provided; pulse ox stable  Currently, cont to recommend rehab upon D/C when medically cleared; will follow  Recommendation: Post acute IP rehab          See flowsheet documentation for full assessment

## 2018-07-06 NOTE — PROGRESS NOTES
Progress Note - Birdie Alvarez 80 y o  male MRN: [id number]    Unit/Bed#: [address] [place] [address] 202-01 Encounter: [id number]      Assessment:  Principal Problem:    Acute erosive gastritis  Active Problems:    Acute kidney injury ([address] )    Acute on chronic systolic congestive heart failure (HCC)    Acute blood loss anemia    Chronic respiratory failure with hypoxia (HCC)    COPD (chronic obstructive pulmonary disease) (HCC)    ATN (acute tubular necrosis) (HCC)    Chronic kidney disease, stage 3    Type 2 diabetes mellitus with stage 3 chronic kidney disease, without long-term current use of insulin (HCC)    Hypertension    Contusion of flank    Hyperlipidemia    Cancer (HCC)    Parenchymal renal hypertension    Benign prostatic hyperplasia with urinary retention    Acute pain of left shoulder  Resolved Problems:    * No resolved hospital problems  *        Plan:  · Acute erosive gastritis with GI bleed-iron deficiency anemia-hemoglobin slightly improved today at 7 7/25  3  Do not feel he is stable to go for acute rehab today-hope to have stable in 24-48 hours  · Acute on chronic systolic CHF-has been on IV Lasix and will hold as his creatinine is climbing to 2 25 today-cardiology and nephrology input appreciated  · Hypotension-down to 88 systolic this morning- will hold on diuretics and decrease metoprolol to 12 5 mg daily  · Left shoulder pain-has chronic rotator cuff issues-ortho input appreciated has improved with the p o  prednisone  · COPD-no signs of wheezing this morning-continue on O2 at 2 L and inhalers  · Chronic hypoxic respiratory failure-daughter is at bedside and reports that he has been on oxygen continuously since hospital admission for CHF in January and does desaturate when they have tried to wean him off the oxygen  · Diabetes mellitus type 2-elevated creatinine at 251 overnight but improving at 142  this morning    Subjective:   Patient out of bed and chair and denies any chest pain or abdominal pain   He does report that he had some sputum production this morning but denies any shortness of breath now or cough  ROS  Comprehensive system review negative other than noted above    Objective:     Vitals: Blood pressure (!) 88/53, pulse 73, temperature 98 6 °F (37 °C), temperature source Oral, resp  rate 18, height 5' 6" (1 676 m), weight 87 5 kg (192 lb 14 4 oz), SpO2 94 %  ,Body mass index is 31 14 kg/m²    Current Facility-Administered Medications   Medication Dose Route Frequency Provider Last Rate Last Dose    acetaminophen (TYLENOL) tablet 650 mg  650 mg Oral Q6H PRN Bambi Gonzales MD   650 mg at 07/01/18 2016    albuterol inhalation solution 2 5 mg  2 5 mg Nebulization Q6H PRN Bambi Gonzales MD   2 5 mg at 07/01/18 1954    budesonide (PULMICORT) inhalation solution 0 5 mg  0 5 mg Nebulization BID Bambi Gonzales MD   0 5 mg at 07/05/18 0735    furosemide (LASIX) injection 20 mg  20 mg Intravenous TID (diuretic) Patrick Gillis MD   20 mg at 07/06/18 0535    HYDROcodone-acetaminophen (NORCO) 5-325 mg per tablet 1 tablet  1 tablet Oral Q6H PRN Bambi Gonzales MD   1 tablet at 07/03/18 1120    insulin glargine (LANTUS) subcutaneous injection 12 Units 0 12 mL  12 Units Subcutaneous HS Mary Kate Fly, DO   12 Units at 07/05/18 2115    insulin lispro (HumaLOG) 100 units/mL subcutaneous injection 1-5 Units  1-5 Units Subcutaneous HS ROMAINE Dillon   2 Units at 07/05/18 2116    insulin lispro (HumaLOG) 100 units/mL subcutaneous injection 1-6 Units  1-6 Units Subcutaneous TID AC Bambi Gonzales MD   4 Units at 07/05/18 1733    insulin lispro (HumaLOG) 100 units/mL subcutaneous injection 3 Units  3 Units Subcutaneous TID With Meals Patrick Gillis MD   3 Units at 07/05/18 1732    ipratropium-albuterol (DUO-NEB) 0 5-2 5 mg/3 mL inhalation solution 3 mL  3 mL Nebulization TID Jose Proper, DO   3 mL at 07/05/18 2010    latanoprost (XALATAN) 0 005 % ophthalmic solution 1 drop  1 drop Both Eyes BLAKE DODD Cory Peoples MD   1 drop at 18    menthol-methyl salicylate (BENGAY) 97-45 % cream   Apply externally 4x Daily PRN ROMAINE Sanches   1 application at 91/49/10 2008    metoprolol succinate (TOPROL-XL) 24 hr tablet 25 mg  25 mg Oral Daily ROMAINE Ugalde   25 mg at 18 0679    ondansetron (ZOFRAN) injection 4 mg  4 mg Intravenous Q6H PRN Ursula Parker MD        pantoprazole (PROTONIX) EC tablet 40 mg  40 mg Oral Early Morning Kristine Mcguire MD   40 mg at 18 0535    pravastatin (PRAVACHOL) tablet 40 mg  40 mg Oral Daily With Bowen Reich MD   40 mg at 18 1733    predniSONE tablet 5 mg  5 mg Oral Daily With Breakfast Ochoa Eye, DO        traMADol [employer]) tablet 50 mg  50 mg Oral Q6H PRN Mary Kate Fly, DO   50 mg at 18 1735     Prescriptions Prior to Admission   Medication    acetaminophen (TYLENOL) 650 mg suppository    albuterol (2 5 mg/3 mL) 0 083 % nebulizer solution    aspirin 81 MG tablet    budesonide (PULMICORT) 0 5 mg/2 mL nebulizer solution    docusate sodium (COLACE) 100 mg capsule    famotidine (PEPCID) 20 mg tablet    furosemide (LASIX) 40 mg tablet    glimepiride (AMARYL) 1 mg tablet    latanoprost (XALATAN) 0 005 % ophthalmic solution    metolazone (ZAROXOLYN) 5 mg tablet    metoprolol succinate (TOPROL-XL) 25 mg 24 hr tablet    polyethylene glycol (MIRALAX) 17 g packet    [] predniSONE 10 mg tablet    senna (SENOKOT) 8 6 MG tablet    simvastatin (ZOCOR) 20 mg tablet    sodium chloride (OCEAN) 0 65 % nasal spray    spironolactone (ALDACTONE) 25 mg tablet    umeclidinium-vilanterol (ANORO ELLIPTA) 62 5-25 MCG/INH inhaler    acetaminophen-codeine (TYLENOL #3) 300-30 mg per tablet    cephalexin (KEFLEX) 500 mg capsule         Intake/Output Summary (Last 24 hours) at 18 0800  Last data filed at 18 0755   Gross per 24 hour   Intake              600 ml   Output             2945 ml   Net            -2345 ml Physical Exam:  General appearance: alert and cooperative  Neck: no adenopathy, no JVD and thyroid not enlarged, symmetric, no tenderness/mass/nodules  Lungs: Minimal decreased breath sounds in the bases  No wheezes  Heart: regular rate and rhythm  Abdomen: soft, non-tender; bowel sounds normal; no masses,  no organomegaly  Extremities: extremities normal, warm and well-perfused; no cyanosis, clubbing, or edema  Skin: Skin color, texture, turgor normal  No rashes or lesions  Neurologic:  No tremors  No focal motor deficits       Lab, Imaging and other studies: I have personally reviewed pertinent reports  Results from last 7 days  Lab Units 07/06/18  0205 07/05/18  0609 07/04/18  0432 07/03/18  0459 07/02/18  0502  07/01/18  0744   WBC Thousand/uL 9 78  --  11 05* 11 06* 9 58  --  15 45*   HEMOGLOBIN g/dL 7 7* 7 5* 7 8* 8 0* 7 6*  < > 7 5*   HEMATOCRIT % 25 3* 24 8* 26 1* 26 3* 24 4*  < > 25 6*   PLATELETS Thousands/uL 203  --  207 206 216  --  286   NEUTROS PCT %  --   --   --  83* 81*  --   --    LYMPHS PCT %  --   --   --  6* 9*  --   --    LYMPHO PCT %  --   --   --   --   --   --  4*   MONOS PCT %  --   --   --  9 8  --   --    MONO PCT MAN %  --   --   --   --   --   --  6   EOS PCT %  --   --   --  1 1  --   --    EOSINO PCT MANUAL %  --   --   --   --   --   --  0   < > = values in this interval not displayed      Results from last 7 days  Lab Units 07/06/18  0205 07/05/18  0609 07/04/18  0432  07/02/18  0503  07/01/18  0744   SODIUM mmol/L 141 140 141  < > 142  < > 138   POTASSIUM mmol/L 3 9 4 1 4 3  < > 3 9  < > 4 8   CHLORIDE mmol/L 102 102 103  < > 102  < > 95*   CO2 mmol/L 30 31 32  < > 32  < > 31   BUN mg/dL 90* 90* 86*  < > 99*  < > 103*   CREATININE mg/dL 2 25* 2 08* 2 21*  < > 2 56*  < > 2 81*   CALCIUM mg/dL 8 4 8 3 7 9*  < > 7 8*  < > 8 0*   TOTAL PROTEIN g/dL 6 7  --   --   --  6 2*  --  7 4   BILIRUBIN TOTAL mg/dL 0 40  --   --   --  0 80  --  0 40   ALK PHOS U/L 63  --   -- --  53  --  70   ALT U/L 23  --   --   --  20  --  21   AST U/L 25  --   --   --  22  --  27   GLUCOSE RANDOM mg/dL 142* 162* 201*  < > 135  < > 342*   < > = values in this interval not displayed  Lab Results   Component Value Date    TROPONINI 0 09 (H) 07/03/2018    TROPONINI 0 08 (H) 07/03/2018    TROPONINI 0 10 (H) 07/03/2018    CKTOTAL 56 07/01/2018       Results from last 7 days  Lab Units 07/01/18  0744   INR  1 17     No results found for: Padmini Caller, SPUTUMCULTUR    Imaging:  Results for orders placed during the hospital encounter of 07/01/18   XR chest portable    Narrative CHEST     INDICATION:   Shortness of breath  Congestive failure       COMPARISON:  None    EXAM PERFORMED/VIEWS:  XR CHEST PORTABLE      FINDINGS:  There are median sternotomy wires indicating prior cardiac surgery  Dual-lead left chest pacemaker is noted prosthetic cardiac valve is seen  Heart is enlarged  Atherosclerotic vascular calcifications are noted  Mild pulmonary vascular congestive changes  Left lung base is poorly evaluated there appears to be a left pleural effusion  Trace costophrenic angle effusion on the right  Relative increased parenchymal density in the left perihilar region of the right lower lobe raises the suspicion of either   atelectasis or pneumonia  Osseous structures appear within normal limits for patient age  Impression Cardiomegaly  Mild pulmonary vascular congestive changes  Left greater than right pleural effusions  Relative increased parenchymal density in the left perihilar region and right lower lobe could represent atelectasis or small patchy areas of   pneumonia  Workstation performed: [id number]       No results found for this or any previous visit  PATIENT CENTERED ROUNDS: I have performed rounds with the nursing staff            Vikram Barrett DO

## 2018-07-07 PROBLEM — K59.00 CONSTIPATION: Status: ACTIVE | Noted: 2018-07-07

## 2018-07-07 LAB
ANION GAP SERPL CALCULATED.3IONS-SCNC: 6 MMOL/L (ref 4–13)
BUN SERPL-MCNC: 80 MG/DL (ref 5–25)
CALCIUM SERPL-MCNC: 8.3 MG/DL (ref 8.3–10.1)
CHLORIDE SERPL-SCNC: 103 MMOL/L (ref 100–108)
CO2 SERPL-SCNC: 36 MMOL/L (ref 21–32)
CREAT SERPL-MCNC: 2.05 MG/DL (ref 0.6–1.3)
ERYTHROCYTE [DISTWIDTH] IN BLOOD BY AUTOMATED COUNT: 15.4 % (ref 11.6–15.1)
GFR SERPL CREATININE-BSD FRML MDRD: 28 ML/MIN/1.73SQ M
GLUCOSE SERPL-MCNC: 114 MG/DL (ref 65–140)
GLUCOSE SERPL-MCNC: 166 MG/DL (ref 65–140)
GLUCOSE SERPL-MCNC: 187 MG/DL (ref 65–140)
GLUCOSE SERPL-MCNC: 266 MG/DL (ref 65–140)
GLUCOSE SERPL-MCNC: 283 MG/DL (ref 65–140)
HCT VFR BLD AUTO: 26.6 % (ref 36.5–49.3)
HGB BLD-MCNC: 7.9 G/DL (ref 12–17)
MCH RBC QN AUTO: 27.1 PG (ref 26.8–34.3)
MCHC RBC AUTO-ENTMCNC: 29.7 G/DL (ref 31.4–37.4)
MCV RBC AUTO: 91 FL (ref 82–98)
PLATELET # BLD AUTO: 211 THOUSANDS/UL (ref 149–390)
PMV BLD AUTO: 10.6 FL (ref 8.9–12.7)
POTASSIUM SERPL-SCNC: 4.1 MMOL/L (ref 3.5–5.3)
RBC # BLD AUTO: 2.91 MILLION/UL (ref 3.88–5.62)
SODIUM SERPL-SCNC: 145 MMOL/L (ref 136–145)
WBC # BLD AUTO: 9.62 THOUSAND/UL (ref 4.31–10.16)

## 2018-07-07 PROCEDURE — 82948 REAGENT STRIP/BLOOD GLUCOSE: CPT

## 2018-07-07 PROCEDURE — 99233 SBSQ HOSP IP/OBS HIGH 50: CPT | Performed by: INTERNAL MEDICINE

## 2018-07-07 PROCEDURE — 99232 SBSQ HOSP IP/OBS MODERATE 35: CPT | Performed by: INTERNAL MEDICINE

## 2018-07-07 PROCEDURE — 80048 BASIC METABOLIC PNL TOTAL CA: CPT | Performed by: INTERNAL MEDICINE

## 2018-07-07 PROCEDURE — 94760 N-INVAS EAR/PLS OXIMETRY 1: CPT

## 2018-07-07 PROCEDURE — 85027 COMPLETE CBC AUTOMATED: CPT | Performed by: INTERNAL MEDICINE

## 2018-07-07 PROCEDURE — 94640 AIRWAY INHALATION TREATMENT: CPT

## 2018-07-07 RX ORDER — LACTULOSE 20 G/30ML
20 SOLUTION ORAL DAILY
Status: DISCONTINUED | OUTPATIENT
Start: 2018-07-08 | End: 2018-07-07

## 2018-07-07 RX ORDER — LACTULOSE 20 G/30ML
20 SOLUTION ORAL DAILY
Status: DISCONTINUED | OUTPATIENT
Start: 2018-07-07 | End: 2018-07-07

## 2018-07-07 RX ORDER — SENNOSIDES 8.6 MG
1 TABLET ORAL
Status: DISCONTINUED | OUTPATIENT
Start: 2018-07-07 | End: 2018-07-13 | Stop reason: HOSPADM

## 2018-07-07 RX ORDER — BISACODYL 10 MG
10 SUPPOSITORY, RECTAL RECTAL DAILY
Status: DISCONTINUED | OUTPATIENT
Start: 2018-07-07 | End: 2018-07-09

## 2018-07-07 RX ORDER — INSULIN GLARGINE 100 [IU]/ML
18 INJECTION, SOLUTION SUBCUTANEOUS
Status: DISCONTINUED | OUTPATIENT
Start: 2018-07-07 | End: 2018-07-13 | Stop reason: HOSPADM

## 2018-07-07 RX ORDER — LACTULOSE 20 G/30ML
20 SOLUTION ORAL DAILY
Status: DISCONTINUED | OUTPATIENT
Start: 2018-07-07 | End: 2018-07-09

## 2018-07-07 RX ORDER — FUROSEMIDE 10 MG/ML
20 INJECTION INTRAMUSCULAR; INTRAVENOUS
Status: DISCONTINUED | OUTPATIENT
Start: 2018-07-07 | End: 2018-07-08

## 2018-07-07 RX ORDER — SODIUM PHOSPHATE,MONO-DIBASIC 19G-7G/118
1 ENEMA (ML) RECTAL DAILY PRN
Status: DISCONTINUED | OUTPATIENT
Start: 2018-07-07 | End: 2018-07-09

## 2018-07-07 RX ADMIN — IPRATROPIUM BROMIDE AND ALBUTEROL SULFATE 3 ML: 2.5; .5 SOLUTION RESPIRATORY (INHALATION) at 20:35

## 2018-07-07 RX ADMIN — SALINE LAXATIVE 1 ENEMA: 7; 19 ENEMA RECTAL at 16:01

## 2018-07-07 RX ADMIN — INSULIN LISPRO 3 UNITS: 100 INJECTION, SOLUTION INTRAVENOUS; SUBCUTANEOUS at 18:13

## 2018-07-07 RX ADMIN — PREDNISONE 5 MG: 5 TABLET ORAL at 10:01

## 2018-07-07 RX ADMIN — INSULIN LISPRO 4 UNITS: 100 INJECTION, SOLUTION INTRAVENOUS; SUBCUTANEOUS at 11:43

## 2018-07-07 RX ADMIN — METOPROLOL SUCCINATE 12.5 MG: 25 TABLET, EXTENDED RELEASE ORAL at 10:14

## 2018-07-07 RX ADMIN — PANTOPRAZOLE SODIUM 40 MG: 40 TABLET, DELAYED RELEASE ORAL at 05:54

## 2018-07-07 RX ADMIN — LACTULOSE 20 G: 10 SOLUTION ORAL at 11:49

## 2018-07-07 RX ADMIN — PRAVASTATIN SODIUM 40 MG: 40 TABLET ORAL at 18:15

## 2018-07-07 RX ADMIN — FUROSEMIDE 20 MG: 10 INJECTION, SOLUTION INTRAVENOUS at 18:14

## 2018-07-07 RX ADMIN — LACTULOSE 20 G: 20 SOLUTION ORAL at 11:37

## 2018-07-07 RX ADMIN — FERROUS SULFATE TAB 325 MG (65 MG ELEMENTAL FE) 325 MG: 325 (65 FE) TAB at 10:01

## 2018-07-07 RX ADMIN — ZINC 1 TABLET: TAB ORAL at 10:00

## 2018-07-07 RX ADMIN — IPRATROPIUM BROMIDE AND ALBUTEROL SULFATE 3 ML: 2.5; .5 SOLUTION RESPIRATORY (INHALATION) at 07:49

## 2018-07-07 RX ADMIN — IPRATROPIUM BROMIDE AND ALBUTEROL SULFATE 3 ML: 2.5; .5 SOLUTION RESPIRATORY (INHALATION) at 13:26

## 2018-07-07 RX ADMIN — FUROSEMIDE 20 MG: 10 INJECTION, SOLUTION INTRAVENOUS at 10:01

## 2018-07-07 RX ADMIN — BUDESONIDE 0.5 MG: 0.5 INHALANT RESPIRATORY (INHALATION) at 20:35

## 2018-07-07 RX ADMIN — LATANOPROST 1 DROP: 50 SOLUTION OPHTHALMIC at 21:20

## 2018-07-07 RX ADMIN — INSULIN GLARGINE 18 UNITS: 100 INJECTION, SOLUTION SUBCUTANEOUS at 21:20

## 2018-07-07 RX ADMIN — BISACODYL 10 MG: 10 SUPPOSITORY RECTAL at 11:37

## 2018-07-07 RX ADMIN — BUDESONIDE 0.5 MG: 0.5 INHALANT RESPIRATORY (INHALATION) at 07:49

## 2018-07-07 RX ADMIN — SENNOSIDES 8.6 MG: 8.6 TABLET, FILM COATED ORAL at 21:20

## 2018-07-07 NOTE — PROGRESS NOTES
NEPHROLOGY PROGRESS NOTE   Richard Hernandez 80 y o  male MRN: [id number]  Unit/Bed#: [address] [place] [address] 202-01 Encounter: [id number]  Reason for Consult:  Acute kidney injury    ASSESSMENT/PLAN:  1  Acute kidney injury, previously secondary to ATN, postobstructive now possible component of cardiorenal syndrome  2  Chronic kidney disease, baseline creatinine approximately 1 5  3  Acute on chronic anemia, status post PRBC transfusion, EGD shows gastritis  4  Constipation, appears to be resolving  5  Cardiomyopathy, ejection fraction of 30-35% currently on IV diuresis  6  History of urinary retention, Moralez catheter removed, continue bladder scans Q shift  7  Transient hypotension yesterday, appears to be stable today    PLAN:  · Currently on IV Lasix, negative balance as tolerated  · Blood pressure appears to be better today  · Electrolytes appear fairly stable      SUBJECTIVE:  Seen and examined  Patient awakens with stimuli  Significant constipation although appears to be improving today, multiple episodes of bowel movement  Does not appear short of breath, no reported chest pain  Family at bedside    Review of Systems    OBJECTIVE:  Current Weight: Weight - Scale: 87 5 kg (192 lb 14 4 oz)  Vitals:    07/07/18 0749 07/07/18 0900 07/07/18 1100 07/07/18 1326   BP:  122/60     BP Location:  Right arm     Pulse:  83     Resp:       Temp:       TempSrc:       SpO2: 96%  92% 92%   Weight:       Height:           Intake/Output Summary (Last 24 hours) at 07/07/18 1729  Last data filed at 07/07/18 1142   Gross per 24 hour   Intake                0 ml   Output              771 ml   Net             -771 ml       Physical Exam   Constitutional: No distress  HENT:   Head: Normocephalic  Eyes: No scleral icterus  Neck: Neck supple  Cardiovascular: Normal rate and regular rhythm  Pulmonary/Chest: Effort normal and breath sounds normal    Abdominal: Soft  He exhibits distension  There is no tenderness     Musculoskeletal: He exhibits edema (Trace to 1+ edema bilaterally)  Neurological: He is alert  Skin: Skin is warm and dry         Medications:    Current Facility-Administered Medications:     acetaminophen (TYLENOL) tablet 650 mg, 650 mg, Oral, Q6H PRN, Marly Velazquez MD, 650 mg at 07/01/18 2016    albuterol inhalation solution 2 5 mg, 2 5 mg, Nebulization, Q6H PRN, Marly Velazquez MD, 2 5 mg at 07/01/18 1954    bisacodyl (DULCOLAX) rectal suppository 10 mg, 10 mg, Rectal, Daily, Nery Temple MD, 10 mg at 07/07/18 1137    budesonide (PULMICORT) inhalation solution 0 5 mg, 0 5 mg, Nebulization, BID, Marly Velazquez MD, 0 5 mg at 07/07/18 0749    ferrous sulfate tablet 325 mg, 325 mg, Oral, Daily With Breakfast, Mary Kate Fly, DO, 325 mg at 07/07/18 1001    furosemide (LASIX) injection 20 mg, 20 mg, Intravenous, TID (diuretic), Winter Mosqueda MD, 20 mg at 07/07/18 1001    HYDROcodone-acetaminophen (NORCO) 5-325 mg per tablet 1 tablet, 1 tablet, Oral, Q6H PRN, Marly Velazquez MD, 1 tablet at 07/03/18 1120    insulin glargine (LANTUS) subcutaneous injection 18 Units 0 18 mL, 18 Units, Subcutaneous, HS, Winter Mosqueda MD    insulin lispro (HumaLOG) 100 units/mL subcutaneous injection 1-5 Units, 1-5 Units, Subcutaneous, HS, ROMAINE Dillon, 2 Units at 07/06/18 2114    insulin lispro (HumaLOG) 100 units/mL subcutaneous injection 1-6 Units, 1-6 Units, Subcutaneous, TID AC, 4 Units at 07/07/18 1143 **AND** Fingerstick Glucose (POCT), , , TID AC, Marly Velazquez MD    insulin lispro (HumaLOG) 100 units/mL subcutaneous injection 5 Units, 5 Units, Subcutaneous, TID With Meals, Winter Mosqueda MD, 5 Units at 07/07/18 1143    ipratropium-albuterol (DUO-NEB) 0 5-2 5 mg/3 mL inhalation solution 3 mL, 3 mL, Nebulization, TID, Rodrigo Holland DO, 3 mL at 07/07/18 1326    lactulose 20 g/30 mL oral solution 20 g, 20 g, Oral, Daily, Winter Mosqueda MD, 20 g at 07/07/18 1137    latanoprost (XALATAN) 0 005 % ophthalmic solution 1 drop, 1 drop, Both Eyes, HS, Bambi Gonzales MD, 1 drop at 07/06/18 2118    menthol-methyl salicylate (BENGAY) 10-48 % cream, , Apply externally, 4x Daily PRN, ROMAINE Painting, 1 application at 52/31/50 2008    metoprolol succinate (TOPROL-XL) 24 hr tablet 12 5 mg, 12 5 mg, Oral, Daily, Mary Kate Fly, DO, 12 5 mg at 07/07/18 1014    multivitamin stress formula tablet 1 tablet, 1 tablet, Oral, Daily, Mary Kate Fly, DO, 1 tablet at 07/07/18 1000    ondansetron (ZOFRAN) injection 4 mg, 4 mg, Intravenous, Q6H PRN, Bambi Gonzales MD    pantoprazole (PROTONIX) EC tablet 40 mg, 40 mg, Oral, Early Morning, Doroteo Bryan MD, 40 mg at 07/07/18 0554    polyethylene glycol (MIRALAX) packet 17 g, 17 g, Oral, BID PRN, ROMAINE Pryor    pravastatin (PRAVACHOL) tablet 40 mg, 40 mg, Oral, Daily With Yevgeniy Page MD, 40 mg at 07/06/18 1804    predniSONE tablet 5 mg, 5 mg, Oral, Daily With Breakfast, Patrick Gillis MD, 5 mg at 07/07/18 1001    senna (SENOKOT) tablet 8 6 mg, 1 tablet, Oral, HS, Patrick Gillis MD    sodium phosphate-biphosphate (FLEET) enema 1 enema, 1 enema, Rectal, Daily PRN, Farzana Higginbotham DO, 1 enema at 07/07/18 1601    traMADol (ULTRAM) tablet 50 mg, 50 mg, Oral, Q6H PRN, Mary Kate Fly, DO, 50 mg at 07/03/18 1735    Laboratory Results:    Results from last 7 days  Lab Units 07/07/18  0442 07/06/18  0205 07/05/18  0609 07/04/18  0432 07/03/18  0722 07/03/18  0459 07/02/18  0503 07/02/18  0502 07/02/18  0032 07/01/18  1923  07/01/18  0744   WBC Thousand/uL 9 62 9 78  --  11 05*  --  11 06*  --  9 58  --   --   --  15 45*   HEMOGLOBIN g/dL 7 9* 7 7* 7 5* 7 8*  --  8 0*  --  7 6* 7 8* 7 8*  < > 7 5*   HEMATOCRIT % 26 6* 25 3* 24 8* 26 1*  --  26 3*  --  24 4* 24 9* 24 9*  < > 25 6*   PLATELETS Thousands/uL 211 203  --  207  --  206  --  216  --   --   --  286   SODIUM mmol/L 145 141 140 141 142  --  142  --   --  139  --  138   POTASSIUM mmol/L 4 1 3 9 4 1 4 3 3 8  --  3 9  --   --  4 2  --  4 8 CHLORIDE mmol/L 103 102 102 103 104  --  102  --   --  100  --  95*   CO2 mmol/L 36* 30 31 32 33*  --  32  --   --  33*  --  31   BUN mg/dL 80* 90* 90* 86* 93*  --  99*  --   --  103*  --  103*   CREATININE mg/dL 2 05* 2 25* 2 08* 2 21* 2 54*  --  2 56*  --   --  2 52*  --  2 81*   CALCIUM mg/dL 8 3 8 4 8 3 7 9* 7 9*  --  7 8*  --   --  8 2*  --  8 0*   MAGNESIUM mg/dL  --   --   --  2 5  --   --  2 3  --   --   --   --   --    PHOSPHORUS mg/dL  --   --   --   --   --   --  4 4*  --   --   --   --   --    TOTAL PROTEIN g/dL  --  6 7  --   --   --   --  6 2*  --   --   --   --  7 4   GLUCOSE RANDOM mg/dL 166* 142* 162* 201* 159*  --  135  --   --  208*  --  342*   < > = values in this interval not displayed

## 2018-07-07 NOTE — ASSESSMENT & PLAN NOTE
Creatinine is 2 05 this morning improved but patient clearly has acute systolic CHF I will have to resume IV Lasix

## 2018-07-07 NOTE — PROGRESS NOTES
Having urgency  With voiding    Bladder scan for 100     Very SOB crackles  Inbases   94% on 2 L    abd very distended BS no Bm since 7/1    Dr Lia Alarcon in family at bedside    Started back on IV lasix  Given lactulose and dulcolax suppositry with no results but pt very uncomfortable Eventually needing a fleets enema

## 2018-07-07 NOTE — ASSESSMENT & PLAN NOTE
EGD showed erosive gastritis  Patient has no recurrent signs of bleeding    Will continue Protonix p   O ,

## 2018-07-07 NOTE — PROGRESS NOTES
Progress Note - Delrae Stain 3/15/1927, 80 y o  male MRN: [id number]    Unit/Bed#: [address] [place], [place] [address] Encounter: [id number]    Primary Care Provider: Laurel Bowen MD   Date and time admitted to hospital: 7/1/2018  7:23 AM        * Acute erosive gastritis   Assessment & Plan    EGD showed erosive gastritis  Patient has no recurrent signs of bleeding  Will continue Protonix p   O ,         Acute blood loss anemia   Assessment & Plan    Hemoglobin increased to 7 9 this morning        Acute kidney injury ([facility] [address] )   Assessment & Plan    Creatinine is 2 05 this morning improved but patient clearly has acute systolic CHF I will have to resume IV Lasix        Type 2 diabetes mellitus with stage 3 chronic kidney disease, without long-term current use of insulin [facility])   Assessment & Plan    No results found for: HGBA1C    Recent Labs      07/06/18   1122  07/06/18   1725  07/06/18   2112  07/07/18   0641   POCGLU  253*  217*  220*  187*       Blood Sugar Average: Last 72 hrs:  (P) 186 [id number]     Blood sugars are uncontrolled more than 200  Will increase Lantus to 18 units in the evening and pre meal Humalog to 5 units t i d  before meals        Acute on chronic systolic congestive heart failure (HCC)   Assessment & Plan    Ejection fraction is 35%  Patient's chest x-ray yesterday showed still shows acute CHF  I resumed Lasix 20 mg IV t i d  blood pressure permitting he is not ready for discharge today  I discussed the case with patient's daughter at the bedside in detail        Benign prostatic hyperplasia with urinary retention   Assessment & Plan    Patient's postvoid residual by bladder scan this morning was 100 cc  Will continue and monitoring postvoid residual volume q 4 hours  Constipation most likely contributes to patient's sensation of inability to urinate        Constipation   Assessment & Plan    Patient has not had a bowel movement for more than 5 days that    Will try lactulose and Senokot in addition to MiraLax pre constipation contributes to BPH with feelings of inability to urinate            VTE Prophylaxis: in place    Patient Centered Rounds: I rounded with patient's nurse    Current Length of Stay: 6 day(s)    Current Patient Status: Inpatient    Certification Statement: Pt requires additional inpatient hospital stay due to: see assessment and plan        Subjective:   Patient complains of shortness of breath, wheezing, difficulty voiding, constipation  Denies chest current pleurisy, palpitations, nausea, abdominal pain, signs of bleeding  Patient's nurse reports that patient's blood pressure is 122/60 right now and postvoid residual bladder scans 100 cc  Chest x-ray from yesterday shows acute CHF  All other ROS are negative    Objective:     Vitals:   Temp (24hrs), Av 7 °F (37 1 °C), Min:98 1 °F (36 7 °C), Max:99 3 °F (37 4 °C)    HR:  [85-96] 96  Resp:  [18-28] 28  BP: ()/(52-57) 88/52  SpO2:  [95 %-97 %] 97 %  Body mass index is 31 14 kg/m²  Input and Output Summary (last 24 hours): Intake/Output Summary (Last 24 hours) at 18 0907  Last data filed at 18 5631   Gross per 24 hour   Intake                0 ml   Output             1223 ml   Net            -1223 ml       Physical Exam:     Physical Exam   Constitutional: He appears well-developed  HENT:   Head: Normocephalic  Mouth/Throat: Oropharynx is clear and moist    Eyes: Conjunctivae are normal    Neck: Neck supple  JVD present  Cardiovascular: Normal rate and regular rhythm  Distant heart sounds   Pulmonary/Chest: He is in respiratory distress  He has wheezes  Abdominal: Bowel sounds are normal  He exhibits distension  There is no tenderness  There is no guarding  Musculoskeletal: He exhibits no tenderness  Lymphadenopathy:     He has no cervical adenopathy  Neurological: He is alert  No cranial nerve deficit  He exhibits normal muscle tone  Skin: Skin is warm and dry  No rash noted  Psychiatric: He has a normal mood and affect  Vitals reviewed  I personally reviewed labs and imaging reports for today  Last 24 Hours Medication List:     Current Facility-Administered Medications:  acetaminophen 650 mg Oral Q6H PRN Gage Del Rio MD   albuterol 2 5 mg Nebulization Q6H PRN Gage Del Rio MD   budesonide 0 5 mg Nebulization BID Gage Del Rio MD   ferrous sulfate 325 mg Oral Daily With Breakfast Mary Kate Fly, DO   furosemide 20 mg Intravenous TID (diuretic) Anila Jett MD   HYDROcodone-acetaminophen 1 tablet Oral Q6H PRN Gage Del Rio MD   insulin glargine 18 Units Subcutaneous HS Anila Jett MD   insulin lispro 1-5 Units Subcutaneous HS ROMAINE Dillon   insulin lispro 1-6 Units Subcutaneous TID AC Gage Del Rio MD   insulin lispro 5 Units Subcutaneous TID With Meals Anila Jett MD   ipratropium-albuterol 3 mL Nebulization TID Carisa Cisneros DO   [START ON 7/8/2018] lactulose 20 g Oral Daily Anila Jett MD   latanoprost 1 drop Both Eyes HS Gage Del Rio MD   menthol-methyl salicylate  Apply externally 4x Daily PRN Haley Cough, CRNP   metoprolol succinate 12 5 mg Oral Daily Mary Kate Fly, DO   multivitamin stress formula 1 tablet Oral Daily Mary Kate Fly, DO   ondansetron 4 mg Intravenous Q6H PRN Gage Del Rio MD   pantoprazole 40 mg Oral Early Morning Doreen Reynolds MD   polyethylene glycol 17 g Oral BID PRN ROMAINE Corea   pravastatin 40 mg Oral Daily With JUSTO Gracia MD   predniSONE 5 mg Oral Daily With Breakfast Anila Jett MD   senna 1 tablet Oral HS Anila Jett MD   traMADol 50 mg Oral Q6H PRN Kenia Erwin DO          Today, Patient Was Seen By: Anila Jett MD    ** Please Note: Dictation voice to text software may have been used in the creation of this document   **

## 2018-07-07 NOTE — ASSESSMENT & PLAN NOTE
No results found for: HGBA1C    Recent Labs      07/06/18   1122  07/06/18   1725  07/06/18   2112  07/07/18   0641   POCGLU  253*  217*  220*  187*       Blood Sugar Average: Last 72 hrs:  (P) 970 [id number]     Blood sugars are uncontrolled more than 200    Will increase Lantus to 18 units in the evening and pre meal Humalog to 5 units t i d  before meals

## 2018-07-07 NOTE — ASSESSMENT & PLAN NOTE
Patient's postvoid residual by bladder scan this morning was 100 cc  Will continue and monitoring postvoid residual volume q 4 hours    Constipation most likely contributes to patient's sensation of inability to urinate

## 2018-07-07 NOTE — ASSESSMENT & PLAN NOTE
Patient has not had a bowel movement for more than 5 days that    Will try lactulose and Senokot in addition to MiraLax pre constipation contributes to BPH with feelings of inability to urinate

## 2018-07-07 NOTE — PROGRESS NOTES
Joseline Shaw  [id number]    71 y o   male      ASSESSMENT  1   Symptomatic anemia with melena   EGD 7/2 showed erosive gastritis and a small hiatal hernia  + intestinal metaplasia of stomach  H P negative   No overt bleeding site identified   Hemoglobin remains stable   2   Acute anemia probably acute blood loss anemia  3   GERD on Pepcid  4   Distant history of iron deficiency anemia and occult GI bleeding   S/P EGD/colonoscopy/capsule endoscopy 2007   Treated with a year course of oral iron  5   Distant history of peptic ulcer disease  6   Distant history of colon cancer  7   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  with congestive heart failure followed by Dr Alexus Page  8   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  9   H/O AAA  10   DM  PLAN  1   Follow H&H and transfuse as needed  2   Watch for overt GI blood loss  3   Continue Protonix  4   A colonoscopy to be considered if hemoglobin drops further- but hope to avoid this due to age and comorbidities  5  Will add duplex suppository for constipation lactulose has been ordered  SUBJECTIVE/HPI still no bowel movement  He is eating but feels uncomfortable  Frequent urination  /60 (BP Location: Right arm)   Pulse 83   Temp 98 1 °F (36 7 °C) (Oral)   Resp (!) 28   Ht 5' 6" (1 676 m)   Wt 87 5 kg (192 lb 14 4 oz)   SpO2 97%   BMI 31 14 kg/m²       Physical Exam:  Awake and alert mild to moderate distress skin is pale mucous membranes dry abdomen is distended hypoactive bowel sounds mild diffuse tenderness no rebound guarding hepatosplenomegaly       Lab Results   Component Value Date    GLUCOSE 166 (H) 07/07/2018    CALCIUM 8 3 07/07/2018     07/07/2018    K 4 1 07/07/2018    CO2 36 (H) 07/07/2018     07/07/2018    BUN 80 (H) 07/07/2018    CREATININE 2 05 (H) 07/07/2018     Lab Results   Component Value Date    WBC 9 62 07/07/2018    HGB 7 9 (L) 07/07/2018    HCT 26 6 (L) 07/07/2018    MCV 91 07/07/2018     07/07/2018 Lab Results   Component Value Date    ALT 23 07/06/2018    AST 25 07/06/2018    ALKPHOS 63 07/06/2018    BILITOT 0 40 07/06/2018     No components found for: AMYLJKJJJASE  No results found for: LIPASE  Lab Results   Component Value Date    IRON 18 (L) 07/02/2018    TIBC 423 07/02/2018    FERRITIN 47 07/02/2018     Lab Results   Component Value Date    INR 1 17 07/01/2018       Total floor / unit time spent today 35 minutes  Greater than 50% of total time was spent with the patient and / or family counseling and / or coordination of care  A description of the counseling / coordination of care: Thirty-five/ coordination of care:     Ana Rosa Bradford MD

## 2018-07-07 NOTE — ASSESSMENT & PLAN NOTE
Ejection fraction is 35%  Patient's chest x-ray yesterday showed still shows acute CHF  I resumed Lasix 20 mg IV t i d  blood pressure permitting he is not ready for discharge today    I discussed the case with patient's daughter at the bedside in detail

## 2018-07-08 LAB
ANION GAP SERPL CALCULATED.3IONS-SCNC: 7 MMOL/L (ref 4–13)
BACTERIA SPT RESP CULT: ABNORMAL
BACTERIA SPT RESP CULT: ABNORMAL
BUN SERPL-MCNC: 71 MG/DL (ref 5–25)
CALCIUM SERPL-MCNC: 8.2 MG/DL (ref 8.3–10.1)
CHLORIDE SERPL-SCNC: 104 MMOL/L (ref 100–108)
CO2 SERPL-SCNC: 34 MMOL/L (ref 21–32)
CREAT SERPL-MCNC: 1.92 MG/DL (ref 0.6–1.3)
ERYTHROCYTE [DISTWIDTH] IN BLOOD BY AUTOMATED COUNT: 16 % (ref 11.6–15.1)
GFR SERPL CREATININE-BSD FRML MDRD: 30 ML/MIN/1.73SQ M
GLUCOSE SERPL-MCNC: 113 MG/DL (ref 65–140)
GLUCOSE SERPL-MCNC: 116 MG/DL (ref 65–140)
GLUCOSE SERPL-MCNC: 117 MG/DL (ref 65–140)
GLUCOSE SERPL-MCNC: 210 MG/DL (ref 65–140)
GLUCOSE SERPL-MCNC: 223 MG/DL (ref 65–140)
GRAM STN SPEC: ABNORMAL
HCT VFR BLD AUTO: 27.6 % (ref 36.5–49.3)
HGB BLD-MCNC: 8.1 G/DL (ref 12–17)
MCH RBC QN AUTO: 27 PG (ref 26.8–34.3)
MCHC RBC AUTO-ENTMCNC: 29.3 G/DL (ref 31.4–37.4)
MCV RBC AUTO: 92 FL (ref 82–98)
PLATELET # BLD AUTO: 243 THOUSANDS/UL (ref 149–390)
PMV BLD AUTO: 10.4 FL (ref 8.9–12.7)
POTASSIUM SERPL-SCNC: 3.6 MMOL/L (ref 3.5–5.3)
RBC # BLD AUTO: 3 MILLION/UL (ref 3.88–5.62)
SODIUM SERPL-SCNC: 145 MMOL/L (ref 136–145)
WBC # BLD AUTO: 12.43 THOUSAND/UL (ref 4.31–10.16)

## 2018-07-08 PROCEDURE — 99232 SBSQ HOSP IP/OBS MODERATE 35: CPT | Performed by: INTERNAL MEDICINE

## 2018-07-08 PROCEDURE — 80048 BASIC METABOLIC PNL TOTAL CA: CPT | Performed by: INTERNAL MEDICINE

## 2018-07-08 PROCEDURE — 85027 COMPLETE CBC AUTOMATED: CPT | Performed by: INTERNAL MEDICINE

## 2018-07-08 PROCEDURE — 94640 AIRWAY INHALATION TREATMENT: CPT

## 2018-07-08 PROCEDURE — 82948 REAGENT STRIP/BLOOD GLUCOSE: CPT

## 2018-07-08 PROCEDURE — 94760 N-INVAS EAR/PLS OXIMETRY 1: CPT

## 2018-07-08 RX ORDER — LEVOFLOXACIN 250 MG/1
250 TABLET ORAL EVERY 24 HOURS
Status: DISCONTINUED | OUTPATIENT
Start: 2018-07-08 | End: 2018-07-13 | Stop reason: HOSPADM

## 2018-07-08 RX ORDER — FUROSEMIDE 20 MG/1
20 TABLET ORAL
Status: DISCONTINUED | OUTPATIENT
Start: 2018-07-08 | End: 2018-07-09

## 2018-07-08 RX ADMIN — BUDESONIDE 0.5 MG: 0.5 INHALANT RESPIRATORY (INHALATION) at 07:29

## 2018-07-08 RX ADMIN — METOPROLOL SUCCINATE 12.5 MG: 25 TABLET, EXTENDED RELEASE ORAL at 09:07

## 2018-07-08 RX ADMIN — LEVOFLOXACIN 250 MG: 250 TABLET, FILM COATED ORAL at 16:31

## 2018-07-08 RX ADMIN — SENNOSIDES 8.6 MG: 8.6 TABLET, FILM COATED ORAL at 21:24

## 2018-07-08 RX ADMIN — LATANOPROST 1 DROP: 50 SOLUTION OPHTHALMIC at 21:25

## 2018-07-08 RX ADMIN — IPRATROPIUM BROMIDE AND ALBUTEROL SULFATE 3 ML: 2.5; .5 SOLUTION RESPIRATORY (INHALATION) at 07:29

## 2018-07-08 RX ADMIN — LACTULOSE 20 G: 20 SOLUTION ORAL at 09:07

## 2018-07-08 RX ADMIN — BUDESONIDE 0.5 MG: 0.5 INHALANT RESPIRATORY (INHALATION) at 20:59

## 2018-07-08 RX ADMIN — ZINC 1 TABLET: TAB ORAL at 09:08

## 2018-07-08 RX ADMIN — PANTOPRAZOLE SODIUM 40 MG: 40 TABLET, DELAYED RELEASE ORAL at 05:30

## 2018-07-08 RX ADMIN — BISACODYL 10 MG: 10 SUPPOSITORY RECTAL at 09:07

## 2018-07-08 RX ADMIN — IPRATROPIUM BROMIDE AND ALBUTEROL SULFATE 3 ML: 2.5; .5 SOLUTION RESPIRATORY (INHALATION) at 14:19

## 2018-07-08 RX ADMIN — FERROUS SULFATE TAB 325 MG (65 MG ELEMENTAL FE) 325 MG: 325 (65 FE) TAB at 09:06

## 2018-07-08 RX ADMIN — FUROSEMIDE 20 MG: 10 INJECTION, SOLUTION INTRAVENOUS at 06:24

## 2018-07-08 RX ADMIN — INSULIN LISPRO 2 UNITS: 100 INJECTION, SOLUTION INTRAVENOUS; SUBCUTANEOUS at 16:32

## 2018-07-08 RX ADMIN — IPRATROPIUM BROMIDE AND ALBUTEROL SULFATE 3 ML: 2.5; .5 SOLUTION RESPIRATORY (INHALATION) at 20:59

## 2018-07-08 RX ADMIN — PRAVASTATIN SODIUM 40 MG: 40 TABLET ORAL at 16:31

## 2018-07-08 RX ADMIN — INSULIN LISPRO 2 UNITS: 100 INJECTION, SOLUTION INTRAVENOUS; SUBCUTANEOUS at 13:04

## 2018-07-08 RX ADMIN — PREDNISONE 5 MG: 5 TABLET ORAL at 09:06

## 2018-07-08 RX ADMIN — FUROSEMIDE 20 MG: 20 TABLET ORAL at 16:31

## 2018-07-08 RX ADMIN — INSULIN GLARGINE 18 UNITS: 100 INJECTION, SOLUTION SUBCUTANEOUS at 21:25

## 2018-07-08 RX ADMIN — FUROSEMIDE 20 MG: 20 TABLET ORAL at 13:13

## 2018-07-08 NOTE — PROGRESS NOTES
Progress Note - Katherine Sifuentes 80 y o  male MRN: [id number]    Unit/Bed#: [address] [place] [address] 202-01 Encounter: [id number]      Assessment:  Principal Problem:    Acute erosive gastritis  Active Problems:    Acute kidney injury ([address] )    Acute on chronic systolic congestive heart failure (HCC)    Acute blood loss anemia    Chronic respiratory failure with hypoxia (HCC)    COPD (chronic obstructive pulmonary disease) (HCC)    ATN (acute tubular necrosis) (HCC)    Constipation    Chronic kidney disease, stage 3    Type 2 diabetes mellitus with stage 3 chronic kidney disease, without long-term current use of insulin (HCC)    Hypertension    Contusion of flank    Hyperlipidemia    Cancer (HCC)    Parenchymal renal hypertension    Benign prostatic hyperplasia with urinary retention    Acute pain of left shoulder  Resolved Problems:    * No resolved hospital problems  *        Plan:  · GI bleed with acute blood loss anemia due to acute gastritis-now stable  · CHF with some improvement today with diuresis on IV diuretics-will switch to p  O  and hope to have stable for discharge tomorrow  Patient resides in assisted living but may need short-term rehab  · COPD-continue usual inhalers  · Acute kidney injury appears to be stable with chronic kidney disease stage 3-creatinine 1 9 today  · Constipation-after fleets , dulcolax  and laxatives finally had several large bowel movements last night and feels better    Subjective:   Patient reports somewhat less shortness of breath-will get out of bed to chair today  Patient relates less abdominal pain since his constipation was addressed  ROS  Comprehensive system review negative other than noted above    Objective:     Vitals: Blood pressure 139/63, pulse 80, temperature 98 8 °F (37 1 °C), temperature source Oral, resp  rate 18, height 5' 6" (1 676 m), weight 87 5 kg (192 lb 14 4 oz), SpO2 96 %  ,Body mass index is 31 14 kg/m²    Current Facility-Administered Medications   Medication Dose Route Frequency Provider Last Rate Last Dose    acetaminophen (TYLENOL) tablet 650 mg  650 mg Oral Q6H PRN Neha Huang MD   650 mg at 07/01/18 2016    albuterol inhalation solution 2 5 mg  2 5 mg Nebulization Q6H PRN Neha Huang MD   2 5 mg at 07/01/18 1954    bisacodyl (DULCOLAX) rectal suppository 10 mg  10 mg Rectal Daily Ernie Montes MD   10 mg at 07/08/18 0907    budesonide (PULMICORT) inhalation solution 0 5 mg  0 5 mg Nebulization BID Neha Huang MD   0 5 mg at 07/08/18 0729    ferrous sulfate tablet 325 mg  325 mg Oral Daily With Breakfast Mary Kate Joseph DO   325 mg at 07/08/18 4005    furosemide (LASIX) injection 20 mg  20 mg Intravenous TID (diuretic) Ho tSuart MD   20 mg at 07/08/18 0624    HYDROcodone-acetaminophen (NORCO) 5-325 mg per tablet 1 tablet  1 tablet Oral Q6H PRN Neha Huang MD   1 tablet at 07/03/18 1120    insulin glargine (LANTUS) subcutaneous injection 18 Units 0 18 mL  18 Units Subcutaneous HS Ho Stuart MD   18 Units at 07/07/18 2120    insulin lispro (HumaLOG) 100 units/mL subcutaneous injection 1-5 Units  1-5 Units Subcutaneous HS ROMAINE Dillon   2 Units at 07/06/18 2114    insulin lispro (HumaLOG) 100 units/mL subcutaneous injection 1-6 Units  1-6 Units Subcutaneous TID AC Neha Huang MD   3 Units at 07/07/18 1813    insulin lispro (HumaLOG) 100 units/mL subcutaneous injection 5 Units  5 Units Subcutaneous TID With Meals Ho Stuart MD   5 Units at 07/08/18 0902    ipratropium-albuterol (DUO-NEB) 0 5-2 5 mg/3 mL inhalation solution 3 mL  3 mL Nebulization TID Robyn King DO   3 mL at 07/08/18 0729    lactulose 20 g/30 mL oral solution 20 g  20 g Oral Daily Ho Stuatr MD   20 g at 07/08/18 0907    latanoprost (XALATAN) 0 005 % ophthalmic solution 1 drop  1 drop Both Eyes HS Neha Huang MD   1 drop at 07/07/18 2120    menthol-methyl salicylate (BENGAY) 09-23 % cream   Apply externally 4x Daily PRN Bessy Smack, ROMAINE   1 application at 12/15/85 2008    metoprolol succinate (TOPROL-XL) 24 hr tablet 12 5 mg  12 5 mg Oral Daily Mary Kate Fly, DO   12 5 mg at 18 0907    multivitamin stress formula tablet 1 tablet  1 tablet Oral Daily Mary Kate Fly, DO   1 tablet at 18 0908    ondansetron (ZOFRAN) injection 4 mg  4 mg Intravenous Q6H PRN Ramy Morfin MD        pantoprazole (PROTONIX) EC tablet 40 mg  40 mg Oral Early Morning Kieran Mayfield MD   40 mg at 18 0530    polyethylene glycol (MIRALAX) packet 17 g  17 g Oral BID PRN ROMAINE Qureshi        pravastatin (PRAVACHOL) tablet 40 mg  40 mg Oral Daily With Judson Rodriges MD   40 mg at 18 1815    predniSONE tablet 5 mg  5 mg Oral Daily With Breakfast Mirna Tanner MD   5 mg at 18 0906    senna (SENOKOT) tablet 8 6 mg  1 tablet Oral HS Mirna Tanner MD   8 6 mg at 18 2120    sodium phosphate-biphosphate (FLEET) enema 1 enema  1 enema Rectal Daily PRN Clovia Clines, DO   1 enema at 18 1601    traMADol (ULTRAM) tablet 50 mg  50 mg Oral Q6H PRN Mary Kate Fly, DO   50 mg at 18 1735     Prescriptions Prior to Admission   Medication    acetaminophen (TYLENOL) 650 mg suppository    albuterol (2 5 mg/3 mL) 0 083 % nebulizer solution    aspirin 81 MG tablet    budesonide (PULMICORT) 0 5 mg/2 mL nebulizer solution    docusate sodium (COLACE) 100 mg capsule    famotidine (PEPCID) 20 mg tablet    furosemide (LASIX) 40 mg tablet    glimepiride (AMARYL) 1 mg tablet    latanoprost (XALATAN) 0 005 % ophthalmic solution    metolazone (ZAROXOLYN) 5 mg tablet    metoprolol succinate (TOPROL-XL) 25 mg 24 hr tablet    polyethylene glycol (MIRALAX) 17 g packet    [] predniSONE 10 mg tablet    senna (SENOKOT) 8 6 MG tablet    simvastatin (ZOCOR) 20 mg tablet    sodium chloride (OCEAN) 0 65 % nasal spray    spironolactone (ALDACTONE) 25 mg tablet    umeclidinium-vilanterol (ANORO ELLIPTA) 62 5-25 MCG/INH inhaler    acetaminophen-codeine (TYLENOL #3) 300-30 mg per tablet    cephalexin (KEFLEX) 500 mg capsule         Intake/Output Summary (Last 24 hours) at 07/08/18 1046  Last data filed at 07/08/18 0501   Gross per 24 hour   Intake                0 ml   Output             1223 ml   Net            -1223 ml       Physical Exam:  General appearance: cooperative  Neck: no adenopathy, no JVD, supple, symmetrical, trachea midline and thyroid not enlarged, symmetric, no tenderness/mass/nodules  Lungs: Minimal basilar crackles  No wheezes but generally decreased breath sounds  Heart: regular rate and rhythm  Abdomen:  Generalized fullness but no tenderness or guarding or rigidity  Extremities: edema Trace bilateral ankle  Skin: Skin color, texture, turgor normal  No rashes or lesions  Neurologic:  Normal muscle tone  Mild forgetfulness       Lab, Imaging and other studies: I have personally reviewed pertinent reports  Results from last 7 days  Lab Units 07/08/18  0452 07/07/18  0442 07/06/18  0205  07/03/18  0459 07/02/18  0502   WBC Thousand/uL 12 43* 9 62 9 78  < > 11 06* 9 58   HEMOGLOBIN g/dL 8 1* 7 9* 7 7*  < > 8 0* 7 6*   HEMATOCRIT % 27 6* 26 6* 25 3*  < > 26 3* 24 4*   PLATELETS Thousands/uL 243 211 203  < > 206 216   NEUTROS PCT %  --   --   --   --  83* 81*   LYMPHS PCT %  --   --   --   --  6* 9*   MONOS PCT %  --   --   --   --  9 8   EOS PCT %  --   --   --   --  1 1   < > = values in this interval not displayed      Results from last 7 days  Lab Units 07/08/18  0452 07/07/18  0442 07/06/18  0205  07/02/18  0503   SODIUM mmol/L 145 145 141  < > 142   POTASSIUM mmol/L 3 6 4 1 3 9  < > 3 9   CHLORIDE mmol/L 104 103 102  < > 102   CO2 mmol/L 34* 36* 30  < > 32   BUN mg/dL 71* 80* 90*  < > 99*   CREATININE mg/dL 1 92* 2 05* 2 25*  < > 2 56*   CALCIUM mg/dL 8 2* 8 3 8 4  < > 7 8*   TOTAL PROTEIN g/dL  --   --  6 7  --  6 2*   BILIRUBIN TOTAL mg/dL  --   --  0 40  --  0 80   ALK PHOS U/L  --   --  63  --  53   ALT U/L  --   --  23  --  20   AST U/L  --   --  25  --  22   GLUCOSE RANDOM mg/dL 116 166* 142*  < > 135   < > = values in this interval not displayed  Lab Results   Component Value Date    TROPONINI 0 09 (H) 07/03/2018    TROPONINI 0 08 (H) 07/03/2018    TROPONINI 0 10 (H) 07/03/2018    CKTOTAL 56 07/01/2018         Lab Results   Component Value Date    SPUTUMCULTUR 3+ Growth of Oxidase Positive gram negative darlin (A) 07/05/2018    SPUTUMCULTUR 3+ Growth of  07/05/2018       Imaging:  Results for orders placed during the hospital encounter of 07/01/18   XR chest portable    Narrative CHEST     INDICATION:   Shortness of breath  Congestive failure       COMPARISON:  None    EXAM PERFORMED/VIEWS:  XR CHEST PORTABLE      FINDINGS:  There are median sternotomy wires indicating prior cardiac surgery  Dual-lead left chest pacemaker is noted prosthetic cardiac valve is seen  Heart is enlarged  Atherosclerotic vascular calcifications are noted  Mild pulmonary vascular congestive changes  Left lung base is poorly evaluated there appears to be a left pleural effusion  Trace costophrenic angle effusion on the right  Relative increased parenchymal density in the left perihilar region of the right lower lobe raises the suspicion of either   atelectasis or pneumonia  Osseous structures appear within normal limits for patient age  Impression Cardiomegaly  Mild pulmonary vascular congestive changes  Left greater than right pleural effusions  Relative increased parenchymal density in the left perihilar region and right lower lobe could represent atelectasis or small patchy areas of   pneumonia  Workstation performed: [id number]       Results for orders placed during the hospital encounter of 07/01/18   XR chest pa & lateral    Narrative CHEST     INDICATION:   sob  COMPARISON:  7/3/2018    EXAM PERFORMED/VIEWS:  XR CHEST PA & LATERAL      FINDINGS:  There is a left-sided pacer      Heart shadow is enlarged but unchanged from prior exam     There is pulmonary vascular congestion with left greater than right effusions  Right basilar patchy opacity has improved, likely atelectasis  Osseous structures appear within normal limits for patient age  Impression Stable congestion with small effusions  Improved right basilar opacity, likely atelectasis  Workstation performed: [id number]         PATIENT CENTERED ROUNDS: I have performed rounds with the nursing staff            Bora Brennan DO

## 2018-07-08 NOTE — PROGRESS NOTES
NEPHROLOGY PROGRESS NOTE   Cristobal Rueda 80 y o  male MRN: [id number]  Unit/Bed#: [address] [place] [address] 202-01 Encounter: [id number]  Reason for Consult:  Acute kidney injury    ASSESSMENT/PLAN:  1  Acute kidney injury, previously secondary to ATN, postobstructive now possible component of cardiorenal syndrome  2  Chronic kidney disease, baseline creatinine approximately 1 5  3  Acute on chronic anemia, status post PRBC transfusion, EGD shows gastritis  4  Constipation, appears to be resolving  5  Cardiomyopathy, ejection fraction of 30-35% currently on IV diuresis  6  History of urinary retention, Moralez catheter removed, continue bladder scans Q shift  7  Transient hypotension, overall improving    PLAN:  · Overall renal function seems to be slightly improved, now 1 9  · Continue with IV diuretics, hopeful transition in the next 48 hours  · Blood pressure stable  · Hemoglobin stable    SUBJECTIVE:  Seen and examined  Patient awake alert  Overall is doing slightly better, less abdominal pain  Constipation seems to be improving  Denies any chest pain or shortness of breath at rest     Review of Systems    OBJECTIVE:  Current Weight: Weight - Scale: 86 3 kg (190 lb 4 1 oz)  Vitals:    07/08/18 0749 07/08/18 0915 07/08/18 1110 07/08/18 1501   BP: 139/63   118/58   BP Location: Right arm   Right arm   Pulse: 80   87   Resp: 18   18   Temp: 98 8 °F (37 1 °C)   98 2 °F (36 8 °C)   TempSrc: Oral   Oral   SpO2: 96% 96%  92%   Weight:   86 3 kg (190 lb 4 1 oz)    Height:           Intake/Output Summary (Last 24 hours) at 07/08/18 1514  Last data filed at 07/08/18 1403   Gross per 24 hour   Intake                0 ml   Output             1648 ml   Net            -1648 ml       Physical Exam   Constitutional: No distress  Eyes: No scleral icterus  Neck: Neck supple  Cardiovascular: Normal rate and regular rhythm  Pulmonary/Chest: Effort normal  He has rales (Bilateral basilar rales)  Abdominal: Soft  He exhibits no distension  Musculoskeletal: He exhibits no edema  Neurological: He is alert  Skin: Skin is warm and dry         Medications:    Current Facility-Administered Medications:     acetaminophen (TYLENOL) tablet 650 mg, 650 mg, Oral, Q6H PRN, Shirlee Gitelman, MD, 650 mg at 07/01/18 2016    albuterol inhalation solution 2 5 mg, 2 5 mg, Nebulization, Q6H PRN, Shirlee Gitelman, MD, 2 5 mg at 07/01/18 1954    bisacodyl (DULCOLAX) rectal suppository 10 mg, 10 mg, Rectal, Daily, Mckay Farr MD, 10 mg at 07/08/18 0907    budesonide (PULMICORT) inhalation solution 0 5 mg, 0 5 mg, Nebulization, BID, Shirlee Gitelman, MD, 0 5 mg at 07/08/18 0729    ferrous sulfate tablet 325 mg, 325 mg, Oral, Daily With Breakfast, Mary Kate Fly, DO, 325 mg at 07/08/18 0906    furosemide (LASIX) tablet 20 mg, 20 mg, Oral, BID (diuretic), Mary Kate Fly, DO, 20 mg at 07/08/18 1313    HYDROcodone-acetaminophen (NORCO) 5-325 mg per tablet 1 tablet, 1 tablet, Oral, Q6H PRN, Shirlee Gitelman, MD, 1 tablet at 07/03/18 1120    insulin glargine (LANTUS) subcutaneous injection 18 Units 0 18 mL, 18 Units, Subcutaneous, HS, Denise Helms MD, 18 Units at 07/07/18 2120    insulin lispro (HumaLOG) 100 units/mL subcutaneous injection 1-5 Units, 1-5 Units, Subcutaneous, HS, ROMAINE Dillon, 2 Units at 07/06/18 2114    insulin lispro (HumaLOG) 100 units/mL subcutaneous injection 1-6 Units, 1-6 Units, Subcutaneous, TID AC, 2 Units at 07/08/18 1304 **AND** Fingerstick Glucose (POCT), , , TID AC, Shirlee Gitelman, MD    insulin lispro (HumaLOG) 100 units/mL subcutaneous injection 5 Units, 5 Units, Subcutaneous, TID With Meals, Denise Helms MD, 5 Units at 07/08/18 1305    ipratropium-albuterol (DUO-NEB) 0 5-2 5 mg/3 mL inhalation solution 3 mL, 3 mL, Nebulization, TID, Chandan Dodd DO, 3 mL at 07/08/18 1419    lactulose 20 g/30 mL oral solution 20 g, 20 g, Oral, Daily, Denise Helms MD, 20 g at 07/08/18 0907    latanoprost (XALATAN) 0 005 % ophthalmic solution 1 drop, 1 drop, Both Eyes, HS, Rosio Kinsey MD, 1 drop at 07/07/18 2120    menthol-methyl salicylate (BENGAY) 82-65 % cream, , Apply externally, 4x Daily PRN, ROMAINE Sullivan, 1 application at 16/98/38 2008    metoprolol succinate (TOPROL-XL) 24 hr tablet 12 5 mg, 12 5 mg, Oral, Daily, Mary Kate Fly, DO, 12 5 mg at 07/08/18 0907    multivitamin stress formula tablet 1 tablet, 1 tablet, Oral, Daily, Mary Kate Fly, DO, 1 tablet at 07/08/18 0908    ondansetron (ZOFRAN) injection 4 mg, 4 mg, Intravenous, Q6H PRN, Rosio Kinsey MD    pantoprazole (PROTONIX) EC tablet 40 mg, 40 mg, Oral, Early Morning, Zeke Parker MD, 40 mg at 07/08/18 0530    polyethylene glycol (MIRALAX) packet 17 g, 17 g, Oral, BID PRN, ROMAINE Marques    pravastatin (PRAVACHOL) tablet 40 mg, 40 mg, Oral, Daily With Jose Barker MD, 40 mg at 07/07/18 1815    predniSONE tablet 5 mg, 5 mg, Oral, Daily With Breakfast, Mikayla Justice MD, 5 mg at 07/08/18 0906    senna (SENOKOT) tablet 8 6 mg, 1 tablet, Oral, HS, Mikayla Justice MD, 8 6 mg at 07/07/18 2120    sodium phosphate-biphosphate (FLEET) enema 1 enema, 1 enema, Rectal, Daily PRN, Brooks Espinoza DO, 1 enema at 07/07/18 1601    traMADol (ULTRAM) tablet 50 mg, 50 mg, Oral, Q6H PRN, Brooks Espinoza DO, 50 mg at 07/03/18 1735    Laboratory Results:    Results from last 7 days  Lab Units 07/08/18  0452 07/07/18  0442 07/06/18  0205 07/05/18  5779 07/04/18  0432 07/03/18  0722 07/03/18  0459 07/02/18  0503 07/02/18  0502   WBC Thousand/uL 12 43* 9 62 9 78  --  11 05*  --  11 06*  --  9 58   HEMOGLOBIN g/dL 8 1* 7 9* 7 7* 7 5* 7 8*  --  8 0*  --  7 6*   HEMATOCRIT % 27 6* 26 6* 25 3* 24 8* 26 1*  --  26 3*  --  24 4*   PLATELETS Thousands/uL 243 211 203  --  207  --  206  --  216   SODIUM mmol/L 145 145 141 140 141 142  --  142  --    POTASSIUM mmol/L 3 6 4 1 3 9 4 1 4 3 3 8  --  3 9  --    CHLORIDE mmol/L 104 103 102 102 103 104  --  102  --    CO2 mmol/L 34* 36* 30 31 32 33*  --  32  --    BUN mg/dL 71* 80* 90* 90* 86* 93*  --  99*  --    CREATININE mg/dL 1 92* 2 05* 2 25* 2 08* 2 21* 2 54*  --  2 56*  --    CALCIUM mg/dL 8 2* 8 3 8 4 8 3 7 9* 7 9*  --  7 8*  --    MAGNESIUM mg/dL  --   --   --   --  2 5  --   --  2 3  --    PHOSPHORUS mg/dL  --   --   --   --   --   --   --  4 4*  --    TOTAL PROTEIN g/dL  --   --  6 7  --   --   --   --  6 2*  --    GLUCOSE RANDOM mg/dL 116 166* 142* 162* 201* 159*  --  135  --

## 2018-07-08 NOTE — PROGRESS NOTES
Pt observed to be sleeping only short intervals overnight with q2hr incont care/turning  Denies pain/dyspnea

## 2018-07-08 NOTE — PROGRESS NOTES
Jackie Miller  [id number]    73 y o   male      ASSESSMENT  1   Symptomatic anemia with melena   EGD 7/2 showed erosive gastritis and a small hiatal hernia  + intestinal metaplasia of stomach  H P negative   No overt bleeding site identified  Ricci Peña remains stable or actually up slightly at 8 1 today   2   Acute anemia probably acute blood loss anemia  3   GERD on Pepcid  4   Distant history of iron deficiency anemia and occult GI bleeding   S/P EGD/colonoscopy/capsule endoscopy 2007   Treated with a year course of oral iron  5   Distant history of peptic ulcer disease  6   Distant history of colon cancer  7   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  with congestive heart failure followed by Dr Chavira   8   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  9   H/O AAA  10   DM    PLAN  1   Follow H&H and transfuse as needed  2   Watch for overt GI blood loss  3   Continue Protonix  4  Consider colonoscopy if hemoglobin drops further- but this point I do not think this is something he could tolerate due to age and comorbidities               SUBJECTIVE/HPI all comfortable sitting in the chair eating small to moderate amounts according to the family  No abdominal pain      /63 (BP Location: Right arm)   Pulse 80   Temp 98 8 °F (37 1 °C) (Oral)   Resp 18   Ht 5' 6" (1 676 m)   Wt 86 3 kg (190 lb 4 1 oz)   SpO2 96%   BMI 30 71 kg/m²       Physical Exam:  Or no acute distress abdomen distended soft and nontender     Lab Results   Component Value Date    GLUCOSE 116 07/08/2018    CALCIUM 8 2 (L) 07/08/2018     07/08/2018    K 3 6 07/08/2018    CO2 34 (H) 07/08/2018     07/08/2018    BUN 71 (H) 07/08/2018    CREATININE 1 92 (H) 07/08/2018     Lab Results   Component Value Date    WBC 12 43 (H) 07/08/2018    HGB 8 1 (L) 07/08/2018    HCT 27 6 (L) 07/08/2018    MCV 92 07/08/2018     07/08/2018     Lab Results   Component Value Date    ALT 23 07/06/2018    AST 25 07/06/2018    ALKPHOS 63 07/06/2018    BILITOT 0 40 07/06/2018     No components found for: AMYLJKJJJASE  No results found for: LIPASE  Lab Results   Component Value Date    IRON 18 (L) 07/02/2018    TIBC 423 07/02/2018    FERRITIN 47 07/02/2018     Lab Results   Component Value Date    INR 1 17 07/01/2018       Total floor / unit time spent today 20 minutes  Greater than 50% of total time was spent with the patient and / or family counseling and / or coordination of care   A description of the counseling / coordination of care:  20 minutes    Ana Woodard MD

## 2018-07-09 ENCOUNTER — APPOINTMENT (INPATIENT)
Dept: CT IMAGING | Facility: HOSPITAL | Age: 83
DRG: 377 | End: 2018-07-09
Payer: COMMERCIAL

## 2018-07-09 ENCOUNTER — APPOINTMENT (INPATIENT)
Dept: RADIOLOGY | Facility: HOSPITAL | Age: 83
DRG: 377 | End: 2018-07-09
Payer: COMMERCIAL

## 2018-07-09 LAB
ANION GAP SERPL CALCULATED.3IONS-SCNC: 7 MMOL/L (ref 4–13)
BASOPHILS # BLD AUTO: 0.02 THOUSANDS/ΜL (ref 0–0.1)
BASOPHILS NFR BLD AUTO: 0 % (ref 0–1)
BUN SERPL-MCNC: 76 MG/DL (ref 5–25)
CALCIUM SERPL-MCNC: 7.9 MG/DL (ref 8.3–10.1)
CHLORIDE SERPL-SCNC: 101 MMOL/L (ref 100–108)
CO2 SERPL-SCNC: 34 MMOL/L (ref 21–32)
CREAT SERPL-MCNC: 2.5 MG/DL (ref 0.6–1.3)
EOSINOPHIL # BLD AUTO: 0.22 THOUSAND/ΜL (ref 0–0.61)
EOSINOPHIL NFR BLD AUTO: 2 % (ref 0–6)
ERYTHROCYTE [DISTWIDTH] IN BLOOD BY AUTOMATED COUNT: 16 % (ref 11.6–15.1)
GFR SERPL CREATININE-BSD FRML MDRD: 22 ML/MIN/1.73SQ M
GLUCOSE SERPL-MCNC: 137 MG/DL (ref 65–140)
GLUCOSE SERPL-MCNC: 151 MG/DL (ref 65–140)
GLUCOSE SERPL-MCNC: 154 MG/DL (ref 65–140)
GLUCOSE SERPL-MCNC: 194 MG/DL (ref 65–140)
GLUCOSE SERPL-MCNC: 92 MG/DL (ref 65–140)
HCT VFR BLD AUTO: 27.2 % (ref 36.5–49.3)
HGB BLD-MCNC: 8.1 G/DL (ref 12–17)
IMM GRANULOCYTES # BLD AUTO: 0.07 THOUSAND/UL (ref 0–0.2)
IMM GRANULOCYTES NFR BLD AUTO: 1 % (ref 0–2)
LYMPHOCYTES # BLD AUTO: 0.55 THOUSANDS/ΜL (ref 0.6–4.47)
LYMPHOCYTES NFR BLD AUTO: 5 % (ref 14–44)
MCH RBC QN AUTO: 27.1 PG (ref 26.8–34.3)
MCHC RBC AUTO-ENTMCNC: 29.8 G/DL (ref 31.4–37.4)
MCV RBC AUTO: 91 FL (ref 82–98)
MONOCYTES # BLD AUTO: 0.87 THOUSAND/ΜL (ref 0.17–1.22)
MONOCYTES NFR BLD AUTO: 8 % (ref 4–12)
NEUTROPHILS # BLD AUTO: 9.41 THOUSANDS/ΜL (ref 1.85–7.62)
NEUTS SEG NFR BLD AUTO: 84 % (ref 43–75)
NRBC BLD AUTO-RTO: 0 /100 WBCS
PLATELET # BLD AUTO: 237 THOUSANDS/UL (ref 149–390)
PMV BLD AUTO: 10.8 FL (ref 8.9–12.7)
POTASSIUM SERPL-SCNC: 3.8 MMOL/L (ref 3.5–5.3)
RBC # BLD AUTO: 2.99 MILLION/UL (ref 3.88–5.62)
SODIUM SERPL-SCNC: 142 MMOL/L (ref 136–145)
WBC # BLD AUTO: 11.14 THOUSAND/UL (ref 4.31–10.16)

## 2018-07-09 PROCEDURE — 94760 N-INVAS EAR/PLS OXIMETRY 1: CPT

## 2018-07-09 PROCEDURE — 74176 CT ABD & PELVIS W/O CONTRAST: CPT

## 2018-07-09 PROCEDURE — 80048 BASIC METABOLIC PNL TOTAL CA: CPT | Performed by: PHYSICIAN ASSISTANT

## 2018-07-09 PROCEDURE — 85025 COMPLETE CBC W/AUTO DIFF WBC: CPT | Performed by: PHYSICIAN ASSISTANT

## 2018-07-09 PROCEDURE — 99232 SBSQ HOSP IP/OBS MODERATE 35: CPT | Performed by: INTERNAL MEDICINE

## 2018-07-09 PROCEDURE — 71046 X-RAY EXAM CHEST 2 VIEWS: CPT

## 2018-07-09 PROCEDURE — 94640 AIRWAY INHALATION TREATMENT: CPT

## 2018-07-09 PROCEDURE — 82948 REAGENT STRIP/BLOOD GLUCOSE: CPT

## 2018-07-09 RX ADMIN — INSULIN LISPRO 1 UNITS: 100 INJECTION, SOLUTION INTRAVENOUS; SUBCUTANEOUS at 08:59

## 2018-07-09 RX ADMIN — INSULIN LISPRO 2 UNITS: 100 INJECTION, SOLUTION INTRAVENOUS; SUBCUTANEOUS at 12:33

## 2018-07-09 RX ADMIN — FUROSEMIDE 20 MG: 20 TABLET ORAL at 08:58

## 2018-07-09 RX ADMIN — IPRATROPIUM BROMIDE AND ALBUTEROL SULFATE 3 ML: 2.5; .5 SOLUTION RESPIRATORY (INHALATION) at 08:07

## 2018-07-09 RX ADMIN — PANTOPRAZOLE SODIUM 40 MG: 40 TABLET, DELAYED RELEASE ORAL at 05:09

## 2018-07-09 RX ADMIN — PRAVASTATIN SODIUM 40 MG: 40 TABLET ORAL at 15:43

## 2018-07-09 RX ADMIN — LACTULOSE 20 G: 20 SOLUTION ORAL at 08:58

## 2018-07-09 RX ADMIN — FERROUS SULFATE TAB 325 MG (65 MG ELEMENTAL FE) 325 MG: 325 (65 FE) TAB at 08:58

## 2018-07-09 RX ADMIN — BUDESONIDE 0.5 MG: 0.5 INHALANT RESPIRATORY (INHALATION) at 19:39

## 2018-07-09 RX ADMIN — IPRATROPIUM BROMIDE AND ALBUTEROL SULFATE 3 ML: 2.5; .5 SOLUTION RESPIRATORY (INHALATION) at 14:03

## 2018-07-09 RX ADMIN — HYDROCODONE BITARTRATE AND ACETAMINOPHEN 1 TABLET: 5; 325 TABLET ORAL at 16:03

## 2018-07-09 RX ADMIN — ZINC 1 TABLET: TAB ORAL at 09:00

## 2018-07-09 RX ADMIN — IPRATROPIUM BROMIDE AND ALBUTEROL SULFATE 3 ML: 2.5; .5 SOLUTION RESPIRATORY (INHALATION) at 19:39

## 2018-07-09 RX ADMIN — INSULIN LISPRO 1 UNITS: 100 INJECTION, SOLUTION INTRAVENOUS; SUBCUTANEOUS at 18:04

## 2018-07-09 RX ADMIN — BUDESONIDE 0.5 MG: 0.5 INHALANT RESPIRATORY (INHALATION) at 08:07

## 2018-07-09 RX ADMIN — SENNOSIDES 8.6 MG: 8.6 TABLET, FILM COATED ORAL at 21:09

## 2018-07-09 RX ADMIN — LEVOFLOXACIN 250 MG: 250 TABLET, FILM COATED ORAL at 15:44

## 2018-07-09 RX ADMIN — INSULIN GLARGINE 18 UNITS: 100 INJECTION, SOLUTION SUBCUTANEOUS at 21:21

## 2018-07-09 RX ADMIN — LATANOPROST 1 DROP: 50 SOLUTION OPHTHALMIC at 21:09

## 2018-07-09 RX ADMIN — PREDNISONE 5 MG: 5 TABLET ORAL at 08:58

## 2018-07-09 NOTE — PROGRESS NOTES
Progress Note - Mitchel Bello 80 y o  male MRN: [id number]  Unit/Bed#: [address] [place] [address] 202-01 Encounter: [id number]    Assessment:  Principal Problem:    Acute erosive gastritis  Active Problems:    Acute kidney injury ([address] )    Chronic kidney disease, stage 3    Type 2 diabetes mellitus with stage 3 chronic kidney disease, without long-term current use of insulin (HCC)    Acute on chronic systolic congestive heart failure (HCC)    Acute blood loss anemia    Chronic respiratory failure with hypoxia (HCC)    Hypertension    COPD (chronic obstructive pulmonary disease) (HCC)    Contusion of flank    Hyperlipidemia    Cancer (HCC)    ATN (acute tubular necrosis) (HCC)    Parenchymal renal hypertension    Benign prostatic hyperplasia with urinary retention    Acute pain of left shoulder    Constipation  Resolved Problems:    * No resolved hospital problems  *      Plan:  · GI bleed with acute blood loss anemia due to acute gastritis  H&H is stable  GI follow-up noted  Continue on Protonix  · Acute kidney injury in chronic kidney disease stage 3/4  Patient baseline creatinine is 1 5  Will hold Lasix  Nephrology follow-up  Avoid hypotension/nephrotoxins medication  · Acute on chronic systolic heart failure and hypertension  Daily weight and I&Os  Will hold Lasix secondary to worsening kidney function  Continue on statin, Toprol-XL  · Diabetes mellitus  Continue on Lantus insulin  Accu-Chek a c  HS with Humalog sliding scale  · BPH-on Flomax  · DVT prophylaxis  · Discussed with patient and his son at length  Subjective:   Patient is seen and examined at bedside  As per son patient did not eat his breakfast this morning and has been feeling very tired and weak this morning  Denies any new complaints  Afebrile  All other ROS are negative  Objective:   Vitals: Blood pressure 106/59, pulse 58, temperature 98 4 °F (36 9 °C), temperature source Oral, resp   rate 20, height 5' 6" (1 676 m), weight 82 6 kg (182 lb 1 6 oz), SpO2 98 %  ,Body mass index is 29 39 kg/m²  SPO2 RA Rest      ED to Hosp-Admission (Current) from 7/1/2018 in [facility]   SpO2  98 %   SpO2 Activity  At Rest   O2 Device  Nasal cannula   O2 Flow Rate  2 L/min        I&O:   Intake/Output Summary (Last 24 hours) at 07/09/18 1229  Last data filed at 07/09/18 0332   Gross per 24 hour   Intake                0 ml   Output              613 ml   Net             -613 ml       Physical Exam:    General- Alert, sitting in chair  Not in any acute distress  HEENT- ARNOLDO, EOM intact  Neck- Supple, No JVD  CVS- regular, S1 and S2 normal   Chest- Bilateral Air entry, No rhochi, crackles or wheezing present  Abdomen- soft, nontender, not distended, no guarding or rigidity, BS+  Extremities-  No pedal edema, No calf tenderness                         Normal ROM in all extremities      Invasive Devices     Peripheral Intravenous Line            Peripheral IV 07/09/18 Left Arm less than 1 day          Drain            External Urinary Catheter Large 1 day                      Social History  reviewed  Family History   Problem Relation Age of Onset    COPD Father     reviewed    Meds:  Current Facility-Administered Medications   Medication Dose Route Frequency Provider Last Rate Last Dose    acetaminophen (TYLENOL) tablet 650 mg  650 mg Oral Q6H PRN Hood Simms MD   650 mg at 07/01/18 2016    albuterol inhalation solution 2 5 mg  2 5 mg Nebulization Q6H PRN Hood Simms MD   2 5 mg at 07/01/18 1954    budesonide (PULMICORT) inhalation solution 0 5 mg  0 5 mg Nebulization BID Hood Simms MD   0 5 mg at 07/09/18 0807    ferrous sulfate tablet 325 mg  325 mg Oral Daily With Breakfast Mary Kate Fly, DO   325 mg at 07/09/18 0858    furosemide (LASIX) tablet 20 mg  20 mg Oral BID (diuretic) Mary Kate Fly, DO   20 mg at 07/09/18 0858    HYDROcodone-acetaminophen (NORCO) 5-325 mg per tablet 1 tablet  1 tablet Oral Q6H PRN Hood Simms MD 1 tablet at 07/03/18 1120    insulin glargine (LANTUS) subcutaneous injection 18 Units 0 18 mL  18 Units Subcutaneous HS Edd Mccain MD   18 Units at 07/08/18 2125    insulin lispro (HumaLOG) 100 units/mL subcutaneous injection 1-5 Units  1-5 Units Subcutaneous HS ROMAINE Dillon   2 Units at 07/06/18 2114    insulin lispro (HumaLOG) 100 units/mL subcutaneous injection 1-6 Units  1-6 Units Subcutaneous TID AC Berenice Mathew MD   1 Units at 07/09/18 0859    insulin lispro (HumaLOG) 100 units/mL subcutaneous injection 5 Units  5 Units Subcutaneous TID With Meals Edd Mccain MD   5 Units at 07/09/18 0858    ipratropium-albuterol (DUO-NEB) 0 5-2 5 mg/3 mL inhalation solution 3 mL  3 mL Nebulization TID Ezra Valladares DO   3 mL at 07/09/18 0807    lactulose 20 g/30 mL oral solution 20 g  20 g Oral Daily Edd Mccain MD   20 g at 07/09/18 0858    latanoprost (XALATAN) 0 005 % ophthalmic solution 1 drop  1 drop Both Eyes HS Berenice Mathew MD   1 drop at 07/08/18 2125    levofloxacin (LEVAQUIN) tablet 250 mg  250 mg Oral Q24H Mary Kate Fly, DO   250 mg at 07/08/18 1631    menthol-methyl salicylate (BENGAY) 68-00 % cream   Apply externally 4x Daily PRN ROMAINE Ruano   1 application at 52/32/54 2008    metoprolol succinate (TOPROL-XL) 24 hr tablet 12 5 mg  12 5 mg Oral Daily Mary Kate Fly, DO   12 5 mg at 07/08/18 0907    multivitamin stress formula tablet 1 tablet  1 tablet Oral Daily Mary Kate Fly, DO   1 tablet at 07/09/18 0900    ondansetron (ZOFRAN) injection 4 mg  4 mg Intravenous Q6H PRN Berenice Mathew MD        pantoprazole (PROTONIX) EC tablet 40 mg  40 mg Oral Early Morning Jimmy العلي MD   40 mg at 07/09/18 0509    polyethylene glycol (MIRALAX) packet 17 g  17 g Oral BID PRN Marvelyn [place], CRNP        pravastatin (PRAVACHOL) tablet 40 mg  40 mg Oral Daily With [place] MD Sylvie   40 mg at 07/08/18 1631    predniSONE tablet 5 mg  5 mg Oral Daily With Breakfast Chris Breath MD Mohsen   5 mg at 18 0858    senna (SENOKOT) tablet 8 6 mg  1 tablet Oral HS Ho Stuart MD   8 6 mg at 18 2124    sodium phosphate-biphosphate (FLEET) enema 1 enema  1 enema Rectal Daily PRN Samra Nose, DO   1 enema at 18 1601    traMADol (ULTRAM) tablet 50 mg  50 mg Oral Q6H PRN Mary Kate Fly, DO   50 mg at 18 1735      Prescriptions Prior to Admission   Medication    acetaminophen (TYLENOL) 650 mg suppository    albuterol (2 5 mg/3 mL) 0 083 % nebulizer solution    aspirin 81 MG tablet    budesonide (PULMICORT) 0 5 mg/2 mL nebulizer solution    docusate sodium (COLACE) 100 mg capsule    famotidine (PEPCID) 20 mg tablet    furosemide (LASIX) 40 mg tablet    glimepiride (AMARYL) 1 mg tablet    latanoprost (XALATAN) 0 005 % ophthalmic solution    metolazone (ZAROXOLYN) 5 mg tablet    metoprolol succinate (TOPROL-XL) 25 mg 24 hr tablet    polyethylene glycol (MIRALAX) 17 g packet    [] predniSONE 10 mg tablet    senna (SENOKOT) 8 6 MG tablet    simvastatin (ZOCOR) 20 mg tablet    sodium chloride (OCEAN) 0 65 % nasal spray    spironolactone (ALDACTONE) 25 mg tablet    umeclidinium-vilanterol (ANORO ELLIPTA) 62 5-25 MCG/INH inhaler    acetaminophen-codeine (TYLENOL #3) 300-30 mg per tablet    cephalexin (KEFLEX) 500 mg capsule       Labs:    Results from last 7 days  Lab Units 18  0509 18  0452 18  0442  18  0459   WBC Thousand/uL 11 14* 12 43* 9 62  < > 11 06*   HEMOGLOBIN g/dL 8 1* 8 1* 7 9*  < > 8 0*   HEMATOCRIT % 27 2* 27 6* 26 6*  < > 26 3*   PLATELETS Thousands/uL 237 243 211  < > 206   NEUTROS PCT % 84*  --   --   --  83*   LYMPHS PCT % 5*  --   --   --  6*   MONOS PCT % 8  --   --   --  9   EOS PCT % 2  --   --   --  1   < > = values in this interval not displayed      Results from last 7 days  Lab Units 18  0509 18  0452 18  0442 18  0205   SODIUM mmol/L 142 145 145 141   POTASSIUM mmol/L 3 8 3 6 4 1 3 9 CHLORIDE mmol/L 101 104 103 102   CO2 mmol/L 34* 34* 36* 30   BUN mg/dL 76* 71* 80* 90*   CREATININE mg/dL 2 50* 1 92* 2 05* 2 25*   CALCIUM mg/dL 7 9* 8 2* 8 3 8 4   TOTAL PROTEIN g/dL  --   --   --  6 7   BILIRUBIN TOTAL mg/dL  --   --   --  0 40   ALK PHOS U/L  --   --   --  63   ALT U/L  --   --   --  23   AST U/L  --   --   --  25   GLUCOSE RANDOM mg/dL 137 116 166* 142*     Lab Results   Component Value Date    TROPONINI 0 09 (H) 07/03/2018    TROPONINI 0 08 (H) 07/03/2018    TROPONINI 0 10 (H) 07/03/2018    CKTOTAL 56 07/01/2018         Lab Results   Component Value Date    SPUTUMCULTUR 3+ Growth of Pseudomonas aeruginosa (A) 07/05/2018    SPUTUMCULTUR 3+ Growth of  07/05/2018         Imaging:  Results for orders placed during the hospital encounter of 07/01/18   XR chest portable    Narrative CHEST     INDICATION:   Shortness of breath  Congestive failure       COMPARISON:  None    EXAM PERFORMED/VIEWS:  XR CHEST PORTABLE      FINDINGS:  There are median sternotomy wires indicating prior cardiac surgery  Dual-lead left chest pacemaker is noted prosthetic cardiac valve is seen  Heart is enlarged  Atherosclerotic vascular calcifications are noted  Mild pulmonary vascular congestive changes  Left lung base is poorly evaluated there appears to be a left pleural effusion  Trace costophrenic angle effusion on the right  Relative increased parenchymal density in the left perihilar region of the right lower lobe raises the suspicion of either   atelectasis or pneumonia  Osseous structures appear within normal limits for patient age  Impression Cardiomegaly  Mild pulmonary vascular congestive changes  Left greater than right pleural effusions  Relative increased parenchymal density in the left perihilar region and right lower lobe could represent atelectasis or small patchy areas of   pneumonia          Workstation performed: [id number]       Results for orders placed during the hospital encounter of 07/01/18   XR chest pa & lateral    Narrative CHEST     INDICATION:   sob  COMPARISON:  7/3/2018    EXAM PERFORMED/VIEWS:  XR CHEST PA & LATERAL      FINDINGS:  There is a left-sided pacer  Heart shadow is enlarged but unchanged from prior exam     There is pulmonary vascular congestion with left greater than right effusions  Right basilar patchy opacity has improved, likely atelectasis  Osseous structures appear within normal limits for patient age  Impression Stable congestion with small effusions  Improved right basilar opacity, likely atelectasis  Workstation performed: [id number]         Labs & Imaging: I have personally reviewed pertinent reports        VTE Pharmacologic Prophylaxis: Reason for no pharmacologic prophylaxis GI bleed  VTE Mechanical Prophylaxis: sequential compression device    Code Status:   Level 1 - Full Code      "This note has been constructed using a voice recognition system"      Melissa Bartlett MD  7/9/2018,12:29 PM

## 2018-07-09 NOTE — PROGRESS NOTES
Pt c/o 10/10 abdominal pain  Abdomin is obese, distended, firm  Pt vitals obtained by PCA  SW family who state (son and daughter) that pt has a high tolerance for pain and would not be vocal about pain unless it was severe   Dr Lorenzo Dawn: gave above information  Dr Lorenzo Dawn at bedside  CT scan ordered with PO contrast   Pt drank very small amount then refused the rest   Informed Dr Lorenzo Dawn: ok to do dry CT  Gave PO pain medication (see Mar)  Obtained bladder scan as no urine has been documented since 0330 07/09/2018  Bladder scan showed >554, small amount of urine on brief  Sent to CT (ready for pt)

## 2018-07-09 NOTE — PROGRESS NOTES
Nikki Castro  [id number]    74 y o   male      ASSESSMENT  1   Symptomatic anemia with melena   EGD 7/2 showed erosive gastritis and a small hiatal hernia  + intestinal metaplasia of stomach  H P negative   No overt bleeding site identified  Pk Wilson remains stable or actually up slightly at 8 1 today   2   Acute anemia probably acute blood loss anemia  3   GERD on Pepcid  4   Distant history of iron deficiency anemia and occult GI bleeding   S/P EGD/colonoscopy/capsule endoscopy 2007   Treated with a year course of oral iron  5   Distant history of peptic ulcer disease  6   Distant history of colon cancer  7   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  with congestive heart failure followed by Dr Paul Mcgarry  8   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  9   H/O AAA  10   DM        PLAN  1   Follow H&H and transfuse as needed  2   Watch for overt GI blood loss  3   Continue Protonix  4  Consider colonoscopy if hemoglobin drops further- but this point I do not think this is something he could tolerate due to age and comorbidities  5   DC suppository  Consider discontinuing lactulose if his stools become more frequent  SUBJECTIVE/HPI no GI complaints at this time  Nurse reports the patient got good results with laxatives and suppositories yesterday  Not eating much complaining of shortness of breath he feels better on his left side  /59 (BP Location: Right arm)   Pulse 58   Temp 98 4 °F (36 9 °C) (Oral)   Resp 20   Ht 5' 6" (1 676 m)   Wt 86 3 kg (190 lb 4 1 oz)   SpO2 95%   BMI 30 71 kg/m²       Physical Exam:  AwakeAnd alert in mild to moderate distress skin is pale mucous membranes dry abdomen distended soft nontender        Lab Results   Component Value Date    GLUCOSE 137 07/09/2018    CALCIUM 7 9 (L) 07/09/2018     07/09/2018    K 3 8 07/09/2018    CO2 34 (H) 07/09/2018     07/09/2018    BUN 76 (H) 07/09/2018    CREATININE 2 50 (H) 07/09/2018     Lab Results   Component Value Date    WBC 11 14 (H) 07/09/2018    HGB 8 1 (L) 07/09/2018    HCT 27 2 (L) 07/09/2018    MCV 91 07/09/2018     07/09/2018     Lab Results   Component Value Date    ALT 23 07/06/2018    AST 25 07/06/2018    ALKPHOS 63 07/06/2018    BILITOT 0 40 07/06/2018     No components found for: [id number]  No results found for: LIPASE  Lab Results   Component Value Date    IRON 18 (L) 07/02/2018    TIBC 423 07/02/2018    FERRITIN 47 07/02/2018     Lab Results   Component Value Date    INR 1 17 07/01/2018       Total floor / unit time spent today 25 minutes  Greater than 50% of total time was spent with the patient and / or family counseling and / or coordination of care  A description of the counseling / coordination of care:  25 minutes      Jamal Lizarraga MD

## 2018-07-09 NOTE — PROGRESS NOTES
NEPHROLOGY PROGRESS NOTE   Chastity Williamson 80 y o  male MRN: [id number]  Unit/Bed#: [address] [place] [address] 202-01 Encounter: [id number]  Reason for Consult: DINO (POA)    ASSESSMENT/PLAN:  1  DINO (POA) in CKD: initially pre-renal ATN with urinary retention, now with cardiorenal component  Creatinine was slowly improving down to 1 9, again elevated today at 2 5    -Will discuss diuretic with Dr Onur Krueger, currently on Lasix 20 mg PO BID    -LEONIDES: negative for obstruction   -avoid nephrotoxins, dye, NSAID's, hypotension   -good urine output  2  CKD III/IV: baseline creatinine in Feb 2018, 1 5  Likely diabetic nephropathy and HTN nephrosclerosis contributing  Does not follow with Nephrology OP   -Will need OP follow up on discharge  3  BPH with urinary retention: S/P [place] catheter removal    -On Flomax  4  Symptomatic Anemia: S/P EGD 07/02 which showed gastritis and small hiatal hernia  Did receive 1 U PRBC's  Hgb stable in the 8's  -GI following, considering colonoscopy if Hgb drops  -Continue to monitor and transfuse as needed    -Continue PO iron supplementation  5  CHF/Volume status: Baseline weight per family around 190 lbs  -Echo: EF= 30-35%  -Maintain on 1500 cc fluid restriction    -Continue daily weights, strict I/O  -Weights improving with diuresis, ? 8 lb weight loss since yesterday  6  HTN: now with hypotension  Goal SBP for age 80   -continue to monitor   -Avoid further hypotension    -Continue on metoprolol 12 5 mg with hold parameters  7  Constipation: resolved  -Continues on Lactulose daily  Disposition:  Will need rehab facility at discharge  SUBJECTIVE:  Mr Jensen Joe is sleeping in his chair  His son is at the bedside  His son states that he did not eat breakfast this morning and has had a poor appetite  He seems a little more tired than usual   The patient denies any chest pain, shortness of breath, nausea, vomiting, diarrhea, or issues with urination    He states that he had a bowel movement on Saturday and has not had 1 since  Per his son he did have more bowel movements over the weekend  He is concerned that his stat has had weight loss      OBJECTIVE:  Current Weight: Weight - Scale: 82 6 kg (182 lb 1 6 oz)  Vitals:    07/08/18 2100 07/08/18 2214 07/09/18 0700 07/09/18 0903   BP:  115/56 103/63 106/59   BP Location:  Right arm Right arm Right arm   Pulse:  58 (!) 115 58   Resp:  22 20    Temp:  98 °F (36 7 °C) 98 4 °F (36 9 °C)    TempSrc:  Oral Oral    SpO2: 95% 95% 95%    Weight:   82 6 kg (182 lb 1 6 oz)    Height:           Intake/Output Summary (Last 24 hours) at 07/09/18 0915  Last data filed at 07/09/18 2748   Gross per 24 hour   Intake                0 ml   Output             1108 ml   Net            -1108 ml     General: No apparent distress  Skin: warm, dry, intact, no rash  HEENT: Moist mucous membranes, sclera anicteric, normocephalic  Neck: No apparent JVD appreciated  Chest: Lung sounds decreased, clear B/L, on 2 L NC   Heart: Regular rate and rhythm, No murmer  Abdomen: Soft, round, NT, +BS  Extremities: No B/L LE edema present  Neuro: Alert and oriented  Psych: Appropriate mood and affect    Medications:    Current Facility-Administered Medications:     acetaminophen (TYLENOL) tablet 650 mg, 650 mg, Oral, Q6H PRN, Nhan Nelson MD, 650 mg at 07/01/18 2016    albuterol inhalation solution 2 5 mg, 2 5 mg, Nebulization, Q6H PRN, Nhan Nelson MD, 2 5 mg at 07/01/18 1954    budesonide (PULMICORT) inhalation solution 0 5 mg, 0 5 mg, Nebulization, BID, Nhan Nelson MD, 0 5 mg at 07/09/18 0807    ferrous sulfate tablet 325 mg, 325 mg, Oral, Daily With Breakfast, Mary Kate Fly, DO, 325 mg at 07/09/18 0858    furosemide (LASIX) tablet 20 mg, 20 mg, Oral, BID (diuretic), Mary Kate Fly, DO, 20 mg at 07/09/18 0858    HYDROcodone-acetaminophen (NORCO) 5-325 mg per tablet 1 tablet, 1 tablet, Oral, Q6H PRN, Nhan Nelson MD, 1 tablet at 07/03/18 1120    insulin glargine (LANTUS) subcutaneous injection 18 Units 0 18 mL, 18 Units, Subcutaneous, HS, Luis Antonio Mayes MD, 18 Units at 07/08/18 2125    insulin lispro (HumaLOG) 100 units/mL subcutaneous injection 1-5 Units, 1-5 Units, Subcutaneous, HS, ROMAINE Dillon, 2 Units at 07/06/18 2114    insulin lispro (HumaLOG) 100 units/mL subcutaneous injection 1-6 Units, 1-6 Units, Subcutaneous, TID AC, 1 Units at 07/09/18 0859 **AND** Fingerstick Glucose (POCT), , , TID AC, Korina Bustillos MD    insulin lispro (HumaLOG) 100 units/mL subcutaneous injection 5 Units, 5 Units, Subcutaneous, TID With Meals, Luis Antonio Mayes MD, 5 Units at 07/09/18 0858    ipratropium-albuterol (DUO-NEB) 0 5-2 5 mg/3 mL inhalation solution 3 mL, 3 mL, Nebulization, TID, Florinda Stevenson, DO, 3 mL at 07/09/18 0807    lactulose 20 g/30 mL oral solution 20 g, 20 g, Oral, Daily, Luis Antonio Mayes MD, 20 g at 07/09/18 0858    latanoprost (XALATAN) 0 005 % ophthalmic solution 1 drop, 1 drop, Both Eyes, HS, Korina Bustillos MD, 1 drop at 07/08/18 2125    levofloxacin (LEVAQUIN) tablet 250 mg, 250 mg, Oral, Q24H, Mary Kate Fly, DO, 250 mg at 07/08/18 1631    menthol-methyl salicylate (BENGAY) 77-80 % cream, , Apply externally, 4x Daily PRN, ROMAINE Foley, 1 application at 56/72/36 2008    metoprolol succinate (TOPROL-XL) 24 hr tablet 12 5 mg, 12 5 mg, Oral, Daily, Mary Kate Fly, DO, 12 5 mg at 07/08/18 0907    multivitamin stress formula tablet 1 tablet, 1 tablet, Oral, Daily, Mary Kate Fly, DO, 1 tablet at 07/09/18 0900    ondansetron (ZOFRAN) injection 4 mg, 4 mg, Intravenous, Q6H PRN, Korina Bustillos MD    pantoprazole (PROTONIX) EC tablet 40 mg, 40 mg, Oral, Early Morning, Giovanni Wayne MD, 40 mg at 07/09/18 7406    polyethylene glycol (MIRALAX) packet 17 g, 17 g, Oral, BID PRN, ROMAINE Velásquez    pravastatin (PRAVACHOL) tablet 40 mg, 40 mg, Oral, Daily With Evita Chowdhury MD, 40 mg at 07/08/18 1631    predniSONE tablet 5 mg, 5 mg, Oral, Daily With Breakfast, Son Hood MD, 5 mg at 07/09/18 0858    senna (SENOKOT) tablet 8 6 mg, 1 tablet, Oral, HS, Son Hood MD, 8 6 mg at 07/08/18 2124    sodium phosphate-biphosphate (FLEET) enema 1 enema, 1 enema, Rectal, Daily PRN, Cayetano Hang, DO, 1 enema at 07/07/18 1601    traMADol (ULTRAM) tablet 50 mg, 50 mg, Oral, Q6H PRN, Cayetano Hang, DO, 50 mg at 07/03/18 1735    Laboratory Results:    Results from last 7 days  Lab Units 07/09/18  0509 07/08/18  0452 07/07/18  0442 07/06/18  0205 07/05/18  0609 07/04/18  0432 07/03/18  0722 07/03/18  0459   WBC Thousand/uL 11 14* 12 43* 9 62 9 78  --  11 05*  --  11 06*   HEMOGLOBIN g/dL 8 1* 8 1* 7 9* 7 7* 7 5* 7 8*  --  8 0*   HEMATOCRIT % 27 2* 27 6* 26 6* 25 3* 24 8* 26 1*  --  26 3*   PLATELETS Thousands/uL 237 243 211 203  --  207  --  206   SODIUM mmol/L 142 145 145 141 140 141 142  --    POTASSIUM mmol/L 3 8 3 6 4 1 3 9 4 1 4 3 3 8  --    CHLORIDE mmol/L 101 104 103 102 102 103 104  --    CO2 mmol/L 34* 34* 36* 30 31 32 33*  --    BUN mg/dL 76* 71* 80* 90* 90* 86* 93*  --    CREATININE mg/dL 2 50* 1 92* 2 05* 2 25* 2 08* 2 21* 2 54*  --    CALCIUM mg/dL 7 9* 8 2* 8 3 8 4 8 3 7 9* 7 9*  --    MAGNESIUM mg/dL  --   --   --   --   --  2 5  --   --    TOTAL PROTEIN g/dL  --   --   --  6 7  --   --   --   --    GLUCOSE RANDOM mg/dL 137 116 166* 142* 162* 201* 159*  --

## 2018-07-09 NOTE — PROGRESS NOTES
Called to bedside by nursing as patient complains of fairly acute onset abdominal pain and distension  Patient reports the pain is diffuse  117/55, 89, 97 8  On physical exam the patient's abdomen is distended but soft  Patient has diffuse tenderness to palpation but no peritoneal signs  -with these new symptoms and physical exam findings will order stat CT scan of the abdomen pelvis with oral contrast only as the patient does have acute kidney injury on chronic kidney disease stage 3   -multiple family members updated at bedside

## 2018-07-09 NOTE — PLAN OF CARE
Problem: Potential for Falls  Goal: Patient will remain free of falls  INTERVENTIONS:  - Assess patient frequently for physical needs  -  Identify cognitive and physical deficits and behaviors that affect risk of falls    -  [place] fall precautions as indicated by assessment   - Educate patient/family on patient safety including physical limitations  - Instruct patient to call for assistance with activity based on assessment  - Modify environment to reduce risk of injury  - Consider OT/PT consult to assist with strengthening/mobility   Outcome: Progressing      Problem: Prexisting or High Potential for Compromised Skin Integrity  Goal: Skin integrity is maintained or improved  INTERVENTIONS:  - Identify patients at risk for skin breakdown  - Assess and monitor skin integrity  - Assess and monitor nutrition and hydration status  - Monitor labs (i e  albumin)  - Assess for incontinence   - Turn and reposition patient  - Assist with mobility/ambulation  - Relieve pressure over bony prominences  - Avoid friction and shearing  - Provide appropriate hygiene as needed including keeping skin clean and dry  - Evaluate need for skin moisturizer/barrier cream  - Collaborate with interdisciplinary team (i e  Nutrition, Rehabilitation, etc )   - Patient/family teaching   Outcome: Progressing      Problem: PAIN - ADULT  Goal: Verbalizes/displays adequate comfort level or baseline comfort level  Interventions:  - Encourage patient to monitor pain and request assistance  - Assess pain using appropriate pain scale, rate scale 0-10  - Administer analgesics based on type and severity of pain and evaluate response  - Implement non-pharmacological measures as appropriate and evaluate response  - Consider cultural and social influences on pain and pain management  - Notify physician/advanced practitioner if interventions unsuccessful or patient reports new pain   Outcome: Progressing      Problem: INFECTION - ADULT  Goal: Absence or prevention of progression during hospitalization  INTERVENTIONS:  - Assess and monitor for signs and symptoms of infection  - Monitor lab/diagnostic results  - Monitor all insertion sites, i e  indwelling lines, tubes, and drains  - Monitor endotracheal (as able) and nasal secretions for changes in amount and color  - [place] appropriate cooling/warming therapies per order  - Administer medications as ordered  - Instruct and encourage patient and family to use good hand hygiene technique  - Identify and instruct in appropriate isolation precautions for identified infection/condition   Outcome: Progressing      Problem: DISCHARGE PLANNING  Goal: Discharge to home or other facility with appropriate resources  INTERVENTIONS:  - Identify barriers to discharge w/patient and caregiver  - Arrange for needed discharge resources and transportation as appropriate  - Identify discharge learning needs (meds, wound care, etc )  - Arrange for interpretive services to assist at discharge as needed  - Refer to Case Management Department for coordinating discharge planning if the patient needs post-hospital services based on physician/advanced practitioner order or complex needs related to functional status, cognitive ability, or social support system   Outcome: Progressing      Problem: Knowledge Deficit  Goal: Patient/family/caregiver demonstrates understanding of disease process, treatment plan, medications, and discharge instructions  Complete learning assessment and assess knowledge base    Interventions:  - Provide teaching at level of understanding  - Provide teaching via preferred learning methods   Outcome: Progressing      Problem: CARDIOVASCULAR - ADULT  Goal: Absence of cardiac dysrhythmias or at baseline rhythm  INTERVENTIONS:  - Continuous cardiac monitoring, monitor vital signs, obtain 12 lead EKG if indicated  - Administer antiarrhythmic and heart rate control medications as ordered  - Monitor electrolytes and administer replacement therapy as ordered   Outcome: Progressing      Problem: RESPIRATORY - ADULT  Goal: Achieves optimal ventilation and oxygenation  INTERVENTIONS:  - Assess for changes in respiratory status  - Assess for changes in mentation and behavior  - Position to facilitate oxygenation and minimize respiratory effort  - Oxygen administration by appropriate delivery method based on oxygen saturation, via nasal canula  - Encourage broncho-pulmonary hygiene including cough, deep breathe, Incentive Spirometry  - Assess the need for suctioning and aspirate as needed  - Assess and instruct to report SOB or any respiratory difficulty  - Respiratory Therapy support as indicated   Outcome: Progressing      Problem: METABOLIC, FLUID AND ELECTROLYTES - ADULT  Goal: Electrolytes maintained within normal limits  INTERVENTIONS:  - Monitor labs and assess patient for signs and symptoms of electrolyte imbalances  - Administer electrolyte replacement as ordered  - Monitor response to electrolyte replacements, including repeat lab results as appropriate  - Instruct patient on fluid and nutrition as appropriate   Outcome: Progressing    Goal: Fluid balance maintained  INTERVENTIONS:  - Monitor labs and assess for signs and symptoms of volume excess or deficit  - Monitor I/O and WT  - Instruct patient on fluid and nutrition as appropriate   Outcome: Progressing    Goal: Glucose maintained within target range  INTERVENTIONS:  - Monitor Blood Glucose as ordered  - Assess for signs and symptoms of hyperglycemia and hypoglycemia  - Administer ordered medications to maintain glucose within target range  - Assess nutritional intake and initiate nutrition service referral as needed   Outcome: Progressing      Problem: SKIN/TISSUE INTEGRITY - ADULT  Goal: Incision(s), wounds(s) or drain site(s) healing without S/S of infection  INTERVENTIONS  - Assess and document risk factors for skin impairment   - Assess and document dressing, incision, wound bed, drain sites and surrounding tissue  - Initiate Nutrition services consult and/or wound management as needed   Outcome: Progressing      Problem: HEMATOLOGIC - ADULT  Goal: Maintains hematologic stability  INTERVENTIONS  - Assess for signs and symptoms of bleeding or hemorrhage  - Monitor labs  - Administer supportive blood products/factors as ordered and appropriate   Outcome: Progressing

## 2018-07-10 LAB
ANION GAP SERPL CALCULATED.3IONS-SCNC: 6 MMOL/L (ref 4–13)
BASOPHILS # BLD AUTO: 0.02 THOUSANDS/ΜL (ref 0–0.1)
BASOPHILS NFR BLD AUTO: 0 % (ref 0–1)
BUN SERPL-MCNC: 76 MG/DL (ref 5–25)
CALCIUM SERPL-MCNC: 8.1 MG/DL (ref 8.3–10.1)
CHLORIDE SERPL-SCNC: 103 MMOL/L (ref 100–108)
CO2 SERPL-SCNC: 35 MMOL/L (ref 21–32)
CREAT SERPL-MCNC: 2.36 MG/DL (ref 0.6–1.3)
EOSINOPHIL # BLD AUTO: 0.18 THOUSAND/ΜL (ref 0–0.61)
EOSINOPHIL NFR BLD AUTO: 2 % (ref 0–6)
ERYTHROCYTE [DISTWIDTH] IN BLOOD BY AUTOMATED COUNT: 16.5 % (ref 11.6–15.1)
GFR SERPL CREATININE-BSD FRML MDRD: 23 ML/MIN/1.73SQ M
GLUCOSE SERPL-MCNC: 130 MG/DL (ref 65–140)
GLUCOSE SERPL-MCNC: 139 MG/DL (ref 65–140)
GLUCOSE SERPL-MCNC: 139 MG/DL (ref 65–140)
GLUCOSE SERPL-MCNC: 76 MG/DL (ref 65–140)
GLUCOSE SERPL-MCNC: 80 MG/DL (ref 65–140)
GLUCOSE SERPL-MCNC: 95 MG/DL (ref 65–140)
HCT VFR BLD AUTO: 27.5 % (ref 36.5–49.3)
HGB BLD-MCNC: 8.1 G/DL (ref 12–17)
IMM GRANULOCYTES # BLD AUTO: 0.07 THOUSAND/UL (ref 0–0.2)
IMM GRANULOCYTES NFR BLD AUTO: 1 % (ref 0–2)
LYMPHOCYTES # BLD AUTO: 0.52 THOUSANDS/ΜL (ref 0.6–4.47)
LYMPHOCYTES NFR BLD AUTO: 5 % (ref 14–44)
MCH RBC QN AUTO: 27.1 PG (ref 26.8–34.3)
MCHC RBC AUTO-ENTMCNC: 29.5 G/DL (ref 31.4–37.4)
MCV RBC AUTO: 92 FL (ref 82–98)
MONOCYTES # BLD AUTO: 0.79 THOUSAND/ΜL (ref 0.17–1.22)
MONOCYTES NFR BLD AUTO: 7 % (ref 4–12)
NEUTROPHILS # BLD AUTO: 9.13 THOUSANDS/ΜL (ref 1.85–7.62)
NEUTS SEG NFR BLD AUTO: 85 % (ref 43–75)
NRBC BLD AUTO-RTO: 0 /100 WBCS
PLATELET # BLD AUTO: 232 THOUSANDS/UL (ref 149–390)
PMV BLD AUTO: 10.6 FL (ref 8.9–12.7)
POTASSIUM SERPL-SCNC: 3.7 MMOL/L (ref 3.5–5.3)
RBC # BLD AUTO: 2.99 MILLION/UL (ref 3.88–5.62)
SODIUM SERPL-SCNC: 144 MMOL/L (ref 136–145)
WBC # BLD AUTO: 10.71 THOUSAND/UL (ref 4.31–10.16)

## 2018-07-10 PROCEDURE — 99232 SBSQ HOSP IP/OBS MODERATE 35: CPT | Performed by: INTERNAL MEDICINE

## 2018-07-10 PROCEDURE — 97530 THERAPEUTIC ACTIVITIES: CPT

## 2018-07-10 PROCEDURE — 80048 BASIC METABOLIC PNL TOTAL CA: CPT | Performed by: INTERNAL MEDICINE

## 2018-07-10 PROCEDURE — 94760 N-INVAS EAR/PLS OXIMETRY 1: CPT

## 2018-07-10 PROCEDURE — 99222 1ST HOSP IP/OBS MODERATE 55: CPT | Performed by: PHYSICIAN ASSISTANT

## 2018-07-10 PROCEDURE — 94640 AIRWAY INHALATION TREATMENT: CPT

## 2018-07-10 PROCEDURE — 0T9B70Z DRAINAGE OF BLADDER WITH DRAINAGE DEVICE, VIA NATURAL OR ARTIFICIAL OPENING: ICD-10-PCS | Performed by: INTERNAL MEDICINE

## 2018-07-10 PROCEDURE — 85025 COMPLETE CBC W/AUTO DIFF WBC: CPT | Performed by: INTERNAL MEDICINE

## 2018-07-10 PROCEDURE — 82948 REAGENT STRIP/BLOOD GLUCOSE: CPT

## 2018-07-10 RX ORDER — TAMSULOSIN HYDROCHLORIDE 0.4 MG/1
0.4 CAPSULE ORAL
Status: DISCONTINUED | OUTPATIENT
Start: 2018-07-10 | End: 2018-07-13 | Stop reason: HOSPADM

## 2018-07-10 RX ORDER — FINASTERIDE 5 MG/1
5 TABLET, FILM COATED ORAL DAILY
Status: DISCONTINUED | OUTPATIENT
Start: 2018-07-10 | End: 2018-07-13 | Stop reason: HOSPADM

## 2018-07-10 RX ORDER — POLYETHYLENE GLYCOL 3350 17 G/17G
17 POWDER, FOR SOLUTION ORAL 2 TIMES DAILY PRN
Status: DISCONTINUED | OUTPATIENT
Start: 2018-07-11 | End: 2018-07-12

## 2018-07-10 RX ADMIN — METOPROLOL SUCCINATE 12.5 MG: 25 TABLET, EXTENDED RELEASE ORAL at 09:56

## 2018-07-10 RX ADMIN — TAMSULOSIN HYDROCHLORIDE 0.4 MG: 0.4 CAPSULE ORAL at 15:56

## 2018-07-10 RX ADMIN — IPRATROPIUM BROMIDE AND ALBUTEROL SULFATE 3 ML: 2.5; .5 SOLUTION RESPIRATORY (INHALATION) at 14:25

## 2018-07-10 RX ADMIN — BUDESONIDE 0.5 MG: 0.5 INHALANT RESPIRATORY (INHALATION) at 20:06

## 2018-07-10 RX ADMIN — IPRATROPIUM BROMIDE AND ALBUTEROL SULFATE 3 ML: 2.5; .5 SOLUTION RESPIRATORY (INHALATION) at 10:06

## 2018-07-10 RX ADMIN — LEVOFLOXACIN 250 MG: 250 TABLET, FILM COATED ORAL at 15:56

## 2018-07-10 RX ADMIN — POLYETHYLENE GLYCOL 3350 17 G: 17 POWDER, FOR SOLUTION ORAL at 13:37

## 2018-07-10 RX ADMIN — SENNOSIDES 8.6 MG: 8.6 TABLET, FILM COATED ORAL at 21:37

## 2018-07-10 RX ADMIN — FINASTERIDE 5 MG: 5 TABLET, FILM COATED ORAL at 15:56

## 2018-07-10 RX ADMIN — PANTOPRAZOLE SODIUM 40 MG: 40 TABLET, DELAYED RELEASE ORAL at 06:08

## 2018-07-10 RX ADMIN — IPRATROPIUM BROMIDE AND ALBUTEROL SULFATE 3 ML: 2.5; .5 SOLUTION RESPIRATORY (INHALATION) at 20:06

## 2018-07-10 RX ADMIN — INSULIN GLARGINE 18 UNITS: 100 INJECTION, SOLUTION SUBCUTANEOUS at 21:37

## 2018-07-10 RX ADMIN — PREDNISONE 5 MG: 5 TABLET ORAL at 09:56

## 2018-07-10 RX ADMIN — ZINC 1 TABLET: TAB ORAL at 09:59

## 2018-07-10 RX ADMIN — PRAVASTATIN SODIUM 40 MG: 40 TABLET ORAL at 15:56

## 2018-07-10 RX ADMIN — BUDESONIDE 0.5 MG: 0.5 INHALANT RESPIRATORY (INHALATION) at 10:06

## 2018-07-10 RX ADMIN — FERROUS SULFATE TAB 325 MG (65 MG ELEMENTAL FE) 325 MG: 325 (65 FE) TAB at 09:56

## 2018-07-10 RX ADMIN — LATANOPROST 1 DROP: 50 SOLUTION OPHTHALMIC at 21:37

## 2018-07-10 NOTE — PHYSICAL THERAPY NOTE
Physical Therapy Progress Note     07/10/18 1035   Pain Assessment   Pain Assessment 0-10   Pain Score 4   Pain Location Abdomen   Pain Orientation Left   Pain Descriptors Patient unable to describe   Pain Frequency Intermittent   Pain Onset Unable to tell   Clinical Progression Gradually improving   Effect of Pain on Daily Activities guarding, limiting mobility   Patient's Stated Pain Goal No pain   Hospital Pain Intervention(s) Repositioned; Ambulation/increased activity; Emotional support   Response to Interventions tolerated session   Restrictions/Precautions   Weight Bearing Precautions Per Order No   Other Precautions Cognitive; Chair Alarm; Bed Alarm;O2;Fall Risk;Pain;Fluid restriction   General   Chart Reviewed Yes   Additional Pertinent History cleared for therapy by RN   Response to Previous Treatment Patient with no complaints from previous session  Family/Caregiver Present Yes  (daughter & son)   Cognition   Overall Cognitive Status Impaired   Arousal/Participation Lethargic;Persistent stimuli required   Attention Difficulty attending to directions   Orientation Level Oriented to person;Oriented to place   Memory Decreased recall of recent events   Following Commands Follows one step commands with increased time or repetition   Subjective   Subjective Pt observed in bed, agreeable to therapy services  Family at bedside  Bed Mobility   Rolling L 2  Maximal assistance   Additional items Assist x 1;Bedrails   Supine to Sit 2  Maximal assistance   Additional items Assist x 1   Sit to Supine 2  Maximal assistance   Additional items Assist x 1; Increased time required   Additional Comments Pillows placed for comfort for upright trunk support while OOB in chair   Transfers   Sit to Stand 3  Moderate assistance   Additional items Assist x 1   Stand to Sit 3  Moderate assistance   Additional items Assist x 1   Stand pivot 3  Moderate assistance   Additional items Assist x 1; Increased time required;Verbal cues Ambulation/Elevation   Gait pattern R Knee Jigna;L Knee Jigna; Improper Weight shift;Decreased foot clearance;Narrow FLORES;Step to   Gait Assistance 2  Maximal assist   Additional items Assist x 1   Assistive Device Other (Comment)  (Hand held assist)   Distance 5 steps  (bed > chair)   Balance   Static Sitting Fair -   Dynamic Sitting Fair -   Static Standing Poor   Dynamic Standing Poor -   Endurance Deficit   Endurance Deficit Yes   Endurance Deficit Description +TRIVEDI, pain   Activity Tolerance   Activity Tolerance Patient limited by fatigue;Patient limited by pain   Nurse Made Aware RN consented to therapy session   Medical Staff Made Aware OTMerlene - updated   Assessment   Prognosis Fair   Problem List Decreased endurance;Decreased range of motion;Decreased strength;Decreased safety awareness;Pain;Decreased cognition;Decreased mobility; Impaired balance   Assessment Pt reamins with limited interaction t/o session without persistent stimuli - requires VC's for breathing techniques as well  Max A required for OOB mobiltiy - pt maintains eyes clsod t/o much of session however responds to name  Will continue to progress/assess as able with recommendation for skilled therapy once medically stable for D/C  Goals   Patient Goals none stated   STG Expiration Date 07/16/18   Treatment Day 4   Plan   Treatment/Interventions Functional transfer training;LE strengthening/ROM; Therapeutic exercise;Patient/family training;Equipment eval/education; Bed mobility;Gait training;Continued evaluation;Spoke to case management;Spoke to nursing;OT   Progress Slow progress, medical status limitations   PT Frequency Other (Comment)  (3-5x/week)   Recommendation   Recommendation Post acute IP rehab   Equipment Recommended Fabio Schafer, PT

## 2018-07-10 NOTE — PLAN OF CARE
Problem: PHYSICAL THERAPY ADULT  Goal: Performs mobility at highest level of function for planned discharge setting  See evaluation for individualized goals  Treatment/Interventions: Functional transfer training, LE strengthening/ROM, Therapeutic exercise, Endurance training, Bed mobility, Gait training, Spoke to nursing, OT  Equipment Recommended: Sarai Regalado, PT         See flowsheet documentation for full assessment, interventions and recommendations  Outcome: Progressing  Prognosis: Fair  Problem List: Decreased endurance, Decreased range of motion, Decreased strength, Decreased safety awareness, Pain, Decreased cognition, Decreased mobility, Impaired balance  Assessment: Pt reamins with limited interaction t/o session without persistent stimuli - requires VC's for breathing techniques as well  Max A required for OOB mobiltiy - pt maintains eyes clsod t/o much of session however responds to name  Will continue to progress/assess as able with recommendation for skilled therapy once medically stable for D/C  Recommendation: Post acute IP rehab          See flowsheet documentation for full assessment

## 2018-07-10 NOTE — PROGRESS NOTES
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, 1 5  Likely diabetic nephropathy and HTN nephrosclerosis contributing  Does not follow with Nephrology OP   -Will need OP follow up on discharge      3  BPH with urinary retention: S/P elmore catheter removal   Requiring straight catheterization overnight and elmore insertion  Bladder scan showed greater than 600 cc  -CT abdomen:  Bladder distention with bilateral hydronephrosis   -On Flomax   -may need Urology consultation      4  Symptomatic Anemia: S/P EGD 07/02 which showed gastritis and small hiatal hernia  Did receive 1 U PRBC's  Hgb stable in the 8's  -GI following, considering colonoscopy if Hgb drops  -Continue to monitor and transfuse as needed    -Continue PO iron supplementation       5  CHF/Volume status: Baseline weight per family around 190 lbs  -Echo: EF= 30-35%  -Maintain on 1500 cc fluid restriction    -Continue daily weights, strict I/O  -Weights improving with diuresis, ? Accuracy weights  -CT:  Stable right pleural effusion, small left pleural effusion, persistent bibasilar atelectasis  -continue to hold diuretic for today  Reassess volume status tomorrow      6  HTN: now with hypotension  Goal SBP for age 80   -continue to monitor   -Avoid further hypotension    -Continue on metoprolol 12 5 mg with hold parameters       7  Constipation:  Family states patient did not have bowel movement since Saturday  Per nursing staff he had a bowel movement on Sunday   -Continues on Senokot daily   -p r n  MiraLax         SUBJECTIVE:    Bernarda Ortiz is sitting up in his bedside chair  His son is present at the bedside  He states that he was retaining urine yesterday and they inserted a Moralez catheter  He is not eating and drinking well  He still experiencing some abdominal pain  He remains on 2 L of supplemental oxygen, which has been his baseline  He denies any chest pain, increased shortness of breath, nausea, vomiting, or diarrhea  He states he has not had a bowel movement since Saturday  OBJECTIVE:  Current Weight: Weight - Scale: 84 9 kg (187 lb 2 7 oz)  Vitals:    07/09/18 2305 07/09/18 2325 07/10/18 0700 07/10/18 1021   BP:  114/56 102/55    BP Location:  Right arm Right arm    Pulse:  80 80    Resp:  22 20    Temp:  98 3 °F (36 8 °C) 97 5 °F (36 4 °C)    TempSrc:  Oral Oral    SpO2: 92% 96% 96% 94%   Weight:   84 9 kg (187 lb 2 7 oz)    Height:           Intake/Output Summary (Last 24 hours) at 07/10/18 1119  Last data filed at 07/10/18 0307   Gross per 24 hour   Intake                0 ml   Output             1578 ml   Net            -1578 ml     General: No apparent distress  Skin: warm, dry, intact, no rash  HEENT: Moist mucous membranes, sclera anicteric, normocephalic  Neck: No apparent JVD appreciated  Chest: Lung sounds decreased, on 2 L NC, shallow breathing     Heart: Regular rate and rhythm, No murmer  Abdomen: firm, round, slightly tender, hypoactive BS  Extremities: No B/L LE edema present  Neuro: Alert, forgetful, periods of confusion  Psych: Appropriate mood and affect    Medications:    Current Facility-Administered Medications:     acetaminophen (TYLENOL) tablet 650 mg, 650 mg, Oral, Q6H PRN, Shirlee Gitelman, MD, 650 mg at 07/01/18 2016    albuterol inhalation solution 2 5 mg, 2 5 mg, Nebulization, Q6H PRN, Shirlee Gitelman, MD, 2 5 mg at 07/01/18 1954    budesonide (PULMICORT) inhalation solution 0 5 mg, 0 5 mg, Nebulization, BID, Shirlee Gitelman, MD, 0 5 mg at 07/10/18 1006    ferrous sulfate tablet 325 mg, 325 mg, Oral, Daily With Breakfast, Mary Kate Fly, DO, 325 mg at 07/10/18 0956    HYDROcodone-acetaminophen (NORCO) 5-325 mg per tablet 1 tablet, 1 tablet, Oral, Q6H PRN, Ronnie Arredondo MD, 1 tablet at 18 1603    insulin glargine (LANTUS) subcutaneous injection 18 Units 0 18 mL, 18 Units, Subcutaneous, HS, Herminia Mueller MD, 18 Units at 18 212    insulin lispro (HumaLOG) 100 units/mL subcutaneous injection 1-5 Units, 1-5 Units, Subcutaneous, HS, ROMAINE Dillon, 2 Units at 18    insulin lispro (HumaLOG) 100 units/mL subcutaneous injection 1-6 Units, 1-6 Units, Subcutaneous, TID AC, 1 Units at 18 180 **AND** Fingerstick Glucose (POCT), , , TID AC, Ronnie Arredondo MD    insulin lispro (HumaLOG) 100 units/mL subcutaneous injection 5 Units, 5 Units, Subcutaneous, TID With Meals, Herminia Mueller MD, 5 Units at 07/10/18 0958    iohexol (OMNIPAQUE) 240 MG/ML solution 50 mL, 50 mL, Oral, Once in imaging, Ronnie Arredondo MD    ipratropium-albuterol (DUO-NEB) 0 5-2 5 mg/3 mL inhalation solution 3 mL, 3 mL, Nebulization, TID, Nestor Cruz, DO, 3 mL at 07/10/18 1006    latanoprost (XALATAN) 0 005 % ophthalmic solution 1 drop, 1 drop, Both Eyes, HS, Ronnie Arredondo MD, 1 drop at 18 210    levofloxacin (LEVAQUIN) tablet 250 mg, 250 mg, Oral, Q24H, Mary Kate Fly, DO, 250 mg at 18 154    menthol-methyl salicylate (BENGAY) 30-71 % cream, , Apply externally, 4x Daily PRN, ROMAINE Damico, 1 application at 2008    metoprolol succinate (TOPROL-XL) 24 hr tablet 12 5 mg, 12 5 mg, Oral, Daily, Mary Kate Fly, DO, 12 5 mg at 07/10/18 3696    multivitamin stress formula tablet 1 tablet, 1 tablet, Oral, Daily, Mary Kate Joseph DO, 1 tablet at 07/10/18 0980    ondansetron (ZOFRAN) injection 4 mg, 4 mg, Intravenous, Q6H PRN, Ronnie Arredondo MD    pantoprazole (PROTONIX) EC tablet 40 mg, 40 mg, Oral, Early Morning, Araceli Pulido MD, 40 mg at 07/10/18 3744    polyethylene glycol (MIRALAX) packet 17 g, 17 g, Oral, BID PRN, ROMAINE Eden    pravastatin (PRAVACHOL) tablet 40 mg, 40 mg, Oral, Daily With Gregg Carrillo MD, 40 mg at 07/09/18 1543    senna (SENOKOT) tablet 8 6 mg, 1 tablet, Oral, HS, Kassy Ascencio MD, 8 6 mg at 07/09/18 2109    traMADol (ULTRAM) tablet 50 mg, 50 mg, Oral, Q6H PRN, Santi Redman DO, 50 mg at 07/03/18 1735    Laboratory Results:    Results from last 7 days  Lab Units 07/10/18  0440 07/09/18  0509 07/08/18  0452 07/07/18  0442 07/06/18  0205 07/05/18  0609 07/04/18  0432   WBC Thousand/uL 10 71* 11 14* 12 43* 9 62 9 78  --  11 05*   HEMOGLOBIN g/dL 8 1* 8 1* 8 1* 7 9* 7 7* 7 5* 7 8*   HEMATOCRIT % 27 5* 27 2* 27 6* 26 6* 25 3* 24 8* 26 1*   PLATELETS Thousands/uL 232 237 243 211 203  --  207   SODIUM mmol/L 144 142 145 145 141 140 141   POTASSIUM mmol/L 3 7 3 8 3 6 4 1 3 9 4 1 4 3   CHLORIDE mmol/L 103 101 104 103 102 102 103   CO2 mmol/L 35* 34* 34* 36* 30 31 32   BUN mg/dL 76* 76* 71* 80* 90* 90* 86*   CREATININE mg/dL 2 36* 2 50* 1 92* 2 05* 2 25* 2 08* 2 21*   CALCIUM mg/dL 8 1* 7 9* 8 2* 8 3 8 4 8 3 7 9*   MAGNESIUM mg/dL  --   --   --   --   --   --  2 5   TOTAL PROTEIN g/dL  --   --   --   --  6 7  --   --    GLUCOSE RANDOM mg/dL 76 137 116 166* 142* 162* 201*

## 2018-07-10 NOTE — PROGRESS NOTES
Pt's son reiterated to twan (RN) that pt is excessively chewing his food then spits it out  Pt is able to swallow thin liquids with no choking or coughing  Pt grimaces when bolus is swallowed  When asked, pt doesn't confirm or deny painful or difficulty swallowing  Dr Toni Foley at bedside, abd distended since yesterday with no BM since 07/08/2018  Dr Toni Foley verbally ordered with readback for RN to modify miralax to BID scheduled  Per [facility] RN and orders, Speech consult was requested and placed on a pureed diet until evaluation is complete  Pt is exhibiting no signs of distress, communication is limited, but pt has ability to express his needs  Pt asked for water  Bed at lowest position, bedrails x 2  Call bell within reach and family at bedside

## 2018-07-10 NOTE — PROGRESS NOTES
Progress Note - Alirio Jiménez 80 y o  male MRN: [id number]  Unit/Bed#: [address] [place] [address] [id number] Encounter: [id number]    Assessment:  Principal Problem:    Acute erosive gastritis  Active Problems:    Acute kidney injury ([address] )    Chronic kidney disease, stage 3    Type 2 diabetes mellitus with stage 3 chronic kidney disease, without long-term current use of insulin (HCC)    Acute on chronic systolic congestive heart failure (HCC)    Acute blood loss anemia    Chronic respiratory failure with hypoxia (HCC)    Hypertension    COPD (chronic obstructive pulmonary disease) (HCC)    Contusion of flank    Hyperlipidemia    Cancer (HCC)    ATN (acute tubular necrosis) (HCC)    Parenchymal renal hypertension    Benign prostatic hyperplasia with urinary retention    Acute pain of left shoulder    Constipation  Resolved Problems:    * No resolved hospital problems  *      Plan:  · GI bleed with acute blood loss anemia due to acute gastritis  H&H is stable  GI follow-up noted  Continue on Protonix  · Acute kidney injury in chronic kidney disease stage 3/4  Patient baseline creatinine is 1 5  Creatinine is down to 2 36  Continue to hold Lasix  Nephrology follow-up  Avoid hypotension/nephrotoxins medication  · Acute on chronic systolic heart failure and hypertension  Daily weight and I&Os  Will hold Lasix secondary to worsening kidney function  Continue on statin, Toprol-XL  · Diabetes mellitus  Continue on Lantus insulin  Accu-Chek a c  HS with Humalog sliding scale  · BPH-on Flomax  CT abdomen pelvis showed mild hydro  Urology consult  · DVT prophylaxis  · Discussed with patient and his son at length  Subjective:   Patient is seen and examined at bedside  Denies any new complaints  Denies any abdominal pain  Afebrile  All other ROS are negative  Objective:   Vitals: Blood pressure 102/55, pulse 80, temperature 97 5 °F (36 4 °C), temperature source Oral, resp   rate 20, height 5' 6" (1 676 m), weight 84 9 kg (187 lb 2 7 oz), SpO2 94 %  ,Body mass index is 30 21 kg/m²  SPO2 RA Rest      ED to Hosp-Admission (Current) from 7/1/2018 in [facility]   SpO2  94 %   SpO2 Activity  At Rest   O2 Device  Nasal cannula   O2 Flow Rate  2 L/min        I&O:   Intake/Output Summary (Last 24 hours) at 07/10/18 1150  Last data filed at 07/10/18 1015   Gross per 24 hour   Intake                0 ml   Output             2028 ml   Net            -2028 ml       Physical Exam:    General- Alert, lying comfortably in bed  Not in any acute distress  HEENT- ARNOLDO, EOM intact  Neck- Supple, No JVD  CVS- regular, S1 and S2 normal   Chest- Bilateral Air entry, No rhochi, crackles or wheezing present  Abdomen- soft, nontender, not distended, no guarding or rigidity, BS+  Extremities-  No pedal edema, No calf tenderness  CNS-  No focal deficits present      Invasive Devices     Peripheral Intravenous Line            Peripheral IV 07/09/18 Left Arm 1 day          Drain            External Urinary Catheter Large 2 days    Urethral Catheter 16 Fr  less than 1 day                      Social History  reviewed  Family History   Problem Relation Age of Onset    COPD Father     reviewed    Meds:  Current Facility-Administered Medications   Medication Dose Route Frequency Provider Last Rate Last Dose    acetaminophen (TYLENOL) tablet 650 mg  650 mg Oral Q6H PRN Tru Lopez MD   650 mg at 07/01/18 2016    albuterol inhalation solution 2 5 mg  2 5 mg Nebulization Q6H PRN Tru Lopez MD   2 5 mg at 07/01/18 1954    budesonide (PULMICORT) inhalation solution 0 5 mg  0 5 mg Nebulization BID Tru Lopez MD   0 5 mg at 07/10/18 1006    ferrous sulfate tablet 325 mg  325 mg Oral Daily With Breakfast Mary Kate Fly, DO   325 mg at 07/10/18 0956    HYDROcodone-acetaminophen (NORCO) 5-325 mg per tablet 1 tablet  1 tablet Oral Q6H PRN Tru Lopez MD   1 tablet at 07/09/18 1603    insulin glargine (LANTUS) subcutaneous injection 18 Units 0 18 mL  18 Units Subcutaneous HS Son Hood MD   18 Units at 07/09/18 2121    insulin lispro (HumaLOG) 100 units/mL subcutaneous injection 1-5 Units  1-5 Units Subcutaneous HS ROMAINE Dillon   2 Units at 07/06/18 2114    insulin lispro (HumaLOG) 100 units/mL subcutaneous injection 1-6 Units  1-6 Units Subcutaneous TID AC Jerrod Henry MD   1 Units at 07/09/18 1804    insulin lispro (HumaLOG) 100 units/mL subcutaneous injection 5 Units  5 Units Subcutaneous TID With Meals Son Hood MD   5 Units at 07/10/18 0958    iohexol (OMNIPAQUE) 240 MG/ML solution 50 mL  50 mL Oral Once in imaging Jerrod Henry MD        ipratropium-albuterol (DUO-NEB) 0 5-2 5 mg/3 mL inhalation solution 3 mL  3 mL Nebulization TID Debra Guerrero DO   3 mL at 07/10/18 1006    latanoprost (XALATAN) 0 005 % ophthalmic solution 1 drop  1 drop Both Eyes HS Jerrod Henry MD   1 drop at 07/09/18 2109    levofloxacin (LEVAQUIN) tablet 250 mg  250 mg Oral Q24H Mary Kate Fly, DO   250 mg at 07/09/18 1544    menthol-methyl salicylate (BENGAY) 33-31 % cream   Apply externally 4x Daily PRN ROMAINE Basurto   1 application at 25/45/02 2008    metoprolol succinate (TOPROL-XL) 24 hr tablet 12 5 mg  12 5 mg Oral Daily Mary Kate Fly, DO   12 5 mg at 07/10/18 0956    multivitamin stress formula tablet 1 tablet  1 tablet Oral Daily Mary Kate Fly, DO   1 tablet at 07/10/18 0959    ondansetron (ZOFRAN) injection 4 mg  4 mg Intravenous Q6H PRN Jerrod Henry MD        pantoprazole (PROTONIX) EC tablet 40 mg  40 mg Oral Early Morning Yusra Newton MD   40 mg at 07/10/18 6474    polyethylene glycol (MIRALAX) packet 17 g  17 g Oral BID PRN ROMAINE Clayton        pravastatin (PRAVACHOL) tablet 40 mg  40 mg Oral Daily With Xiao Elizabeth MD   40 mg at 07/09/18 1543    senna (SENOKOT) tablet 8 6 mg  1 tablet Oral HS Son Hood MD   8 6 mg at 07/09/18 2109    tamsulosin (FLOMAX) capsule 0 4 mg 0 4 mg Oral Daily With Jignesh Manuel MD        traMADol Felicity Schillings) tablet 50 mg  50 mg Oral Q6H PRN Mary Kate Fly, DO   50 mg at 18 1735      Prescriptions Prior to Admission   Medication    acetaminophen (TYLENOL) 650 mg suppository    albuterol (2 5 mg/3 mL) 0 083 % nebulizer solution    aspirin 81 MG tablet    budesonide (PULMICORT) 0 5 mg/2 mL nebulizer solution    docusate sodium (COLACE) 100 mg capsule    famotidine (PEPCID) 20 mg tablet    furosemide (LASIX) 40 mg tablet    glimepiride (AMARYL) 1 mg tablet    latanoprost (XALATAN) 0 005 % ophthalmic solution    metolazone (ZAROXOLYN) 5 mg tablet    metoprolol succinate (TOPROL-XL) 25 mg 24 hr tablet    polyethylene glycol (MIRALAX) 17 g packet    [] predniSONE 10 mg tablet    senna (SENOKOT) 8 6 MG tablet    simvastatin (ZOCOR) 20 mg tablet    sodium chloride (OCEAN) 0 65 % nasal spray    spironolactone (ALDACTONE) 25 mg tablet    umeclidinium-vilanterol (ANORO ELLIPTA) 62 5-25 MCG/INH inhaler    acetaminophen-codeine (TYLENOL #3) 300-30 mg per tablet    cephalexin (KEFLEX) 500 mg capsule       Labs:    Results from last 7 days  Lab Units 07/10/18  0440 07/09/18  0509 18  0452   WBC Thousand/uL 10 71* 11 14* 12 43*   HEMOGLOBIN g/dL 8 1* 8 1* 8 1*   HEMATOCRIT % 27 5* 27 2* 27 6*   PLATELETS Thousands/uL 232 237 243   NEUTROS PCT % 85* 84*  --    LYMPHS PCT % 5* 5*  --    MONOS PCT % 7 8  --    EOS PCT % 2 2  --        Results from last 7 days  Lab Units 07/10/18  0440 18  0509 18  0452  18  0205   SODIUM mmol/L 144 142 145  < > 141   POTASSIUM mmol/L 3 7 3 8 3 6  < > 3 9   CHLORIDE mmol/L 103 101 104  < > 102   CO2 mmol/L 35* 34* 34*  < > 30   BUN mg/dL 76* 76* 71*  < > 90*   CREATININE mg/dL 2 36* 2 50* 1 92*  < > 2 25*   CALCIUM mg/dL 8 1* 7 9* 8 2*  < > 8 4   TOTAL PROTEIN g/dL  --   --   --   --  6 7   BILIRUBIN TOTAL mg/dL  --   --   --   --  0 40   ALK PHOS U/L  --   --   --   --  63   ALT U/L  --   --   --   --  23   AST U/L  --   --   --   --  25   GLUCOSE RANDOM mg/dL 76 137 116  < > 142*   < > = values in this interval not displayed  Lab Results   Component Value Date    TROPONINI 0 09 (H) 07/03/2018    TROPONINI 0 08 (H) 07/03/2018    TROPONINI 0 10 (H) 07/03/2018    CKTOTAL 56 07/01/2018         Lab Results   Component Value Date    SPUTUMCULTUR 3+ Growth of Pseudomonas aeruginosa (A) 07/05/2018    SPUTUMCULTUR 3+ Growth of  07/05/2018         Imaging:  Results for orders placed during the hospital encounter of 07/01/18   XR chest portable    Narrative CHEST     INDICATION:   Shortness of breath  Congestive failure       COMPARISON:  None    EXAM PERFORMED/VIEWS:  XR CHEST PORTABLE      FINDINGS:  There are median sternotomy wires indicating prior cardiac surgery  Dual-lead left chest pacemaker is noted prosthetic cardiac valve is seen  Heart is enlarged  Atherosclerotic vascular calcifications are noted  Mild pulmonary vascular congestive changes  Left lung base is poorly evaluated there appears to be a left pleural effusion  Trace costophrenic angle effusion on the right  Relative increased parenchymal density in the left perihilar region of the right lower lobe raises the suspicion of either   atelectasis or pneumonia  Osseous structures appear within normal limits for patient age  Impression Cardiomegaly  Mild pulmonary vascular congestive changes  Left greater than right pleural effusions  Relative increased parenchymal density in the left perihilar region and right lower lobe could represent atelectasis or small patchy areas of   pneumonia  Workstation performed: [id number]       Results for orders placed during the hospital encounter of 07/01/18   XR chest pa & lateral    Narrative CHEST     INDICATION:   CHF    Shortness of breath    COMPARISON:  7/6/2018    EXAM PERFORMED/VIEWS:  XR CHEST PA & LATERAL      FINDINGS:  There are median sternotomy wires indicating prior cardiac surgery  Heart shadow is enlarged but unchanged from prior exam   Transvenous pacemaker leads are unchanged in position  There is a prosthetic heart valve  There are small bilateral effusions left greater than right  There is pulmonary congestion  Findings are similar to the prior    Osseous structures appear within normal limits for patient age  Impression Pulmonary congestion with small effusions  Overall findings are similar to the prior study        Workstation performed: [id number]: I have personally reviewed pertinent reports        VTE Pharmacologic Prophylaxis: Reason for no pharmacologic prophylaxis GI bleed  VTE Mechanical Prophylaxis: sequential compression device    Code Status:   Level 1 - Full Code      "This note has been constructed using a voice recognition system"      Hilda Rodriguez MD  7/10/2018,11:50 AM

## 2018-07-10 NOTE — CONSULTS
Consult - Urology   Deny Nguyen 80 y o  male MRN: [id number]  Unit/Bed#: [address] [place] [address] 202-01 Encounter: [id number]    Assessment/Plan     Assessment:  Urinary retention with minimal hydronephrosis  Constipation    Plan:  Maintain indwelling Moralez catheter at this time  Continue Flomax  Recommend addition of finasteride 5 mg daily and a bowel regimen  Follow up as an outpatient    History of Present Illness   HPI:  Deny Nguyen is a 80 y o  male who presented melena, dyspnea on exertion, and generalized weakness  He was found to have a low hemoglobin and elevated creatinine  He initially had a Moralez catheter placed and CT scan done on 07/01 showed no hydronephrosis and an unremarkable bladder  After Moralez catheter was removed, patient had several large postvoid residual volumes, one measuring greater than 1000 mL  An indwelling catheter was placed today  CT scan done yesterday showed diffuse bladder distention with minimal hydronephrosis  He has been placed on Flomax and we will add finasteride  Review of Systems   Constitutional: Positive for fatigue  Negative for chills and fever  HENT: Negative for congestion, ear pain, hearing loss, postnasal drip, sinus pressure and sore throat  Eyes: Negative for pain, redness and visual disturbance  Respiratory: Positive for shortness of breath  Negative for cough, chest tightness and wheezing  Cardiovascular: Negative for chest pain, palpitations and leg swelling  Gastrointestinal: Positive for abdominal distention and constipation  Negative for abdominal pain, diarrhea, nausea and vomiting  Genitourinary: Positive for difficulty urinating  Negative for dysuria, frequency, hematuria and urgency  Musculoskeletal: Negative for arthralgias, back pain, joint swelling and myalgias  Skin: Negative for rash and wound  Neurological: Positive for dizziness, weakness and light-headedness  Negative for numbness and headaches     Hematological: Negative for adenopathy  Psychiatric/Behavioral: Negative for dysphoric mood  The patient is not nervous/anxious  Historical Information   Past Medical History:   Diagnosis Date    Aortic regurgitation     Atrial fibrillation (HCC)     Cancer (HCC)     colon    CHF (congestive heart failure) (HCC)     Complete heart block (HCC)     post CABG    COPD (chronic obstructive pulmonary disease) (HCC)     Coronary artery disease     MI    Diabetes mellitus ([facility] [address] )     Dissecting aortic aneurysm (HCC)     Endocarditis     Hx of bleeding disorder     GI bleed on aspirin    Hypertension     Mitral regurgitation     Skin cancer      Past Surgical History:   Procedure Laterality Date    AORTIC VALVE REPLACEMENT      CARDIAC PACEMAKER PLACEMENT      [employer]     CARDIAC SURGERY      CABG on physician notes, none per pt   CHOLECYSTECTOMY      COLECTOMY      ESOPHAGOGASTRODUODENOSCOPY N/A 7/2/2018    Procedure: ESOPHAGOGASTRODUODENOSCOPY (EGD); Surgeon: Yusra Newton MD;  Location: [facility];  Service: Gastroenterology    FLAP LOCAL HEAD / NECK N/A 5/19/2016    Procedure: VERSUS FLAP ;  Surgeon: Boris Joy MD;  [facility];  Service:     JOINT REPLACEMENT Left     hip    MITRAL VALVE REPLACEMENT      MOHS RECONSTRUCTION N/A 5/19/2016    Procedure: RECONSTRUCTION MOHS DEFECT NOSE AND SCALP;  Surgeon: Boris Joy MD;  Location: [facility];  Service:    Alf Camargo SKIN BIOPSY       Social History   History   Alcohol Use    Yes     Comment: rarely     History   Drug Use No     History   Smoking Status    Former Smoker    Packs/day: 1 00    Years: 20 00    Types: Cigarettes    Quit date: 1960   Smokeless Tobacco    Former User    Quit date: 1970     Family History:   Family History   Problem Relation Age of Onset    COPD Father        Meds/Allergies   PTA meds:   Prior to Admission Medications   Prescriptions Last Dose Informant Patient Reported?  Taking?   acetaminophen (TYLENOL) 650 mg suppository   Yes Yes   Sig: Insert 650 mg into the rectum every 4 (four) hours as needed for mild pain   acetaminophen-codeine (TYLENOL #3) 300-30 mg per tablet   No No   Sig: Take 1 tablet by mouth every 4 (four) hours as needed for moderate pain for up to 20 doses  albuterol (2 5 mg/3 mL) 0 083 % nebulizer solution   Yes Yes   Sig: Take 2 5 mg by nebulization every 6 (six) hours as needed for wheezing or shortness of breath   aspirin 81 MG tablet   Yes Yes   Sig: Take 81 mg by mouth daily  budesonide (PULMICORT) 0 5 mg/2 mL nebulizer solution   Yes Yes   Sig: Take 0 5 mg by nebulization 2 (two) times a day Rinse mouth after use  cephalexin (KEFLEX) 500 mg capsule   Yes No   Sig: Take 500 mg by mouth  4 capsules 1 hour before surgery   docusate sodium (COLACE) 100 mg capsule   Yes Yes   Sig: Take 100 mg by mouth as needed for constipation  famotidine (PEPCID) 20 mg tablet   Yes Yes   Sig: Take 20 mg by mouth daily  furosemide (LASIX) 40 mg tablet   Yes Yes   Sig: Take 40 mg by mouth 2 (two) times a day  glimepiride (AMARYL) 1 mg tablet   Yes Yes   Sig: Take 1 mg by mouth every morning before breakfast  1/2 tablet   latanoprost (XALATAN) 0 005 % ophthalmic solution   Yes Yes   Si drop daily at bedtime  metolazone (ZAROXOLYN) 5 mg tablet   Yes Yes   Sig: Take 5 mg by mouth daily   metoprolol succinate (TOPROL-XL) 25 mg 24 hr tablet   Yes Yes   Sig: Take 25 mg by mouth daily  polyethylene glycol (MIRALAX) 17 g packet   Yes Yes   Sig: Take 17 g by mouth daily as needed   predniSONE 10 mg tablet   Yes Yes   Sig: Take 10 mg by mouth daily   senna (SENOKOT) 8 6 MG tablet   Yes Yes   Sig: Take 1 tablet by mouth as needed for constipation  simvastatin (ZOCOR) 20 mg tablet   Yes Yes   Sig: Take 20 mg by mouth daily at bedtime     sodium chloride (OCEAN) 0 65 % nasal spray   Yes Yes   Si spray into each nostril 2 (two) times a day   spironolactone (ALDACTONE) 25 mg tablet   Yes Yes Sig: Take 25 mg by mouth daily   umeclidinium-vilanterol (ANORO ELLIPTA) 62 5-25 MCG/INH inhaler   Yes Yes   Sig: Inhale 1 puff daily      Facility-Administered Medications: None     Allergies   Allergen Reactions    Plavix [Clopidogrel Bisulfate] GI Bleeding       Objective   Vitals: Blood pressure 102/55, pulse 80, temperature 97 5 °F (36 4 °C), temperature source Oral, resp  rate 20, height 5' 6" (1 676 m), weight 84 9 kg (187 lb 2 7 oz), SpO2 96 %  I/O last 24 hours: In: -   Out: 2028 [Urine:2028]    Invasive Devices     Peripheral Intravenous Line            Peripheral IV 07/09/18 Left Arm 1 day          Drain            External Urinary Catheter Large 2 days    Urethral Catheter 16 Fr  less than 1 day                Physical Exam   Constitutional: He is oriented to person, place, and time  He appears well-developed and well-nourished  Pale   HENT:   Head: Normocephalic and atraumatic  Mouth/Throat: Oropharynx is clear and moist    Eyes: EOM are normal  Pupils are equal, round, and reactive to light  Neck: Neck supple  Cardiovascular: Normal rate and regular rhythm  No murmur heard  Pulmonary/Chest: Breath sounds normal  He has no wheezes  He has no rales  Abdominal: Soft  Bowel sounds are normal  He exhibits distension  There is no tenderness  There is no rebound and no guarding  Genitourinary:   Genitourinary Comments: Indwelling catheter in place draining clear, yellow urine   Musculoskeletal: Normal range of motion  He exhibits no edema  Lymphadenopathy:     He has no cervical adenopathy  Neurological: He is alert and oriented to person, place, and time  No cranial nerve deficit  Skin: Skin is warm  No rash noted  No erythema     Psychiatric:   Resting with eyes closed       Lab Results:   CBC:   Lab Results   Component Value Date    WBC 10 71 (H) 07/10/2018    HGB 8 1 (L) 07/10/2018    HCT 27 5 (L) 07/10/2018    MCV 92 07/10/2018     07/10/2018    MCH 27 1 07/10/2018 MCHC 29 5 (L) 07/10/2018    RDW 16 5 (H) 07/10/2018    MPV 10 6 07/10/2018    NRBC 0 07/10/2018     CMP:   Lab Results   Component Value Date     07/10/2018     07/10/2018    CO2 35 (H) 07/10/2018    ANIONGAP 6 07/10/2018    BUN 76 (H) 07/10/2018    CREATININE 2 36 (H) 07/10/2018    GLUCOSE 76 07/10/2018    CALCIUM 8 1 (L) 07/10/2018    EGFR 23 07/10/2018     Urinalysis: No results found for: Elesa Irwin, SPECGRAV, PHUR, LEUKOCYTESUR, NITRITE, PROTEINUA, GLUCOSEU, KETONESU, BILIRUBINUR, BLOODU  Urine Culture: No results found for: URINECX  Imaging: I have personally reviewed pertinent reports  VTE Prophylaxis: Sequential compression device (Venodyne)       Counseling / Coordination of Care  Total floor / unit time spent today 40 minutes  Greater than 50% of total time was spent with the patient and / or family counseling and / or coordination of care    A description of the counseling / coordination of care:

## 2018-07-10 NOTE — PROGRESS NOTES
Pt denies pain, SOB  Respirations greatly improved from tachypneic shallow grunting breaths (pre-pain medication intervention) to normal respirations with lower volume grunting  Pt no longer appears in distress and denies further abdominal pain at this time  Pt's BG @ 2100 check = 92  Confirmed basal coverage of Lantus 18u with Liban Bishop PA:  Ok to give full basal coverage as ordered

## 2018-07-10 NOTE — PROGRESS NOTES
Nikki Castro  [id number]    64 y o   male      ASSESSMENT  1   Symptomatic anemia with melena   EGD 7/2 showed erosive gastritis and a small hiatal hernia  + intestinal metaplasia of stomach  H P negative   No overt bleeding site identified  Pk Wilson remains stable or actually up slightly at 8 1 today   2   Acute anemia probably acute blood loss anemia  3   GERD on Pepcid  4   Distant history of iron deficiency anemia and occult GI bleeding   S/P EGD/colonoscopy/capsule endoscopy 2007   Treated with a year course of oral iron  5   Distant history of peptic ulcer disease  6   Distant history of colon cancer  7   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  with congestive heart failure followed by Dr Paul Mcgarry  8   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  9   H/O AAA  10   DM  11  History of abdominal distention and tenderness due to diffuse bladder distension - relieved following catheterization       PLAN  1   Follow H&H and transfuse as needed  2   Watch for overt GI blood loss  3   Continue Protonix  4   Would hold off on colonoscopy as I do not think he would tolerate the procedure; I believe the risks would outweigh the benefits unless overt bleeding noted    Chief Complaint   Patient presents with    Fall     Patient sent in from [facility] after falling out of his wheelchair today  Staff states this is the 3rd time he has done this in 2 days  Patietn initially complaisned of back pain but none upon arrival       [address] [facility]   Patient denies any complaints, having bowel movements  Lethargic but oriented  Denies any GI complaints  Appetite sub optimal     /55 (BP Location: Right arm)   Pulse 80   Temp 97 5 °F (36 4 °C) (Oral)   Resp 20   Ht 5' 6" (1 676 m)   Wt 82 6 kg (182 lb 1 6 oz)   SpO2 96%   BMI 29 39 kg/m²       PHYSICALEXAM  Constitutional:  Elderly, frail, weak and lethargic  Does not appear to be in distress    Eyes:  conjunctiva pale   HENT:  Atraumatic  Respiratory:  No respiratory distress  Cardiovascular:  Normal rate   GI:  Soft, nondistended, normal bowel sounds, nontender  Neurologic:  Alert & oriented x 2  Psychiatric:  Speech and behavior appropriate       Lab Results   Component Value Date    GLUCOSE 76 07/10/2018    CALCIUM 8 1 (L) 07/10/2018     07/10/2018    K 3 7 07/10/2018    CO2 35 (H) 07/10/2018     07/10/2018    BUN 76 (H) 07/10/2018    CREATININE 2 36 (H) 07/10/2018     Lab Results   Component Value Date    WBC 10 71 (H) 07/10/2018    HGB 8 1 (L) 07/10/2018    HCT 27 5 (L) 07/10/2018    MCV 92 07/10/2018     07/10/2018     Lab Results   Component Value Date    ALT 23 07/06/2018    AST 25 07/06/2018    ALKPHOS 63 07/06/2018    BILITOT 0 40 07/06/2018     No results found for: AMYLASE  No results found for: LIPASE  Lab Results   Component Value Date    IRON 18 (L) 07/02/2018    TIBC 423 07/02/2018    FERRITIN 47 07/02/2018     Lab Results   Component Value Date    INR 1 17 07/01/2018       Counseling / Coordination of Care  Total floor / unit time spent today 25 minutes  Greater than 50% of total time was spent with the patient and / or family counseling and / or coordination of care  A description of the counseling / coordination of care: Erica Jackson

## 2018-07-11 LAB
ANION GAP SERPL CALCULATED.3IONS-SCNC: 4 MMOL/L (ref 4–13)
BASOPHILS # BLD AUTO: 0.01 THOUSANDS/ΜL (ref 0–0.1)
BASOPHILS NFR BLD AUTO: 0 % (ref 0–1)
BUN SERPL-MCNC: 70 MG/DL (ref 5–25)
CALCIUM SERPL-MCNC: 8 MG/DL (ref 8.3–10.1)
CHLORIDE SERPL-SCNC: 106 MMOL/L (ref 100–108)
CO2 SERPL-SCNC: 36 MMOL/L (ref 21–32)
CREAT SERPL-MCNC: 1.96 MG/DL (ref 0.6–1.3)
EOSINOPHIL # BLD AUTO: 0.16 THOUSAND/ΜL (ref 0–0.61)
EOSINOPHIL NFR BLD AUTO: 2 % (ref 0–6)
ERYTHROCYTE [DISTWIDTH] IN BLOOD BY AUTOMATED COUNT: 16.4 % (ref 11.6–15.1)
GFR SERPL CREATININE-BSD FRML MDRD: 29 ML/MIN/1.73SQ M
GLUCOSE SERPL-MCNC: 147 MG/DL (ref 65–140)
GLUCOSE SERPL-MCNC: 67 MG/DL (ref 65–140)
GLUCOSE SERPL-MCNC: 77 MG/DL (ref 65–140)
GLUCOSE SERPL-MCNC: 81 MG/DL (ref 65–140)
GLUCOSE SERPL-MCNC: 94 MG/DL (ref 65–140)
HCT VFR BLD AUTO: 27.1 % (ref 36.5–49.3)
HGB BLD-MCNC: 7.9 G/DL (ref 12–17)
IMM GRANULOCYTES # BLD AUTO: 0.04 THOUSAND/UL (ref 0–0.2)
IMM GRANULOCYTES NFR BLD AUTO: 0 % (ref 0–2)
LYMPHOCYTES # BLD AUTO: 0.48 THOUSANDS/ΜL (ref 0.6–4.47)
LYMPHOCYTES NFR BLD AUTO: 4 % (ref 14–44)
MCH RBC QN AUTO: 27 PG (ref 26.8–34.3)
MCHC RBC AUTO-ENTMCNC: 29.2 G/DL (ref 31.4–37.4)
MCV RBC AUTO: 93 FL (ref 82–98)
MONOCYTES # BLD AUTO: 0.7 THOUSAND/ΜL (ref 0.17–1.22)
MONOCYTES NFR BLD AUTO: 6 % (ref 4–12)
NEUTROPHILS # BLD AUTO: 9.48 THOUSANDS/ΜL (ref 1.85–7.62)
NEUTS SEG NFR BLD AUTO: 88 % (ref 43–75)
NRBC BLD AUTO-RTO: 0 /100 WBCS
PLATELET # BLD AUTO: 227 THOUSANDS/UL (ref 149–390)
PMV BLD AUTO: 10.4 FL (ref 8.9–12.7)
POTASSIUM SERPL-SCNC: 3.5 MMOL/L (ref 3.5–5.3)
RBC # BLD AUTO: 2.93 MILLION/UL (ref 3.88–5.62)
SODIUM SERPL-SCNC: 146 MMOL/L (ref 136–145)
WBC # BLD AUTO: 10.87 THOUSAND/UL (ref 4.31–10.16)

## 2018-07-11 PROCEDURE — 99232 SBSQ HOSP IP/OBS MODERATE 35: CPT | Performed by: INTERNAL MEDICINE

## 2018-07-11 PROCEDURE — 92610 EVALUATE SWALLOWING FUNCTION: CPT

## 2018-07-11 PROCEDURE — 80048 BASIC METABOLIC PNL TOTAL CA: CPT | Performed by: INTERNAL MEDICINE

## 2018-07-11 PROCEDURE — 94760 N-INVAS EAR/PLS OXIMETRY 1: CPT

## 2018-07-11 PROCEDURE — 85025 COMPLETE CBC W/AUTO DIFF WBC: CPT | Performed by: INTERNAL MEDICINE

## 2018-07-11 PROCEDURE — 97530 THERAPEUTIC ACTIVITIES: CPT

## 2018-07-11 PROCEDURE — 94640 AIRWAY INHALATION TREATMENT: CPT

## 2018-07-11 PROCEDURE — 82948 REAGENT STRIP/BLOOD GLUCOSE: CPT

## 2018-07-11 RX ADMIN — BUDESONIDE 0.5 MG: 0.5 INHALANT RESPIRATORY (INHALATION) at 08:46

## 2018-07-11 RX ADMIN — IPRATROPIUM BROMIDE AND ALBUTEROL SULFATE 3 ML: 2.5; .5 SOLUTION RESPIRATORY (INHALATION) at 08:46

## 2018-07-11 RX ADMIN — FINASTERIDE 5 MG: 5 TABLET, FILM COATED ORAL at 08:17

## 2018-07-11 RX ADMIN — POLYETHYLENE GLYCOL 3350 17 G: 17 POWDER, FOR SOLUTION ORAL at 08:18

## 2018-07-11 RX ADMIN — ZINC 1 TABLET: TAB ORAL at 08:16

## 2018-07-11 RX ADMIN — SENNOSIDES 8.6 MG: 8.6 TABLET, FILM COATED ORAL at 21:29

## 2018-07-11 RX ADMIN — METOPROLOL SUCCINATE 12.5 MG: 25 TABLET, EXTENDED RELEASE ORAL at 08:17

## 2018-07-11 RX ADMIN — TAMSULOSIN HYDROCHLORIDE 0.4 MG: 0.4 CAPSULE ORAL at 16:59

## 2018-07-11 RX ADMIN — FERROUS SULFATE TAB 325 MG (65 MG ELEMENTAL FE) 325 MG: 325 (65 FE) TAB at 08:17

## 2018-07-11 RX ADMIN — IPRATROPIUM BROMIDE AND ALBUTEROL SULFATE 3 ML: 2.5; .5 SOLUTION RESPIRATORY (INHALATION) at 14:52

## 2018-07-11 RX ADMIN — PANTOPRAZOLE SODIUM 40 MG: 40 TABLET, DELAYED RELEASE ORAL at 05:40

## 2018-07-11 RX ADMIN — INSULIN GLARGINE 18 UNITS: 100 INJECTION, SOLUTION SUBCUTANEOUS at 21:29

## 2018-07-11 RX ADMIN — LATANOPROST 1 DROP: 50 SOLUTION OPHTHALMIC at 21:29

## 2018-07-11 RX ADMIN — LEVOFLOXACIN 250 MG: 250 TABLET, FILM COATED ORAL at 16:59

## 2018-07-11 RX ADMIN — BUDESONIDE 0.5 MG: 0.5 INHALANT RESPIRATORY (INHALATION) at 21:09

## 2018-07-11 RX ADMIN — IPRATROPIUM BROMIDE AND ALBUTEROL SULFATE 3 ML: 2.5; .5 SOLUTION RESPIRATORY (INHALATION) at 21:10

## 2018-07-11 RX ADMIN — PRAVASTATIN SODIUM 40 MG: 40 TABLET ORAL at 16:59

## 2018-07-11 NOTE — PHYSICAL THERAPY NOTE
PHYSICAL THERAPY NOTE          Patient Name: Joseline Shaw  LHYOG'J Date: 7/11/2018 07/11/18 1700   Pain Assessment   Pain Assessment No/denies pain   Restrictions/Precautions   Other Precautions Cognitive;O2;Fall Risk  (chair alarm placed and activated at the end of session)   General   Chart Reviewed Yes   Additional Pertinent History cleared for Tx session (spoke to nsg)   Family/Caregiver Present Yes  (pre and post activities)   Cognition   Overall Cognitive Status Impaired   Arousal/Participation Alert   Attention Attends with cues to redirect   Orientation Level Oriented to person;Oriented to situation   Memory Decreased recall of recent events   Following Commands Follows one step commands without difficulty   Subjective   Subjective Pt is observed in bed; awake and alert; responds to questions appropriately; pleasant and cooperative; agreeable to perform therex and mobilize OOB   Bed Mobility   Rolling L 3  Moderate assistance   Additional items Assist x 2; Increased time required;Verbal cues;LE management   Supine to Sit 3  Moderate assistance   Additional items Assist x 1;HOB elevated; Increased time required;Verbal cues;LE management  (via partial log rolling)   Transfers   Sit to Stand 3  Moderate assistance   Additional items Assist x 1; Increased time required;Verbal cues  (stand by (A) of 2nd)   Stand to Sit 3  Moderate assistance   Additional items Assist x 2;Verbal cues   Stand pivot 3  Moderate assistance   Additional items Increased time required;Verbal cues; Assist x 1  (stand by (A) of 2nd; took a few steps from bed to chair )   Additional Comments pillow placed for back and (R) UE support; (B) SCDs placed back on and activated; call bell w/in reach; chair alarm on; nsg aware   Ambulation/Elevation   Gait pattern Step to;Short stride; Inconsistent shirley   Gait Assistance 3  Moderate assist   Additional items Assist x 2;Verbal cues; Tactile cues   Assistive Device Rolling walker   Distance 3 ft total distance for bed to chair transition; no overt knee buckling   Balance   Static Sitting Fair -   Static Standing Poor +   Dynamic Standing Poor   Ambulatory Poor   Activity Tolerance   Activity Tolerance Patient tolerated treatment well   Nurse Made Aware spoke to Sheila palacios RN   Exercises   Knee AROM Short Arc Quad Supine;15 reps;AAROM; Bilateral   Ankle Pumps Supine;15 reps;AAROM;AROM; Bilateral   Assessment   Prognosis Fair   Problem List Decreased strength;Decreased endurance; Impaired balance;Decreased mobility; Decreased cognition   Assessment Pt demonstrated min overall improvement in functional mobility and tolerance to activities this session; mod (A)x 1-2 was required for OOB to chair transition w/ rw; rest periods provided as needed t/o the session; pt remained in NAD and appeared to be comfortable at the end of session; currently, cont to recommend rehab upon D/C when medically cleared; will follow  Goals   Patient Goals none reported   STG Expiration Date 07/16/18   Treatment Day 5   Plan   Treatment/Interventions Functional transfer training;LE strengthening/ROM; Therapeutic exercise; Endurance training;Cognitive reorientation; Bed mobility;Gait training;Spoke to nursing;Family   Progress Progressing toward goals   PT Frequency Other (Comment)  (3-5x/wk)   Recommendation   Recommendation Post acute IP rehab   Equipment Recommended Destini Tavarez  (w/ (A))       Gilford Golden, AMANDA

## 2018-07-11 NOTE — SPEECH THERAPY NOTE
Speech Language/Pathology  Speech/Language Pathology  Assessment    Patient Name: Juan Antonio Franklin  OITNT'O Date: 7/11/2018     Problem List  Patient Active Problem List   Diagnosis    [facility] ([facility] )    Chronic kidney disease, stage 3    Type 2 diabetes mellitus with stage 3 chronic kidney disease, without long-term current use of insulin (HCC)    Acute on chronic systolic congestive heart failure (HCC)    Acute blood loss anemia    [facility] respiratory failure with hypoxia [facility] )    Hypertension    COPD (chronic obstructive pulmonary disease) [facility]    Contusion of flank    Hyperlipidemia    Cancer [facility]    ATN (acute tubular necrosis) (HCC)    Parenchymal renal hypertension    Benign prostatic hyperplasia with urinary retention    Acute pain of left shoulder    Acute erosive gastritis    Constipation     Past Medical History  Past Medical History:   Diagnosis Date    Aortic regurgitation     Atrial fibrillation (HCC)     Cancer (HCC)     colon    CHF (congestive heart failure) (HCC)     Complete heart block (HCC)     post CABG    COPD (chronic obstructive [facility] disease) [facility]     Coronary artery disease     MI    Diabetes mellitus ([facility] )     Dissecting aortic aneurysm (HCC)     Endocarditis     Hx of bleeding disorder     GI bleed on aspirin    Hypertension     Mitral regurgitation     Skin cancer      Past Surgical History  Past Surgical History:   Procedure Laterality Date    AORTIC VALVE REPLACEMENT      CARDIAC PACEMAKER PLACEMENT      [employer]     CARDIAC SURGERY      CABG on physician notes, none per pt   CHOLECYSTECTOMY      COLECTOMY      ESOPHAGOGASTRODUODENOSCOPY N/A 7/2/2018    Procedure: ESOPHAGOGASTRODUODENOSCOPY (EGD);   Surgeon: Tosha Hernandez MD;  Location:  MAIN OR;  Service: Gastroenterology    FLAP LOCAL HEAD / NECK N/A 5/19/2016    Procedure: VERSUS FLAP ;  Surgeon: Laverne Oconnor MD;  Location:  MAIN OR;  Service:    Casandra RODRIGUEZ REPLACEMENT Left     hip    MITRAL VALVE REPLACEMENT      MOHS RECONSTRUCTION N/A 5/19/2016    Procedure: RECONSTRUCTION MOHS DEFECT NOSE AND SCALP;  Surgeon: Arnaldo Rodriguez MD;  Location:  MAIN OR;  Service:    [facility] SKIN BIOPSY       HPI: Terri Gold is a 80 y o  male who presented presented to the emergency department from [address] [place] [address] with a fall  As per patient he was going to the bathroom when he lost his balance and fell  He denies any loss of consciousness or head injury  He was sent to ER with right flank contusion but patient denies having any pain at this time  Patient has chronic respiratory failure and uses 2 L of oxygen at the facility  As per family patient has been declining over the last 3-4 weeks  Patient has been complaining of dyspnea on exertion, generalized weakness and tiredness  In ER patient was found to have a hemoglobin of 7 5 and creatinine of 2 81  Patient's last blood work was in February 2018 and he had a hemoglobin of 12 4 and creatinine 1 52  Upon further questioning patient did admit to having black tarry stools for the last few days  Denies having any bright red blood per rectum or hematemesis  Patient denies having any chest pain, back pain, neck pain, headache or any weakness in the extremities  Denies any fever or chills  Denies any abdominal pain, nausea and vomiting  XR Chest (7/6/18): Stable congestion with small effusions  Improved right basilar opacity, likely atelectasis  XR Chest (7/9/18): Pulmonary congestion with small effusions  Overall findings are similar to the prior study      Summary:  Pt presents w/ a functional oropharyngeal swallow ella by good bolus formation, manipulation, and transfer  Laryngeal elevation was adequate  PO intake was limited  Pt  demonstrated no s/s or other difficulties during the evaluation       Recommendations:  Diet: level 2 mechanical soft for now; possible diet upgrade once fluid restriction is lifted  Liquid: Thin  Pt  needs extra fluid to clear solid food, but is currently on a fluid restriction      Meds: whole w/ puree   Supervision: Intermittent   Positioning: Upright  Strategies: Pt to take PO/Meds only when fully alert and upright  Aspiration precautions  Reflux precautions    Therapy Prognosis: Good   Prognosis considerations: Family support   Frequency:F/u 1x as appropriate and able     Reason for consult:  R/o aspiration  Determine safest and least restrictive diet    Current diet: Puree w/ thin liquid  Pt 's son was concerned b/c it took the pt  a long time to chew a piece of ham yesterday (7/10) and revealed this concern w/ doctors  Son states that the pt  has finally started to get his appetite back  Premorbid diet: Level 3 advanced dysphagia diet     Previous VBS: -    O2 requirement: NC    Voice/Speech: WFL    Social: Presents from [address] [place] [address] commands: Pt  was able to follow commands                          Cognitive Status: Pt  was awake and alert, laying on his side in bed    Oral mech exam:  Upper and lower dentures   Full symmetry    Items administered:  Puree, soft solid, and thin liquids  Liquids were taken by straw/cup  Oral stage:  Lip closure: WFL  Mastication: Slightly prolonged, but functional  Bolus formation: WFL  Bolus control: WFL  Transfer: Prompt transfer  Oral residue: Mild oral residue noted   Pocketing: None noted     Pharyngeal stage:  Swallow promptness: Swallow was prompt   Laryngeal rise: Adequate laryngeal rise   Wet voice: -  Throat clear: -  Cough: -  Secondary swallows: -  Audible swallows: -  No s/s aspiration    Esophageal stage:  H/o GERD  H/o hiatal hernia  H/o EGD    Results d/w:  Pt, nursing, family    Goal(s):  Pt will tolerate least restrictive diet w/out s/s aspiration or oral/pharyngeal difficulties

## 2018-07-11 NOTE — PROGRESS NOTES
Elke Vigil  [id number]    40 y o   male      ASSESSMENT  1   Symptomatic anemia with melena   EGD 7/2 showed erosive gastritis and a small hiatal hernia  + intestinal metaplasia of stomach  H P negative   No overt bleeding site identified  August Rodriguez remains stable today  2   Acute anemia probably acute blood loss anemia  3   GERD on Pepcid  4   Distant history of iron deficiency anemia and occult GI bleeding   S/P EGD/colonoscopy/capsule endoscopy 2007   Treated with a year course of oral iron  5   Distant history of peptic ulcer disease  6   Distant history of colon cancer  7   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  with congestive heart failure followed by Dr Sylvain Peña  8   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  9   H/O AAA  10   DM  11  History of abdominal distention and tenderness due to diffuse bladder distension - relieved following catheterization       PLAN  1   Follow H&H and transfuse as needed, and cont PPI  2   Watch for overt GI blood loss  3   Miralax BID, add dulcolax if no BM  4   Would hold off on colonoscopy as I do not think he would tolerate the procedure; I believe the risks would outweigh the benefits unless overt bleeding noted  5  Speech and Swallow evaluation    Chief Complaint   Patient presents with    Fall     Patient sent in from [place] [address] after falling out of his wheelchair today  Staff states this is the 3rd time he has done this in 2 days  Patietn initially complaisned of back pain but none upon arrival       SUBJECTIVE/HPI  Without sig events overnight  No documented BM in past 24hrs  /58   Pulse 81   Temp 98 5 °F (36 9 °C) (Tympanic)   Resp 20   Ht 5' 6" (1 676 m)   Wt 77 5 kg (170 lb 13 7 oz)   SpO2 99%   BMI 27 58 kg/m²       PHYSICALEXAM  Constitutional:  Elderly, frail, weak and lethargic  Does not appear to be in distress    Eyes:  conjunctiva pale   HENT:  Atraumatic  Respiratory:  No respiratory distress  Cardiovascular:  Normal rate   GI:  Soft, nondistended, normal bowel sounds, nontender  Neurologic:  Alert & oriented x 2  Psychiatric:  Speech and behavior appropriate       Lab Results   Component Value Date    GLUCOSE 67 07/11/2018    CALCIUM 8 0 (L) 07/11/2018     (H) 07/11/2018    K 3 5 07/11/2018    CO2 36 (H) 07/11/2018     07/11/2018    BUN 70 (H) 07/11/2018    CREATININE 1 96 (H) 07/11/2018     Lab Results   Component Value Date    WBC 10 87 (H) 07/11/2018    HGB 7 9 (L) 07/11/2018    HCT 27 1 (L) 07/11/2018    MCV 93 07/11/2018     07/11/2018     Lab Results   Component Value Date    ALT 23 07/06/2018    AST 25 07/06/2018    ALKPHOS 63 07/06/2018    BILITOT 0 40 07/06/2018     No results found for: AMYLASE  No results found for: LIPASE  Lab Results   Component Value Date    IRON 18 (L) 07/02/2018    TIBC 423 07/02/2018    FERRITIN 47 07/02/2018     Lab Results   Component Value Date    INR 1 17 07/01/2018       Counseling / Coordination of Care  Total floor / unit time spent today 25 minutes  Greater than 50% of total time was spent with the patient and / or family counseling and / or coordination of care   A description of the counseling / coordination of care: Jaycob Curry MD

## 2018-07-11 NOTE — PLAN OF CARE
Problem: SLP ADULT - SWALLOWING, IMPAIRED  Goal: Initial SLP swallow eval performed  Outcome: Completed Date Met: 07/11/18

## 2018-07-11 NOTE — PROGRESS NOTES
Progress Note - Jona Gordon 80 y o  male MRN: [id number]  Unit/Bed#: [address] [place] [address] [id number] Encounter: [id number]    Assessment:  Principal Problem:    Acute erosive gastritis  Active Problems:    Acute kidney injury ([address] )    Chronic kidney disease, stage 3    Type 2 diabetes mellitus with stage 3 chronic kidney disease, without long-term current use of insulin (HCC)    Acute on chronic systolic congestive heart failure (HCC)    Acute blood loss anemia    Chronic respiratory failure with hypoxia (HCC)    Hypertension    COPD (chronic obstructive pulmonary disease) (HCC)    Contusion of flank    Hyperlipidemia    Cancer (HCC)    ATN (acute tubular necrosis) (HCC)    Parenchymal renal hypertension    Benign prostatic hyperplasia with urinary retention    Acute pain of left shoulder    Constipation  Resolved Problems:    * No resolved hospital problems  *      Plan:  · GI bleed with acute blood loss anemia due to acute gastritis  H&H is stable  GI follow-up noted  Continue on Protonix  · Acute kidney injury in chronic kidney disease stage 3/4  Patient baseline creatinine is 1 5  Creatinine is down to 1 96  Continue to hold Lasix  Nephrology follow-up  Avoid hypotension/nephrotoxins medication  · Acute on chronic systolic heart failure and hypertension  Daily weight and I&Os  Will hold Lasix secondary to worsening kidney function  Continue on statin, Toprol-XL  · Diabetes mellitus  Continue on Lantus insulin  Accu-Chek a c  HS with Humalog sliding scale  · BPH-on Flomax  CT abdomen pelvis showed mild hydro  urology consult appreciated  Started on finasteride  · DVT prophylaxis  · Discussed with patient and his son at length  Subjective:    Patient is seen and examined at bedside  Denies any new complaints  Afebrile  All other ROS are negative  Objective:   Vitals: Blood pressure 113/58, pulse 81, temperature 98 5 °F (36 9 °C), temperature source Tympanic, resp   rate 20, height 5' 6" (1 676 m), weight 77 5 kg (170 lb 13 7 oz), SpO2 99 %  ,Body mass index is 27 58 kg/m²  SPO2 RA Rest      ED to Hosp-Admission (Current) from 7/1/2018 in [facility]   SpO2  99 %   SpO2 Activity  At Rest   O2 Device  Nasal cannula   O2 Flow Rate  2 L/min        I&O:   Intake/Output Summary (Last 24 hours) at 07/11/18 1412  Last data filed at 07/11/18 0553   Gross per 24 hour   Intake                0 ml   Output             1275 ml   Net            -1275 ml       Physical Exam:    General- Alert, lying comfortably in bed  Not in any acute distress  HEENT- ARNOLDO, EOM intact  Neck- Supple, No JVD  CVS- regular, S1 and S2 normal   Chest- Bilateral Air entry, No rhochi, crackles or wheezing present  Abdomen- soft, nontender, not distended, no guarding or rigidity, BS+  Extremities-  No pedal edema, No calf tenderness  CNS-   Alert, awake and orientedx3  No focal deficits present      Invasive Devices     Peripheral Intravenous Line            Peripheral IV 07/09/18 Left Arm 2 days          Drain            External Urinary Catheter Large 3 days    Urethral Catheter 16 Fr  1 day                      Social History  reviewed  Family History   Problem Relation Age of Onset    COPD Father     reviewed    Meds:  Current Facility-Administered Medications   Medication Dose Route Frequency Provider Last Rate Last Dose    acetaminophen (TYLENOL) tablet 650 mg  650 mg Oral Q6H PRN Neha Huang MD   650 mg at 07/01/18 2016    albuterol inhalation solution 2 5 mg  2 5 mg Nebulization Q6H PRN Neha Huang MD   2 5 mg at 07/01/18 1954    budesonide (PULMICORT) inhalation solution 0 5 mg  0 5 mg Nebulization BID Neha Huang MD   0 5 mg at 07/11/18 0846    ferrous sulfate tablet 325 mg  325 mg Oral Daily With Breakfast Mary Kate Fly, DO   325 mg at 07/11/18 0817    finasteride (PROSCAR) tablet 5 mg  5 mg Oral Daily Marilee Marquez PA-C   5 mg at 07/11/18 0817    HYDROcodone-acetaminophen (NORCO) 5-325 mg per tablet 1 tablet  1 tablet Oral Q6H PRN Gage Del Rio MD   1 tablet at 07/09/18 1603    insulin glargine (LANTUS) subcutaneous injection 18 Units 0 18 mL  18 Units Subcutaneous HS Anila Jett MD   18 Units at 07/10/18 2137    insulin lispro (HumaLOG) 100 units/mL subcutaneous injection 1-5 Units  1-5 Units Subcutaneous HS ROMAINE Dillon   2 Units at 07/06/18 2114    insulin lispro (HumaLOG) 100 units/mL subcutaneous injection 1-6 Units  1-6 Units Subcutaneous TID AC Gage Del Rio MD   1 Units at 07/09/18 1804    insulin lispro (HumaLOG) 100 units/mL subcutaneous injection 5 Units  5 Units Subcutaneous TID With Meals Anila Jett MD   5 Units at 07/11/18 1212    iohexol (OMNIPAQUE) 240 MG/ML solution 50 mL  50 mL Oral Once in imaging Gage Del Rio MD        ipratropium-albuterol (DUO-NEB) 0 5-2 5 mg/3 mL inhalation solution 3 mL  3 mL Nebulization TID Carisa Cisneros DO   3 mL at 07/11/18 0846    latanoprost (XALATAN) 0 005 % ophthalmic solution 1 drop  1 drop Both Eyes HS Gage Del Rio MD   1 drop at 07/10/18 2137    levofloxacin (LEVAQUIN) tablet 250 mg  250 mg Oral Q24H Mary Kate Fly, DO   250 mg at 07/10/18 1556    menthol-methyl salicylate (BENGAY) 47-67 % cream   Apply externally 4x Daily PRN Haley Cough, CRNP   1 application at 83/43/88 2008    metoprolol succinate (TOPROL-XL) 24 hr tablet 12 5 mg  12 5 mg Oral Daily Mary Kate Fly, DO   12 5 mg at 07/11/18 0817    multivitamin stress formula tablet 1 tablet  1 tablet Oral Daily Mary Kate Fly, DO   1 tablet at 07/11/18 0816    ondansetron (ZOFRAN) injection 4 mg  4 mg Intravenous Q6H PRN Gage Del Rio MD        pantoprazole (PROTONIX) EC tablet 40 mg  40 mg Oral Early Morning Doreen Reynolds MD   40 mg at 07/11/18 0540    polyethylene glycol (MIRALAX) packet 17 g  17 g Oral BID PRN Kofi Cabrera MD   17 g at 07/11/18 0818    pravastatin (PRAVACHOL) tablet 40 mg  40 mg Oral Daily With [facility] Dwight Domingo MD   40 mg at 07/10/18 1556    senna (SENOKOT) tablet 8 6 mg  1 tablet Oral HS Sabra Romero MD   8 6 mg at 07/10/18 2137    tamsulosin (FLOMAX) capsule 0 4 mg  0 4 mg Oral Daily With Mary Worthington MD   0 4 mg at 07/10/18 1556    traMADol (ULTRAM) tablet 50 mg  50 mg Oral Q6H PRN Mary Kate Fly, DO   50 mg at 18 1735      Prescriptions Prior to Admission   Medication    acetaminophen (TYLENOL) 650 mg suppository    albuterol (2 5 mg/3 mL) 0 083 % nebulizer solution    aspirin 81 MG tablet    budesonide (PULMICORT) 0 5 mg/2 mL nebulizer solution    docusate sodium (COLACE) 100 mg capsule    famotidine (PEPCID) 20 mg tablet    furosemide (LASIX) 40 mg tablet    glimepiride (AMARYL) 1 mg tablet    latanoprost (XALATAN) 0 005 % ophthalmic solution    metolazone (ZAROXOLYN) 5 mg tablet    metoprolol succinate (TOPROL-XL) 25 mg 24 hr tablet    polyethylene glycol (MIRALAX) 17 g packet    [] predniSONE 10 mg tablet    senna (SENOKOT) 8 6 MG tablet    simvastatin (ZOCOR) 20 mg tablet    sodium chloride (OCEAN) 0 65 % nasal spray    spironolactone (ALDACTONE) 25 mg tablet    umeclidinium-vilanterol (ANORO ELLIPTA) 62 5-25 MCG/INH inhaler    acetaminophen-codeine (TYLENOL #3) 300-30 mg per tablet    cephalexin (KEFLEX) 500 mg capsule       Labs:    Results from last 7 days  Lab Units 18  0517 07/10/18  0440 18  0509   WBC Thousand/uL 10 87* 10 71* 11 14*   HEMOGLOBIN g/dL 7 9* 8 1* 8 1*   HEMATOCRIT % 27 1* 27 5* 27 2*   PLATELETS Thousands/uL 227 232 237   NEUTROS PCT % 88* 85* 84*   LYMPHS PCT % 4* 5* 5*   MONOS PCT % 6 7 8   EOS PCT % 2 2 2       Results from last 7 days  Lab Units 18  0517 07/10/18  0440 18  0509  07/06/18  0205   SODIUM mmol/L 146* 144 142  < > 141   POTASSIUM mmol/L 3 5 3 7 3 8  < > 3 9   CHLORIDE mmol/L 106 103 101  < > 102   CO2 mmol/L 36* 35* 34*  < > 30   BUN mg/dL 70* 76* 76*  < > 90*   CREATININE mg/dL 1 96* 2 36* 2 50*  < > 2 25*   CALCIUM mg/dL 8 0* 8 1* 7 9*  < > 8 4   TOTAL PROTEIN g/dL  --   --   --   --  6 7   BILIRUBIN TOTAL mg/dL  --   --   --   --  0 40   ALK PHOS U/L  --   --   --   --  63   ALT U/L  --   --   --   --  23   AST U/L  --   --   --   --  25   GLUCOSE RANDOM mg/dL 67 76 137  < > 142*   < > = values in this interval not displayed  Lab Results   Component Value Date    TROPONINI 0 09 (H) 07/03/2018    TROPONINI 0 08 (H) 07/03/2018    TROPONINI 0 10 (H) 07/03/2018    CKTOTAL 56 07/01/2018         Lab Results   Component Value Date    SPUTUMCULTUR 3+ Growth of Pseudomonas aeruginosa (A) 07/05/2018    SPUTUMCULTUR 3+ Growth of  07/05/2018         Imaging:  Results for orders placed during the hospital encounter of 07/01/18   XR chest portable    Narrative CHEST     INDICATION:   Shortness of breath  Congestive failure       COMPARISON:  None    EXAM PERFORMED/VIEWS:  XR CHEST PORTABLE      FINDINGS:  There are median sternotomy wires indicating prior cardiac surgery  Dual-lead left chest pacemaker is noted prosthetic cardiac valve is seen  Heart is enlarged  Atherosclerotic vascular calcifications are noted  Mild pulmonary vascular congestive changes  Left lung base is poorly evaluated there appears to be a left pleural effusion  Trace costophrenic angle effusion on the right  Relative increased parenchymal density in the left perihilar region of the right lower lobe raises the suspicion of either   atelectasis or pneumonia  Osseous structures appear within normal limits for patient age  Impression Cardiomegaly  Mild pulmonary vascular congestive changes  Left greater than right pleural effusions  Relative increased parenchymal density in the left perihilar region and right lower lobe could represent atelectasis or small patchy areas of   pneumonia          Workstation performed: [id number]       Results for orders placed during the hospital encounter of 07/01/18   XR chest pa & lateral Narrative CHEST     INDICATION:   CHF  Shortness of breath    COMPARISON:  7/6/2018    EXAM PERFORMED/VIEWS:  XR CHEST PA & LATERAL      FINDINGS:  There are median sternotomy wires indicating prior cardiac surgery  Heart shadow is enlarged but unchanged from prior exam   Transvenous pacemaker leads are unchanged in position  There is a prosthetic heart valve  There are small bilateral effusions left greater than right  There is pulmonary congestion  Findings are similar to the prior    Osseous structures appear within normal limits for patient age  Impression Pulmonary congestion with small effusions  Overall findings are similar to the prior study        Workstation performed: [id number] [facility] [address]: I have personally reviewed pertinent reports        VTE Pharmacologic Prophylaxis: Reason for no pharmacologic prophylaxis GI bleed  VTE Mechanical Prophylaxis: sequential compression device    Code Status:   Level 1 - Full Code      "This note has been constructed using a voice recognition system"      Esther Cruz MD  7/11/2018,2:12 PM

## 2018-07-11 NOTE — PLAN OF CARE
Problem: Potential for Falls  Goal: Patient will remain free of falls  INTERVENTIONS:  - Assess patient frequently for physical needs  -  Identify cognitive and physical deficits and behaviors that affect risk of falls    -  [place] fall precautions as indicated by assessment   - Educate patient/family on patient safety including physical limitations  - Instruct patient to call for assistance with activity based on assessment  - Modify environment to reduce risk of injury  - Consider OT/PT consult to assist with strengthening/mobility   Outcome: Progressing      Problem: Prexisting or High Potential for Compromised Skin Integrity  Goal: Skin integrity is maintained or improved  INTERVENTIONS:  - Identify patients at risk for skin breakdown  - Assess and monitor skin integrity  - Assess and monitor nutrition and hydration status  - Monitor labs (i e  albumin)  - Assess for incontinence   - Turn and reposition patient  - Assist with mobility/ambulation  - Relieve pressure over bony prominences  - Avoid friction and shearing  - Provide appropriate hygiene as needed including keeping skin clean and dry  - Evaluate need for skin moisturizer/barrier cream  - Collaborate with interdisciplinary team (i e  Nutrition, Rehabilitation, etc )   - Patient/family teaching   Outcome: Progressing      Problem: PAIN - ADULT  Goal: Verbalizes/displays adequate comfort level or baseline comfort level  Interventions:  - Encourage patient to monitor pain and request assistance  - Assess pain using appropriate pain scale, rate scale 0-10  - Administer analgesics based on type and severity of pain and evaluate response  - Implement non-pharmacological measures as appropriate and evaluate response  - Consider cultural and social influences on pain and pain management  - Notify physician/advanced practitioner if interventions unsuccessful or patient reports new pain   Outcome: Progressing      Problem: INFECTION - ADULT  Goal: Absence or prevention of progression during hospitalization  INTERVENTIONS:  - Assess and monitor for signs and symptoms of infection  - Monitor lab/diagnostic results  - Monitor all insertion sites, i e  indwelling lines, tubes, and drains  - Monitor endotracheal (as able) and nasal secretions for changes in amount and color  - [place] appropriate cooling/warming therapies per order  - Administer medications as ordered  - Instruct and encourage patient and family to use good hand hygiene technique  - Identify and instruct in appropriate isolation precautions for identified infection/condition   Outcome: Progressing      Problem: DISCHARGE PLANNING  Goal: Discharge to home or other facility with appropriate resources  INTERVENTIONS:  - Identify barriers to discharge w/patient and caregiver  - Arrange for needed discharge resources and transportation as appropriate  - Identify discharge learning needs (meds, wound care, etc )  - Arrange for interpretive services to assist at discharge as needed  - Refer to Case Management Department for coordinating discharge planning if the patient needs post-hospital services based on physician/advanced practitioner order or complex needs related to functional status, cognitive ability, or social support system   Outcome: Progressing      Problem: Knowledge Deficit  Goal: Patient/family/caregiver demonstrates understanding of disease process, treatment plan, medications, and discharge instructions  Complete learning assessment and assess knowledge base    Interventions:  - Provide teaching at level of understanding  - Provide teaching via preferred learning methods   Outcome: Progressing      Problem: CARDIOVASCULAR - ADULT  Goal: Absence of cardiac dysrhythmias or at baseline rhythm  INTERVENTIONS:  - Continuous cardiac monitoring, monitor vital signs, obtain 12 lead EKG if indicated  - Administer antiarrhythmic and heart rate control medications as ordered  - Monitor electrolytes and administer replacement therapy as ordered   Outcome: Progressing      Problem: RESPIRATORY - ADULT  Goal: Achieves optimal ventilation and oxygenation  INTERVENTIONS:  - Assess for changes in respiratory status  - Assess for changes in mentation and behavior  - Position to facilitate oxygenation and minimize respiratory effort  - Oxygen administration by appropriate delivery method based on oxygen saturation, via nasal canula  - Encourage broncho-pulmonary hygiene including cough, deep breathe, Incentive Spirometry  - Assess the need for suctioning and aspirate as needed  - Assess and instruct to report SOB or any respiratory difficulty  - Respiratory Therapy support as indicated   Outcome: Progressing      Problem: METABOLIC, FLUID AND ELECTROLYTES - ADULT  Goal: Electrolytes maintained within normal limits  INTERVENTIONS:  - Monitor labs and assess patient for signs and symptoms of electrolyte imbalances  - Administer electrolyte replacement as ordered  - Monitor response to electrolyte replacements, including repeat lab results as appropriate  - Instruct patient on fluid and nutrition as appropriate   Outcome: Progressing    Goal: Fluid balance maintained  INTERVENTIONS:  - Monitor labs and assess for signs and symptoms of volume excess or deficit  - Monitor I/O and WT  - Instruct patient on fluid and nutrition as appropriate   Outcome: Progressing    Goal: Glucose maintained within target range  INTERVENTIONS:  - Monitor Blood Glucose as ordered  - Assess for signs and symptoms of hyperglycemia and hypoglycemia  - Administer ordered medications to maintain glucose within target range  - Assess nutritional intake and initiate nutrition service referral as needed   Outcome: Progressing      Problem: SKIN/TISSUE INTEGRITY - ADULT  Goal: Incision(s), wounds(s) or drain site(s) healing without S/S of infection  INTERVENTIONS  - Assess and document risk factors for skin impairment   - Assess and document dressing, incision, wound bed, drain sites and surrounding tissue  - Initiate Nutrition services consult and/or wound management as needed   Outcome: Progressing      Problem: HEMATOLOGIC - ADULT  Goal: Maintains hematologic stability  INTERVENTIONS  - Assess for signs and symptoms of bleeding or hemorrhage  - Monitor labs  - Administer supportive blood products/factors as ordered and appropriate   Outcome: Progressing

## 2018-07-11 NOTE — PLAN OF CARE
Problem: PHYSICAL THERAPY ADULT  Goal: Performs mobility at highest level of function for planned discharge setting  See evaluation for individualized goals  Treatment/Interventions: Functional transfer training, LE strengthening/ROM, Therapeutic exercise, Endurance training, Bed mobility, Gait training, Spoke to nursing, OT  Equipment Recommended: Lisa Regalado, PT         See flowsheet documentation for full assessment, interventions and recommendations  Outcome: Progressing  Prognosis: Fair  Problem List: Decreased strength, Decreased endurance, Impaired balance, Decreased mobility, Decreased cognition  Assessment: Pt demonstrated min overall improvement in functional mobility and tolerance to activities this session; mod (A)x 1-2 was required for OOB to chair transition w/ rw; rest periods provided as needed t/o the session; pt remained in NAD and appeared to be comfortable at the end of session; currently, cont to recommend rehab upon D/C when medically cleared; will follow  Recommendation: Post acute IP rehab          See flowsheet documentation for full assessment

## 2018-07-11 NOTE — OCCUPATIONAL THERAPY NOTE
Occupational Therapy Treatment Note      Jeaneth Juliano    7/11/2018    Patient Active Problem List   Diagnosis    Acute kidney injury ([facility] )    Chronic kidney disease, stage 3    Type 2 diabetes mellitus with stage 3 chronic kidney disease, without long-term current use of insulin (HCC)    Acute on chronic systolic congestive heart failure ([facility] )    Acute blood loss anemia    Chronic respiratory failure with hypoxia ([place] [id number] )    Hypertension    COPD (chronic obstructive pulmonary disease) ([place] [address] )    Contusion of flank    Hyperlipidemia    Cancer ([place] [address] )    ATN (acute tubular necrosis) (HCC)    Parenchymal renal hypertension    Benign prostatic hyperplasia with urinary retention    Acute pain of left shoulder    Acute erosive gastritis    Constipation       Past Medical History:   Diagnosis Date    Aortic regurgitation     Atrial fibrillation (HCC)     Cancer (HCC)     colon    CHF (congestive heart failure) (HCC)     Complete heart block (HCC)     post CABG    COPD (chronic obstructive pulmonary disease) ([place] [address] )     Coronary artery disease     MI    Diabetes mellitus ([place] [address] )     Dissecting aortic aneurysm (HCC)     Endocarditis     Hx of bleeding disorder     GI bleed on aspirin    Hypertension     Mitral regurgitation     Skin cancer        Past Surgical History:   Procedure Laterality Date    AORTIC VALVE REPLACEMENT      CARDIAC PACEMAKER PLACEMENT      [employer]     CARDIAC SURGERY      CABG on physician notes, none per pt   CHOLECYSTECTOMY      COLECTOMY      ESOPHAGOGASTRODUODENOSCOPY N/A 7/2/2018    Procedure: ESOPHAGOGASTRODUODENOSCOPY (EGD);   Surgeon: Doroteo Bryan MD;  [facility];  Service: Gastroenterology    FLAP LOCAL HEAD / NECK N/A 5/19/2016    Procedure: VERSUS FLAP ;  Surgeon: Son Cheek MD;  [facility];  Service:     JOINT REPLACEMENT Left     hip    MITRAL VALVE REPLACEMENT      MOHS RECONSTRUCTION N/A 5/19/2016    Procedure: RECONSTRUCTION MOHS DEFECT NOSE AND SCALP;  Surgeon: Jolly Miller MD;  [facility];  Service:     SKIN BIOPSY          07/10/18 1000   Restrictions/Precautions   Other Precautions Cognitive; Fall Risk   Pain Assessment   Pain Assessment FLACC   Pain Location Abdomen   Pain Descriptors Patient unable to describe   Pain Frequency Intermittent   Effect of Pain on Daily Activities limits participation with ADLs    Pain Rating: FLACC (Rest) - Face 1   Pain Rating: FLACC (Rest) - Legs 1   Pain Rating: FLACC (Rest) - Activity 1   Pain Rating: FLACC (Rest) - Cry 1   Pain Rating: FLACC (Rest) - Consolability 1   Score: FLACC (Rest) 5   Pain Rating: FLACC (Activity) - Face 1   Pain Rating: FLACC (Activity) - Legs 1   Pain Rating: FLACC (Activity) - Activity 1   Pain Rating: FLACC (Activity) - Cry 1   Pain Rating: FLACC (Activity) - Consolability 1   Score: FLACC (Activity) 5   ADL   Grooming Assistance 2  Maximal Assistance   UB Bathing Assistance 1  Total Assistance   UB Bathing Deficit (required total A with self-care, unable to initate with hand)   UB Dressing Assistance 2  Maximal Assistance   UB Dressing Deficit Thread RUE; Thread LUE; Increased time to complete  (hand over hand to initiate task)   Bed Mobility   Rolling R 2  Maximal assistance   Additional items Assist x 2   Rolling L 2  Maximal assistance   Additional items Assist x 2   Cognition   Overall Cognitive Status Impaired   Arousal/Participation Lethargic   Attention Difficulty attending to directions   Orientation Level Oriented to person   Memory Decreased recall of biographical information   Following Commands Follows one step commands inconsistently   Activity Tolerance   Activity Tolerance Patient limited by fatigue;Patient limited by pain  (Activity Tolerance: Poor )   Assessment   Assessment Patient seen for OT session focsuing on bedlevel ADLs and bed mobility training    Patient minimally participatory with all activity, OT attempted hand over hand a with basic bathing however patient unable to physically or cognitively participate effectively with task  Patient continues to require heavy a x 2 for bed mobility with rolling and re-positioning, fatigues easily and grunting at times requires 2-3 min rest between each tasks to catch breath  From OT standpoint, recommend discharge to [facility] when medically stable       Princess Lopez, OT

## 2018-07-11 NOTE — PLAN OF CARE
Problem: OCCUPATIONAL THERAPY ADULT  Goal: Performs self-care activities at highest level of function for planned discharge setting  See evaluation for individualized goals  Treatment Interventions: ADL retraining, UE strengthening/ROM          See flowsheet documentation for full assessment, interventions and recommendations  Outcome: Not Progressing  Limitation: Decreased ADL status, Decreased UE strength, Decreased cognition, Decreased high-level ADLs, Decreased endurance  Prognosis: Fair  Assessment: Patient seen for OT session focsuing on bedlevel ADLs and bed mobility training  Patient minimally participatory with all activity, OT attempted hand over hand a with basic bathing however patient unable to physically or cognitively participate effectively with task  Patient continues to require heavy a x 2 for bed mobility with rolling and re-positioning, fatigues easily and grunting at times requires 2-3 min rest between each tasks to catch breath  From OT standpoint, recommend discharge to [facility] when medically stable         OT Discharge Recommendation: Short Term Rehab

## 2018-07-11 NOTE — PROGRESS NOTES
NEPHROLOGY PROGRESS NOTE    Naresh Benson 80 y o  male MRN: [id number]  Unit/Bed#: [address] [place] [address]  Encounter: [id number]  Reason for Consult:  Acute on chronic kidney disease    ASSESSMENT/PLAN:  1  Renal  Patient acute on chronic kidney disease baseline creatinine reportedly is 1 5  Creatinine had improved  Patient was detected to have urinary retention yesterday Moralez catheter was put replaced and creatinine stable around 1 9 after improvement from days prior  At this point Urology is following has catheter and was given medications to help try to improve bladder outlet dysfunction  Diuretics have been on hold  At this point will likely resume in the next day or so maintenance dose  Follow for addition of maintenance diuretic    2  Anemia  Patient with melena GI bleed  Hemoglobin seems to be stable GI is following  SUBJECTIVE:  Review of Systems   Constitution: Negative for chills and fever  HENT: Negative  Eyes: Negative  Cardiovascular: Negative for chest pain and orthopnea  Respiratory: Negative for cough and shortness of breath  Gastrointestinal: Negative for bloating, abdominal pain, diarrhea and vomiting  Genitourinary: Moralez catheter       OBJECTIVE:  Current Weight: Weight - Scale: 77 5 kg (170 lb 13 7 oz)  Vitals:Temp (24hrs), Av 4 °F (36 9 °C), Min:98 3 °F (36 8 °C), Max:98 5 °F (36 9 °C)  Current: Temperature: 98 5 °F (36 9 °C)   Blood pressure 113/58, pulse 81, temperature 98 5 °F (36 9 °C), temperature source Tympanic, resp  rate 20, height 5' 6" (1 676 m), weight 77 5 kg (170 lb 13 7 oz), SpO2 99 %  , Body mass index is 27 58 kg/m²        Intake/Output Summary (Last 24 hours) at 18 1428  Last data filed at 18 0553   Gross per 24 hour   Intake                0 ml   Output             1275 ml   Net            -1275 ml       Physical Exam: /58   Pulse 81   Temp 98 5 °F (36 9 °C) (Tympanic)   Resp 20   Ht 5' 6" (1 676 m)   Wt 77 5 kg (170 lb 13 7 oz)   SpO2 99%   BMI 27 58 kg/m²   Physical Exam   Constitutional: No distress  HENT:   Mouth/Throat: No oropharyngeal exudate  Eyes: No scleral icterus  Neck: Neck supple  No JVD present  Cardiovascular: Normal rate  Exam reveals no friction rub  Pulmonary/Chest: Effort normal and breath sounds normal  No respiratory distress  He has no wheezes  He has no rales  Abdominal: Soft  Bowel sounds are normal  He exhibits no distension  There is no tenderness  There is no rebound         Medications:    Current Facility-Administered Medications:     acetaminophen (TYLENOL) tablet 650 mg, 650 mg, Oral, Q6H PRN, Ramy Morfin MD, 650 mg at 07/01/18 2016    albuterol inhalation solution 2 5 mg, 2 5 mg, Nebulization, Q6H PRN, Ramy Morfin MD, 2 5 mg at 07/01/18 1954    budesonide (PULMICORT) inhalation solution 0 5 mg, 0 5 mg, Nebulization, BID, Ramy Morfin MD, 0 5 mg at 07/11/18 0846    ferrous sulfate tablet 325 mg, 325 mg, Oral, Daily With Breakfast, Mary Kate Joseph DO, 325 mg at 07/11/18 0817    finasteride (PROSCAR) tablet 5 mg, 5 mg, Oral, Daily, Marilee Marquez PA-C, 5 mg at 07/11/18 0817    HYDROcodone-acetaminophen (NORCO) 5-325 mg per tablet 1 tablet, 1 tablet, Oral, Q6H PRN, Ramy Morfin MD, 1 tablet at 07/09/18 1603    insulin glargine (LANTUS) subcutaneous injection 18 Units 0 18 mL, 18 Units, Subcutaneous, HS, Mirna Tanner MD, 18 Units at 07/10/18 2137    insulin lispro (HumaLOG) 100 units/mL subcutaneous injection 1-5 Units, 1-5 Units, Subcutaneous, HS, ROMAINE Dillon, 2 Units at 07/06/18 2114    insulin lispro (HumaLOG) 100 units/mL subcutaneous injection 1-6 Units, 1-6 Units, Subcutaneous, TID AC, 1 Units at 07/09/18 1804 **AND** Fingerstick Glucose (POCT), , , TID AC, Ramy Morfin MD    insulin lispro (HumaLOG) 100 units/mL subcutaneous injection 5 Units, 5 Units, Subcutaneous, TID With Meals, Mirna Tanner MD, 5 Units at 07/11/18 1215   iohexol (OMNIPAQUE) 240 MG/ML solution 50 mL, 50 mL, Oral, Once in imaging, Bambi Gonzales MD    ipratropium-albuterol (DUO-NEB) 0 5-2 5 mg/3 mL inhalation solution 3 mL, 3 mL, Nebulization, TID, Jose Proper, DO, 3 mL at 07/11/18 0846    latanoprost (XALATAN) 0 005 % ophthalmic solution 1 drop, 1 drop, Both Eyes, HS, Bambi Gonzales MD, 1 drop at 07/10/18 2137    levofloxacin (LEVAQUIN) tablet 250 mg, 250 mg, Oral, Q24H, Mary Kate Fly, DO, 250 mg at 07/10/18 1556    menthol-methyl salicylate (BENGAY) 60-81 % cream, , Apply externally, 4x Daily PRN, Hazel Blackwell, ROMAINE, 1 application at 05/45/09 2008    metoprolol succinate (TOPROL-XL) 24 hr tablet 12 5 mg, 12 5 mg, Oral, Daily, Mary Kate Fly, DO, 12 5 mg at 07/11/18 0017    multivitamin stress formula tablet 1 tablet, 1 tablet, Oral, Daily, Mary Kate Fly, DO, 1 tablet at 07/11/18 0816    ondansetron (ZOFRAN) injection 4 mg, 4 mg, Intravenous, Q6H PRN, Bambi Gonzales MD    pantoprazole (PROTONIX) EC tablet 40 mg, 40 mg, Oral, Early Morning, Doroteo Bryan MD, 40 mg at 07/11/18 0540    polyethylene glycol (MIRALAX) packet 17 g, 17 g, Oral, BID PRN, Mitzy Whalen MD, 17 g at 07/11/18 0818    pravastatin (PRAVACHOL) tablet 40 mg, 40 mg, Oral, Daily With Prashanth Valdez MD, 40 mg at 07/10/18 1556    senna (SENOKOT) tablet 8 6 mg, 1 tablet, Oral, HS, Patrick Gillis MD, 8 6 mg at 07/10/18 2137    tamsulosin (FLOMAX) capsule 0 4 mg, 0 4 mg, Oral, Daily With Prashanth Valdez MD, 0 4 mg at 07/10/18 1556    traMADol (ULTRAM) tablet 50 mg, 50 mg, Oral, Q6H PRN, Mary Kate Fly, DO, 50 mg at 07/03/18 5365    Laboratory Results:  Lab Results   Component Value Date    WBC 10 87 (H) 07/11/2018    HGB 7 9 (L) 07/11/2018    HCT 27 1 (L) 07/11/2018    MCV 93 07/11/2018     07/11/2018     Lab Results   Component Value Date    GLUCOSE 67 07/11/2018    CALCIUM 8 0 (L) 07/11/2018     (H) 07/11/2018    K 3 5 07/11/2018    CO2 36 (H) 07/11/2018    CL 106 07/11/2018    BUN 70 (H) 07/11/2018    CREATININE 1 96 (H) 07/11/2018     Lab Results   Component Value Date    CALCIUM 8 0 (L) 07/11/2018    PHOS 4 4 (H) 07/02/2018     No results found for: LABPROT

## 2018-07-12 LAB
ANION GAP SERPL CALCULATED.3IONS-SCNC: 3 MMOL/L (ref 4–13)
BASOPHILS # BLD AUTO: 0.02 THOUSANDS/ΜL (ref 0–0.1)
BASOPHILS NFR BLD AUTO: 0 % (ref 0–1)
BUN SERPL-MCNC: 61 MG/DL (ref 5–25)
CALCIUM SERPL-MCNC: 8 MG/DL (ref 8.3–10.1)
CHLORIDE SERPL-SCNC: 107 MMOL/L (ref 100–108)
CO2 SERPL-SCNC: 34 MMOL/L (ref 21–32)
CREAT SERPL-MCNC: 1.87 MG/DL (ref 0.6–1.3)
EOSINOPHIL # BLD AUTO: 0.22 THOUSAND/ΜL (ref 0–0.61)
EOSINOPHIL NFR BLD AUTO: 3 % (ref 0–6)
ERYTHROCYTE [DISTWIDTH] IN BLOOD BY AUTOMATED COUNT: 16.7 % (ref 11.6–15.1)
GFR SERPL CREATININE-BSD FRML MDRD: 31 ML/MIN/1.73SQ M
GLUCOSE SERPL-MCNC: 112 MG/DL (ref 65–140)
GLUCOSE SERPL-MCNC: 139 MG/DL (ref 65–140)
GLUCOSE SERPL-MCNC: 150 MG/DL (ref 65–140)
GLUCOSE SERPL-MCNC: 158 MG/DL (ref 65–140)
GLUCOSE SERPL-MCNC: 167 MG/DL (ref 65–140)
HCT VFR BLD AUTO: 26.5 % (ref 36.5–49.3)
HGB BLD-MCNC: 7.8 G/DL (ref 12–17)
IMM GRANULOCYTES # BLD AUTO: 0.04 THOUSAND/UL (ref 0–0.2)
IMM GRANULOCYTES NFR BLD AUTO: 1 % (ref 0–2)
LYMPHOCYTES # BLD AUTO: 0.46 THOUSANDS/ΜL (ref 0.6–4.47)
LYMPHOCYTES NFR BLD AUTO: 5 % (ref 14–44)
MCH RBC QN AUTO: 27.1 PG (ref 26.8–34.3)
MCHC RBC AUTO-ENTMCNC: 29.4 G/DL (ref 31.4–37.4)
MCV RBC AUTO: 92 FL (ref 82–98)
MONOCYTES # BLD AUTO: 0.64 THOUSAND/ΜL (ref 0.17–1.22)
MONOCYTES NFR BLD AUTO: 7 % (ref 4–12)
NEUTROPHILS # BLD AUTO: 7.26 THOUSANDS/ΜL (ref 1.85–7.62)
NEUTS SEG NFR BLD AUTO: 84 % (ref 43–75)
NRBC BLD AUTO-RTO: 0 /100 WBCS
PLATELET # BLD AUTO: 203 THOUSANDS/UL (ref 149–390)
PMV BLD AUTO: 10.1 FL (ref 8.9–12.7)
POTASSIUM SERPL-SCNC: 3.6 MMOL/L (ref 3.5–5.3)
RBC # BLD AUTO: 2.88 MILLION/UL (ref 3.88–5.62)
SODIUM SERPL-SCNC: 144 MMOL/L (ref 136–145)
WBC # BLD AUTO: 8.64 THOUSAND/UL (ref 4.31–10.16)

## 2018-07-12 PROCEDURE — 97110 THERAPEUTIC EXERCISES: CPT

## 2018-07-12 PROCEDURE — 99232 SBSQ HOSP IP/OBS MODERATE 35: CPT | Performed by: INTERNAL MEDICINE

## 2018-07-12 PROCEDURE — 97530 THERAPEUTIC ACTIVITIES: CPT

## 2018-07-12 PROCEDURE — 94760 N-INVAS EAR/PLS OXIMETRY 1: CPT

## 2018-07-12 PROCEDURE — 94640 AIRWAY INHALATION TREATMENT: CPT

## 2018-07-12 PROCEDURE — 85025 COMPLETE CBC W/AUTO DIFF WBC: CPT | Performed by: INTERNAL MEDICINE

## 2018-07-12 PROCEDURE — 82948 REAGENT STRIP/BLOOD GLUCOSE: CPT

## 2018-07-12 PROCEDURE — 80048 BASIC METABOLIC PNL TOTAL CA: CPT | Performed by: INTERNAL MEDICINE

## 2018-07-12 RX ORDER — POLYETHYLENE GLYCOL 3350 17 G/17G
17 POWDER, FOR SOLUTION ORAL 2 TIMES DAILY
Status: DISCONTINUED | OUTPATIENT
Start: 2018-07-12 | End: 2018-07-13 | Stop reason: HOSPADM

## 2018-07-12 RX ORDER — FUROSEMIDE 40 MG/1
40 TABLET ORAL
Status: DISCONTINUED | OUTPATIENT
Start: 2018-07-12 | End: 2018-07-13 | Stop reason: HOSPADM

## 2018-07-12 RX ADMIN — PRAVASTATIN SODIUM 40 MG: 40 TABLET ORAL at 17:04

## 2018-07-12 RX ADMIN — IPRATROPIUM BROMIDE AND ALBUTEROL SULFATE 3 ML: 2.5; .5 SOLUTION RESPIRATORY (INHALATION) at 08:45

## 2018-07-12 RX ADMIN — BUDESONIDE 0.5 MG: 0.5 INHALANT RESPIRATORY (INHALATION) at 20:11

## 2018-07-12 RX ADMIN — INSULIN GLARGINE 18 UNITS: 100 INJECTION, SOLUTION SUBCUTANEOUS at 22:25

## 2018-07-12 RX ADMIN — PANTOPRAZOLE SODIUM 40 MG: 40 TABLET, DELAYED RELEASE ORAL at 05:43

## 2018-07-12 RX ADMIN — INSULIN LISPRO 1 UNITS: 100 INJECTION, SOLUTION INTRAVENOUS; SUBCUTANEOUS at 11:44

## 2018-07-12 RX ADMIN — INSULIN LISPRO 1 UNITS: 100 INJECTION, SOLUTION INTRAVENOUS; SUBCUTANEOUS at 17:02

## 2018-07-12 RX ADMIN — BISACODYL 5 MG: 5 TABLET, COATED ORAL at 17:04

## 2018-07-12 RX ADMIN — LATANOPROST 1 DROP: 50 SOLUTION OPHTHALMIC at 22:26

## 2018-07-12 RX ADMIN — FERROUS SULFATE TAB 325 MG (65 MG ELEMENTAL FE) 325 MG: 325 (65 FE) TAB at 08:45

## 2018-07-12 RX ADMIN — POLYETHYLENE GLYCOL 3350 17 G: 17 POWDER, FOR SOLUTION ORAL at 17:04

## 2018-07-12 RX ADMIN — BUDESONIDE 0.5 MG: 0.5 INHALANT RESPIRATORY (INHALATION) at 08:45

## 2018-07-12 RX ADMIN — LEVOFLOXACIN 250 MG: 250 TABLET, FILM COATED ORAL at 17:04

## 2018-07-12 RX ADMIN — ZINC 1 TABLET: TAB ORAL at 08:47

## 2018-07-12 RX ADMIN — INSULIN LISPRO 1 UNITS: 100 INJECTION, SOLUTION INTRAVENOUS; SUBCUTANEOUS at 22:25

## 2018-07-12 RX ADMIN — TAMSULOSIN HYDROCHLORIDE 0.4 MG: 0.4 CAPSULE ORAL at 17:04

## 2018-07-12 RX ADMIN — FUROSEMIDE 40 MG: 40 TABLET ORAL at 17:04

## 2018-07-12 RX ADMIN — IPRATROPIUM BROMIDE AND ALBUTEROL SULFATE 3 ML: 2.5; .5 SOLUTION RESPIRATORY (INHALATION) at 14:24

## 2018-07-12 RX ADMIN — METOPROLOL SUCCINATE 12.5 MG: 25 TABLET, EXTENDED RELEASE ORAL at 08:44

## 2018-07-12 RX ADMIN — FINASTERIDE 5 MG: 5 TABLET, FILM COATED ORAL at 08:44

## 2018-07-12 RX ADMIN — IPRATROPIUM BROMIDE AND ALBUTEROL SULFATE 3 ML: 2.5; .5 SOLUTION RESPIRATORY (INHALATION) at 20:11

## 2018-07-12 NOTE — SPEECH THERAPY NOTE
Speech Language/Pathology    Speech/Language Pathology Progress Note    Patient Name: Jona Gordon  YTJENNIFER Date: 7/12/2018     Problem List  Patient Active Problem List   Diagnosis    [facility] kidney injury ([facility] )    Chronic kidney disease, stage 3    Type 2 diabetes mellitus with stage 3 chronic kidney disease, without long-term current use of insulin (HCC)    Acute on chronic systolic congestive heart failure (HCC)    Acute blood loss anemia    Chronic [facility] failure with hypoxia ([facility] )    Hypertension    COPD (chronic obstructive pulmonary disease) ([facility] )    Contusion of flank    Hyperlipidemia    Cancer ([facility] )    ATN (acute tubular necrosis) (HCC)    Parenchymal renal hypertension    Benign prostatic hyperplasia with urinary retention    Acute pain of left shoulder    Acute erosive gastritis    Constipation        Past Medical History  Past Medical History:   Diagnosis Date    Aortic regurgitation     Atrial fibrillation (HCC)     Cancer (HCC)     colon    CHF (congestive heart failure) (HCC)     Complete heart block (HCC)     post CABG    COPD (chronic obstructive pulmonary disease) ([facility] )     Coronary artery disease     MI    Diabetes mellitus ([facility] )     Dissecting aortic aneurysm ([facility] )     Endocarditis     Hx of bleeding disorder     GI bleed on aspirin    Hypertension     Mitral regurgitation     Skin cancer         Past Surgical History  Past Surgical History:   Procedure Laterality Date    AORTIC VALVE REPLACEMENT      CARDIAC PACEMAKER PLACEMENT      [employer]     CARDIAC SURGERY      CABG on physician notes, none per pt   CHOLECYSTECTOMY      COLECTOMY      ESOPHAGOGASTRODUODENOSCOPY N/A 7/2/2018    Procedure: ESOPHAGOGASTRODUODENOSCOPY (EGD);   Surgeon: Jose Adamson MD;  [facility];  Service: Gastroenterology    FLAP LOCAL HEAD / NECK N/A 5/19/2016    Procedure: VERSUS FLAP ;  Surgeon: Mehul Cesar MD;  [facility];  Service:  JOINT REPLACEMENT Left     hip    MITRAL VALVE REPLACEMENT      MOHS RECONSTRUCTION N/A 5/19/2016    Procedure: RECONSTRUCTION MOHS DEFECT NOSE AND SCALP;  Surgeon: Andres Reardon MD;  [facility];  Service:    Xander Roles SKIN BIOPSY           Subjective:  Pt  was dozing, sitting up in his chair  Pt  was easily aroused  Current Diet: Level 1 puree w/ thin  Recommendation of Level 2 mechanical soft diet with thin liquids was made after ST evaluation on 7/11/18  Objective:  Pt  trialed 1 slice of toast w/ butter and sips of thin liquid  Pt  had slightly prolonged but functional mastication c good bolus formation, manipulation, and transfer  Swallow was prompt and laryngeal rise was adequate  Pt  would benefit from a soft diet  Pt  is still on restricted fluids  Assessment:  Pt  tolerated entire slice of toast w/ butter and sips of thin  No s/s or other difficulties were noted  Plan/Recommendations:  1   Upgrade pt  to level 3 advanced dysphagia diet w/ thin liquids

## 2018-07-12 NOTE — PHYSICAL THERAPY NOTE
PHYSICAL THERAPY NOTE          Patient Name: Shaheed Weinberg  BMBCI'A Date: 7/12/2018 07/12/18 1025   Pain Assessment   Pain Assessment FLACC   Pain Type Chronic pain   Pain Location Buttocks   Pain Orientation Right   Pain Descriptors Aching;Discomfort   Pain Frequency Intermittent   Pain Onset Ongoing   Clinical Progression Gradually improving   Effect of Pain on Daily Activities unable to tolerate supine position for period of time   Patient's Stated Pain Goal No pain   Hospital Pain Intervention(s) Repositioned; Ambulation/increased activity; Distraction; Emotional support  (soft care cushion is placed on the chair; RN informed)   Response to Interventions reports being more comfortable in the chair   Pain Rating: FLACC (Rest) - Face 0   Pain Rating: FLACC (Rest) - Legs 1   Pain Rating: FLACC (Rest) - Activity 1   Pain Rating: FLACC (Rest) - Cry 1   Pain Rating: FLACC (Rest) - Consolability 0   Score: FLACC (Rest) 3   Pain Rating: FLACC (Activity) - Face 1   Pain Rating: FLACC (Activity) - Legs 0   Pain Rating: FLACC (Activity) - Activity 0   Pain Rating: FLACC (Activity) - Cry 1   Pain Rating: FLACC (Activity) - Consolability 1   Score: FLACC (Activity) 3   Restrictions/Precautions   Other Precautions O2;Fall Risk;Multiple lines;Hard of hearing  (chair alarm activated at the end of session)   General   Chart Reviewed Yes   Additional Pertinent History cleared for Tx session (spoke to nsg)   Response to Previous Treatment Patient with no complaints from previous session  Family/Caregiver Present No   Cognition   Overall Cognitive Status Impaired   Arousal/Participation Alert; Cooperative   Attention Attends with cues to redirect   Orientation Level Oriented to person;Oriented to situation   Memory Decreased recall of recent events   Following Commands Follows one step commands with increased time or repetition   Subjective Subjective Pt is observed in bed; agreeable to perform therex and mobilize   Bed Mobility   Rolling L 4  Minimal assistance   Additional items Assist x 1;Verbal cues   Supine to Sit 3  Moderate assistance   Additional items Assist x 2;HOB elevated; Increased time required;Verbal cues;LE management;Assist x 1   Transfers   Sit to Stand 3  Moderate assistance   Additional items Assist x 2; Increased time required;Verbal cues   Stand to Sit 3  Moderate assistance   Additional items Assist x 2;Verbal cues    Soft care cushion placed on the chair; RN informed    Stand pivot 3  Moderate assistance   Additional items Assist x 2; Increased time required;Verbal cues  (bed to chair to the (L) side)   Additional Comments Pillows placed for back and (L) trunk support; SCDs on; call bell w/in reach   Ambulation/Elevation   Gait pattern Not appropriate; Not tested  (decreased balance and endurance)   Assistive Device Rolling walker   Balance   Static Sitting Fair -   Static Standing Poor   Dynamic Standing Poor -   Activity Tolerance   Activity Tolerance Patient limited by fatigue;Patient limited by pain   Nurse Made Aware spoke to [address]   Exercises   Heelslides Supine;10 reps;AAROM; Bilateral  (hip/knee flex/ext; 2 sets)   Hip Abduction 10 reps;AAROM; Bilateral;Sitting  (2 sets)   Knee AROM Short Arc Quad Supine;10 reps;AAROM; Bilateral  (2 sets)   Knee AROM Long Arc Quad Sitting;10 reps;AAROM; Bilateral  (2 sets)   Ankle Pumps Supine;10 reps;AAROM; Bilateral  (2 sets)   Assessment   Prognosis Fair   Problem List Decreased strength;Decreased endurance; Impaired balance;Decreased mobility; Decreased cognition;Pain   Assessment Pt appears to exhibit more guarding w/ mobility today; ? related to buttock discomfort; LE therex performed in supine and sitting in an AAROM mode w/ rest periods provided as needed; (A)x2 was required for the most part w/ all aspects of limited mobility at bedside; pt remained in NAD and appeared to be more comfortable at the end of session; currently, cont to recommend rehab upon D/C when medically cleared; will follow  Goals   Patient Goals to change position   STG Expiration Date 07/16/18   Treatment Day 6   Plan   Treatment/Interventions Functional transfer training;LE strengthening/ROM; Therapeutic exercise; Endurance training;Cognitive reorientation; Bed mobility;Gait training;Spoke to nursing;OT   Progress Slow progress, decreased activity tolerance   PT Frequency Other (Comment)  (3-5x/wk)   Recommendation   Recommendation Post acute IP rehab   Equipment Recommended Sohail Valdes  (w/ (A))       Armida Fernandez, PT

## 2018-07-12 NOTE — SOCIAL WORK
Continue to follow  [facility] SNF has bed available for Pt on 7/13/18  Spoke with Pt's insurance([employer]: [phone] option 5), SNF approval for [facility] on 7/13/18, [id number] skilled level 1 update 7/19/18  Ambulance auth for [employer] [facility] is [id number]  Spoke Femi Redman at [employer] [facility] [employer] [facility], transportation arranged via BLS ambulance to [facility]  Julia Arnold is 9:30am on 7/13/18  Spoke with Pt's dtr(Jacquie) at bedside, informed of discharge on 7/13/18 and transport time of 9:30am  Pt's dtr(Jacquie) in agreement  Spoke with Sabrina Owens in admissions at [facility], informed of SNF auth and transport time for 7/13/18  Pt's nurse informed of transport time on 7/13/18  Will follow

## 2018-07-12 NOTE — PROGRESS NOTES
NEPHROLOGY PROGRESS NOTE    David Ulrich 80 y o  male MRN: [id number]  Unit/Bed#: [address] [place] [address]  Encounter: [id number]  Reason for Consult:  Acute on chronic kidney disease    ASSESSMENT/PLAN:  1  Renal   The patient acute renal failure that was multifactorial including changes in effective volume also urine retention  This point the obstruction issue has been relieved this Moralez catheter is present  Creatinine still slightly above baseline but is returning slowly  At this point the patient is stable from a renal standpoint for discharge as creatinine is near baseline  I will resume Lasix at 40 mg BID, which was maintenance dose  From a nephrology standpoint the spironolactone is not necessary but if this is felt necessary from a cardiac standpoint can be resumed at 25 mg a day  Metolazone should be used more on an as-needed basis if they feel the loop diuretic is not maintaining volume control  SUBJECTIVE:  Review of Systems   Constitution: Negative  HENT: Negative  Eyes: Negative  Cardiovascular: Negative  Gastrointestinal: Negative  Genitourinary: Moralez catheter in       OBJECTIVE:  Current Weight: Weight - Scale: 85 6 kg (188 lb 11 4 oz)  Vitals:Temp (24hrs), Av 3 °F (36 8 °C), Min:98 1 °F (36 7 °C), Max:98 6 °F (37 °C)  Current: Temperature: 98 1 °F (36 7 °C)   Blood pressure 126/60, pulse 81, temperature 98 1 °F (36 7 °C), temperature source Oral, resp  rate 18, height 5' 6" (1 676 m), weight 85 6 kg (188 lb 11 4 oz), SpO2 98 %  , Body mass index is 30 46 kg/m²        Intake/Output Summary (Last 24 hours) at 18 1246  Last data filed at 18 0547   Gross per 24 hour   Intake              220 ml   Output             1000 ml   Net             -780 ml       Physical Exam: /60 (BP Location: Right arm)   Pulse 81   Temp 98 1 °F (36 7 °C) (Oral)   Resp 18   Ht 5' 6" (1 676 m)   Wt 85 6 kg (188 lb 11 4 oz)   SpO2 98%   BMI 30 46 kg/m²   Physical Exam Constitutional: No distress  HENT:   Mouth/Throat: No oropharyngeal exudate  Eyes: No scleral icterus  Neck: No JVD present  Cardiovascular: Normal rate  Exam reveals no friction rub  Pulmonary/Chest: Effort normal  No respiratory distress  He has no wheezes  He has no rales  Abdominal: Soft  Bowel sounds are normal  He exhibits no distension  There is no tenderness  There is no rebound         Medications:    Current Facility-Administered Medications:     acetaminophen (TYLENOL) tablet 650 mg, 650 mg, Oral, Q6H PRN, Korina Bustillos MD, 650 mg at 07/01/18 2016    albuterol inhalation solution 2 5 mg, 2 5 mg, Nebulization, Q6H PRN, Korina Bustillos MD, 2 5 mg at 07/01/18 1954    budesonide (PULMICORT) inhalation solution 0 5 mg, 0 5 mg, Nebulization, BID, Korina Bustillos MD, 0 5 mg at 07/12/18 0845    ferrous sulfate tablet 325 mg, 325 mg, Oral, Daily With Breakfast, Mary Kate Joseph DO, 325 mg at 07/12/18 0845    finasteride (PROSCAR) tablet 5 mg, 5 mg, Oral, Daily, Marilee Marquez PA-C, 5 mg at 07/12/18 0844    HYDROcodone-acetaminophen (NORCO) 5-325 mg per tablet 1 tablet, 1 tablet, Oral, Q6H PRN, Korina Bustillos MD, 1 tablet at 07/09/18 1603    insulin glargine (LANTUS) subcutaneous injection 18 Units 0 18 mL, 18 Units, Subcutaneous, HS, Luis Antonio Mayes MD, 18 Units at 07/11/18 2129    insulin lispro (HumaLOG) 100 units/mL subcutaneous injection 1-5 Units, 1-5 Units, Subcutaneous, HS, ROMAINE Dillon, 2 Units at 07/06/18 2114    insulin lispro (HumaLOG) 100 units/mL subcutaneous injection 1-6 Units, 1-6 Units, Subcutaneous, TID AC, 1 Units at 07/12/18 1144 **AND** Fingerstick Glucose (POCT), , , TID AC, Korina Bustillos MD    insulin lispro (HumaLOG) 100 units/mL subcutaneous injection 5 Units, 5 Units, Subcutaneous, TID With Meals, Luis Antonio Mayes MD, 5 Units at 07/12/18 1145    iohexol (OMNIPAQUE) 240 MG/ML solution 50 mL, 50 mL, Oral, Once in imaging, Korina Bustillos, MD    ipratropium-albuterol (DUO-NEB) 0 5-2 5 mg/3 mL inhalation solution 3 mL, 3 mL, Nebulization, TID, Nestor Prey, DO, 3 mL at 18 0845    latanoprost (XALATAN) 0 005 % ophthalmic solution 1 drop, 1 drop, Both Eyes, HS, Ronnie Arredondo MD, 1 drop at 18    levofloxacin (LEVAQUIN) tablet 250 mg, 250 mg, Oral, Q24H, Mary Kate Fly, DO, 250 mg at 18    menthol-methyl salicylate (BENGAY) 31-74 % cream, , Apply externally, 4x Daily PRN, ROMAINE Damico, 1 application at 2008    metoprolol succinate (TOPROL-XL) 24 hr tablet 12 5 mg, 12 5 mg, Oral, Daily, Mary Kate Fly, DO, 12 5 mg at 18 0844    multivitamin stress formula tablet 1 tablet, 1 tablet, Oral, Daily, Mary Kate Fly, DO, 1 tablet at 18 0847    ondansetron (ZOFRAN) injection 4 mg, 4 mg, Intravenous, Q6H PRN, Ronnie Arredondo MD    pantoprazole (PROTONIX) EC tablet 40 mg, 40 mg, Oral, Early Morning, Araceli Pulido MD, 40 mg at 18 0543    polyethylene glycol (MIRALAX) packet 17 g, 17 g, Oral, BID PRN, Pat Reese MD, 17 g at 1818    pravastatin (PRAVACHOL) tablet 40 mg, 40 mg, Oral, Daily With Ruben John MD, 40 mg at 18    senna (SENOKOT) tablet 8 6 mg, 1 tablet, Oral, HS, Herminia Mueller MD, 8 6 mg at 18    tamsulosin (FLOMAX) capsule 0 4 mg, 0 4 mg, Oral, Daily With Ruben John MD, 0 4 mg at 18    traMADol (ULTRAM) tablet 50 mg, 50 mg, Oral, Q6H PRN, Mary Kate Fly, DO, 50 mg at 07/03/18 1735    Laboratory Results:  Lab Results   Component Value Date    WBC 8 64 2018    HGB 7 8 (L) 2018    HCT 26 5 (L) 2018    MCV 92 2018     2018     Lab Results   Component Value Date    GLUCOSE 112 2018    CALCIUM 8 0 (L) 2018     2018    K 3 6 2018    CO2 34 (H) 2018     2018    BUN 61 (H) 2018    CREATININE 1 87 (H) 2018     Lab Results   Component Value Date    CALCIUM 8 0 (L) 07/12/2018    PHOS 4 4 (H) 07/02/2018     No results found for: LABPROT

## 2018-07-12 NOTE — PROGRESS NOTES
[place] [facility]  [id number]    44 y o   male      ASSESSMENT  1   Symptomatic anemia with melena   EGD 7/2 showed erosive gastritis and a small hiatal hernia  + intestinal metaplasia of stomach  H P negative   No overt bleeding site identified  Yas Joy remains stable today at 7 8 g   2   Acute anemia probably acute blood loss anemia  3   GERD on Pepcid  4   Distant history of iron deficiency anemia and occult GI bleeding   S/P EGD/colonoscopy/capsule endoscopy 2007   Treated with a year course of oral iron  5   Distant history of peptic ulcer disease  6   Distant history of colon cancer  7   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  with congestive heart failure followed by Dr Lavonen Arce  8   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  9   H/O AAA  10   DM  11  History of abdominal distention and tenderness due to diffuse bladder distension - relieved following catheterization  12  Possible swallowing difficulty  Both Hayley from speech today and she would like to advance him to a level 3 with thin liquid diet, but as he is on a fluid restriction this has been challenging  She will revisit him today        PLAN  1  Speech to re-evaluate him today  2  Follow H&H and transfuse to maintain hemoglobin greater than 7 5 g  3  Continue PPI  4  Continue bowel protocol  5  Holding off on colonoscopy at the present time, as the risk likely outweigh the benefits unless overt GI blood loss evidenced, or hemoglobin significantly declined    Chief Complaint   Patient presents with   Marquita Nelson Fall     Patient sent in from [facility] after falling out of his wheelchair today  Staff states this is the 3rd time he has done this in 2 days  Patietn initially complaisned of back pain but none upon arrival       SUBJECTIVE/HPI   Patient's appetite suboptimal but seems to be tolerating small amounts of pureed diet with thin liquids      /60 (BP Location: Right arm)   Pulse 81   Temp 98 1 °F (36 7 °C) (Oral)   Resp 18   Ht 5' 6" (1 676 m) Wt 85 6 kg (188 lb 11 4 oz)   SpO2 98%   BMI 30 46 kg/m²       PHYSICALEXAM  Constitutional:  Lethargic, frail and weak  no acute distress, non-toxic appearance   Eyes:  conjunctiva normal   HENT:  Atraumatic  Respiratory:  No respiratory distress  Cardiovascular:  Normal rate   GI:  Soft, nondistended, normal bowel sounds, nontender  Neurologic:  Lethargic, but will respond to questions when asked      Lab Results   Component Value Date    GLUCOSE 112 07/12/2018    CALCIUM 8 0 (L) 07/12/2018     07/12/2018    K 3 6 07/12/2018    CO2 34 (H) 07/12/2018     07/12/2018    BUN 61 (H) 07/12/2018    CREATININE 1 87 (H) 07/12/2018     Lab Results   Component Value Date    WBC 8 64 07/12/2018    HGB 7 8 (L) 07/12/2018    HCT 26 5 (L) 07/12/2018    MCV 92 07/12/2018     07/12/2018     Lab Results   Component Value Date    ALT 23 07/06/2018    AST 25 07/06/2018    ALKPHOS 63 07/06/2018    BILITOT 0 40 07/06/2018     No results found for: AMYLASE  No results found for: LIPASE  Lab Results   Component Value Date    IRON 18 (L) 07/02/2018    TIBC 423 07/02/2018    FERRITIN 47 07/02/2018     Lab Results   Component Value Date    INR 1 17 07/01/2018       Counseling / Coordination of Care  Total floor / unit time spent today 25 minutes  Greater than 50% of total time was spent with the patient and / or family counseling and / or coordination of care  A description of the counseling / coordination of care: Erica Jackson

## 2018-07-12 NOTE — PROGRESS NOTES
Progress Note - Juan Antonio Franklin 80 y o  male MRN: [id number]  Unit/Bed#: [address] [place] [address] 204-02 Encounter: [id number]    Assessment:  Principal Problem:    Acute erosive gastritis  Active Problems:    Acute kidney injury ([address] )    Chronic kidney disease, stage 3    Type 2 diabetes mellitus with stage 3 chronic kidney disease, without long-term current use of insulin (HCC)    Acute on chronic systolic congestive heart failure (HCC)    Acute blood loss anemia    Chronic respiratory failure with hypoxia (HCC)    Hypertension    COPD (chronic obstructive pulmonary disease) (HCC)    Contusion of flank    Hyperlipidemia    Cancer (HCC)    ATN (acute tubular necrosis) (HCC)    Parenchymal renal hypertension    Benign prostatic hyperplasia with urinary retention    Acute pain of left shoulder    Constipation  Resolved Problems:    * No resolved hospital problems  *      Plan:  · GI bleed with acute blood loss anemia due to acute gastritis  H&H is stable  GI follow-up noted  Continue on Protonix  · Acute kidney injury in chronic kidney disease stage 3/4  Patient baseline creatinine is 1 5  Creatinine is down to 1 87     Nephrology follow-up noted  Okay to restart Lasix as per Nephrology  Avoid hypotension/nephrotoxins medications  · Acute on chronic systolic heart failure and hypertension  Daily weight and I&Os  Restarted on Lasix  Continue on statin, Toprol-XL  · Diabetes mellitus  Continue on Lantus insulin  Accu-Chek a c  HS with Humalog sliding scale  · BPH-on Flomax and finasteride  · DVT prophylaxis  · Chief renal function remained stable patient will be likely discharged to skilled nursing rehab tomorrow morning  · Discussed with patient and his son at length  Subjective:   Patient is seen and examined at bedside  Denies any new complaints  Feels better  Afebrile  All other ROS are negative  Objective:   Vitals: Blood pressure 126/60, pulse 80, temperature 98 1 °F (36 7 °C), temperature source Oral, resp  rate 18, height 5' 6" (1 676 m), weight 85 6 kg (188 lb 11 4 oz), SpO2 98 %  ,Body mass index is 30 46 kg/m²  SPO2 RA Rest      ED to Hosp-Admission (Current) from 7/1/2018 in [facility]   SpO2  98 %   SpO2 Activity  At Rest   O2 Device  Nasal cannula   O2 Flow Rate  2 L/min        I&O:   Intake/Output Summary (Last 24 hours) at 07/12/18 1444  Last data filed at 07/12/18 0547   Gross per 24 hour   Intake              220 ml   Output              650 ml   Net             -430 ml       Physical Exam:    General- Alert, sitting comfortably in chair  Not in any acute distress  HEENT- ARNOLDO, EOM intact  Neck- Supple, No JVD  CVS- regular, S1 and S2 normal   Chest- Bilateral Air entry, No rhochi, crackles or wheezing present  Abdomen- soft, nontender, not distended, no guarding or rigidity, BS+  Extremities-  No pedal edema, No calf tenderness  CNS-   Alert, awake and orientedx3  No focal deficits present      Invasive Devices     Peripheral Intravenous Line            Peripheral IV 07/09/18 Left Arm 3 days          Drain            External Urinary Catheter Large 4 days    Urethral Catheter 16 Fr  2 days                      Social History  reviewed  Family History   Problem Relation Age of Onset    COPD Father     reviewed    Meds:  Current Facility-Administered Medications   Medication Dose Route Frequency Provider Last Rate Last Dose    acetaminophen (TYLENOL) tablet 650 mg  650 mg Oral Q6H PRN Chloe Olvera MD   650 mg at 07/01/18 2016    albuterol inhalation solution 2 5 mg  2 5 mg Nebulization Q6H PRN Chloe Olvera MD   2 5 mg at 07/01/18 1954    budesonide (PULMICORT) inhalation solution 0 5 mg  0 5 mg Nebulization BID Chloe Olvera MD   0 5 mg at 07/12/18 0845    ferrous sulfate tablet 325 mg  325 mg Oral Daily With Breakfast Mary Kate Fly, DO   325 mg at 07/12/18 0845    finasteride (PROSCAR) tablet 5 mg  5 mg Oral Daily Marilee Marquez PA-C   5 mg at 07/12/18 0844    furosemide (LASIX) tablet 40 mg  40 mg Oral BID (diuretic) Gage Del Rio MD        HYDROcodone-acetaminophen (NORCO) 5-325 mg per tablet 1 tablet  1 tablet Oral Q6H PRN Gage Del Rio MD   1 tablet at 07/09/18 1603    insulin glargine (LANTUS) subcutaneous injection 18 Units 0 18 mL  18 Units Subcutaneous HS Anila Jett MD   18 Units at 07/11/18 2129    insulin lispro (HumaLOG) 100 units/mL subcutaneous injection 1-5 Units  1-5 Units Subcutaneous HS ROMAINE Dillon   2 Units at 07/06/18 2114    insulin lispro (HumaLOG) 100 units/mL subcutaneous injection 1-6 Units  1-6 Units Subcutaneous TID AC Gage Del Rio MD   1 Units at 07/12/18 1144    insulin lispro (HumaLOG) 100 units/mL subcutaneous injection 5 Units  5 Units Subcutaneous TID With Meals Anila Jett MD   5 Units at 07/12/18 1145    iohexol (OMNIPAQUE) 240 MG/ML solution 50 mL  50 mL Oral Once in imaging Gage Del Rio MD        ipratropium-albuterol (DUO-NEB) 0 5-2 5 mg/3 mL inhalation solution 3 mL  3 mL Nebulization TID Carisa Cisneros DO   3 mL at 07/12/18 1424    latanoprost (XALATAN) 0 005 % ophthalmic solution 1 drop  1 drop Both Eyes HS Gage Del Rio MD   1 drop at 07/11/18 2129    levofloxacin (LEVAQUIN) tablet 250 mg  250 mg Oral Q24H Mary Kate Fly, DO   250 mg at 07/11/18 1659    menthol-methyl salicylate (BENGAY) 97-08 % cream   Apply externally 4x Daily PRN Haley Cough, CRNP   1 application at 59/65/41 2008    metoprolol succinate (TOPROL-XL) 24 hr tablet 12 5 mg  12 5 mg Oral Daily Mary Kate Fly, DO   12 5 mg at 07/12/18 0844    multivitamin stress formula tablet 1 tablet  1 tablet Oral Daily Mary Kate Fly, DO   1 tablet at 07/12/18 0847    ondansetron (ZOFRAN) injection 4 mg  4 mg Intravenous Q6H PRN Gage Del Rio MD        pantoprazole (PROTONIX) EC tablet 40 mg  40 mg Oral Early Morning Doreen Reynolds MD   40 mg at 07/12/18 0543    polyethylene glycol (MIRALAX) packet 17 g  17 g Oral BID PRN Nasra Arzate MD   17 g at 18 0818    pravastatin (PRAVACHOL) tablet 40 mg  40 mg Oral Daily With Kobi Hidalgo MD   40 mg at 18 1659    senna (SENOKOT) tablet 8 6 mg  1 tablet Oral HS Cate Martinez MD   8 6 mg at 189    tamsulosin (FLOMAX) capsule 0 4 mg  0 4 mg Oral Daily With Kobi Hidalgo MD   0 4 mg at 18 165    traMADol (ULTRAM) tablet 50 mg  50 mg Oral Q6H PRN Mary Kate Fly, DO   50 mg at 18 1735      Prescriptions Prior to Admission   Medication    acetaminophen (TYLENOL) 650 mg suppository    albuterol (2 5 mg/3 mL) 0 083 % nebulizer solution    aspirin 81 MG tablet    budesonide (PULMICORT) 0 5 mg/2 mL nebulizer solution    docusate sodium (COLACE) 100 mg capsule    famotidine (PEPCID) 20 mg tablet    furosemide (LASIX) 40 mg tablet    glimepiride (AMARYL) 1 mg tablet    latanoprost (XALATAN) 0 005 % ophthalmic solution    metolazone (ZAROXOLYN) 5 mg tablet    metoprolol succinate (TOPROL-XL) 25 mg 24 hr tablet    polyethylene glycol (MIRALAX) 17 g packet    [] predniSONE 10 mg tablet    senna (SENOKOT) 8 6 MG tablet    simvastatin (ZOCOR) 20 mg tablet    sodium chloride (OCEAN) 0 65 % nasal spray    spironolactone (ALDACTONE) 25 mg tablet    umeclidinium-vilanterol (ANORO ELLIPTA) 62 5-25 MCG/INH inhaler    acetaminophen-codeine (TYLENOL #3) 300-30 mg per tablet    cephalexin (KEFLEX) 500 mg capsule       Labs:    Results from last 7 days  Lab Units 18  0508 18  0517 07/10/18  0440   WBC Thousand/uL 8 64 10 87* 10 71*   HEMOGLOBIN g/dL 7 8* 7 9* 8 1*   HEMATOCRIT % 26 5* 27 1* 27 5*   PLATELETS Thousands/uL 203 227 232   NEUTROS PCT % 84* 88* 85*   LYMPHS PCT % 5* 4* 5*   MONOS PCT % 7 6 7   EOS PCT % 3 2 2       Results from last 7 days  Lab Units 18  0508 18  0517 07/10/18  0440  18  0205   SODIUM mmol/L 144 146* 144  < > 141   POTASSIUM mmol/L 3 6 3 5 3 7  < > 3 9   CHLORIDE mmol/L 107 106 103  < > 102   CO2 mmol/L 34* 36* 35*  < > 30   BUN mg/dL 61* 70* 76*  < > 90*   CREATININE mg/dL 1 87* 1 96* 2 36*  < > 2 25*   CALCIUM mg/dL 8 0* 8 0* 8 1*  < > 8 4   TOTAL PROTEIN g/dL  --   --   --   --  6 7   BILIRUBIN TOTAL mg/dL  --   --   --   --  0 40   ALK PHOS U/L  --   --   --   --  63   ALT U/L  --   --   --   --  23   AST U/L  --   --   --   --  25   GLUCOSE RANDOM mg/dL 112 67 76  < > 142*   < > = values in this interval not displayed  Lab Results   Component Value Date    TROPONINI 0 09 (H) 07/03/2018    TROPONINI 0 08 (H) 07/03/2018    TROPONINI 0 10 (H) 07/03/2018    CKTOTAL 56 07/01/2018         Lab Results   Component Value Date    SPUTUMCULTUR 3+ Growth of Pseudomonas aeruginosa (A) 07/05/2018    SPUTUMCULTUR 3+ Growth of  07/05/2018         Imaging:  Results for orders placed during the hospital encounter of 07/01/18   XR chest portable    Narrative CHEST     INDICATION:   Shortness of breath  Congestive failure       COMPARISON:  None    EXAM PERFORMED/VIEWS:  XR CHEST PORTABLE      FINDINGS:  There are median sternotomy wires indicating prior cardiac surgery  Dual-lead left chest pacemaker is noted prosthetic cardiac valve is seen  Heart is enlarged  Atherosclerotic vascular calcifications are noted  Mild pulmonary vascular congestive changes  Left lung base is poorly evaluated there appears to be a left pleural effusion  Trace costophrenic angle effusion on the right  Relative increased parenchymal density in the left perihilar region of the right lower lobe raises the suspicion of either   atelectasis or pneumonia  Osseous structures appear within normal limits for patient age  Impression Cardiomegaly  Mild pulmonary vascular congestive changes  Left greater than right pleural effusions  Relative increased parenchymal density in the left perihilar region and right lower lobe could represent atelectasis or small patchy areas of   pneumonia          Workstation performed: [id number]       Results for orders placed during the hospital encounter of 07/01/18   XR chest pa & lateral    Narrative CHEST     INDICATION:   CHF  Shortness of breath    COMPARISON:  7/6/2018    EXAM PERFORMED/VIEWS:  XR CHEST PA & LATERAL      FINDINGS:  There are median sternotomy wires indicating prior cardiac surgery  Heart shadow is enlarged but unchanged from prior exam   Transvenous pacemaker leads are unchanged in position  There is a prosthetic heart valve  There are small bilateral effusions left greater than right  There is pulmonary congestion  Findings are similar to the prior    Osseous structures appear within normal limits for patient age  Impression Pulmonary congestion with small effusions  Overall findings are similar to the prior study        Workstation performed: [id number] [address]: I have personally reviewed pertinent reports        VTE Pharmacologic Prophylaxis: Reason for no pharmacologic prophylaxis GI bleed/anemia  VTE Mechanical Prophylaxis: sequential compression device    Code Status:   Level 1 - Full Code      "This note has been constructed using a voice recognition system"      Ramo Portillo MD  7/12/2018,2:44 PM

## 2018-07-13 VITALS
WEIGHT: 184.3 LBS | BODY MASS INDEX: 29.62 KG/M2 | DIASTOLIC BLOOD PRESSURE: 56 MMHG | OXYGEN SATURATION: 97 % | HEART RATE: 81 BPM | RESPIRATION RATE: 20 BRPM | SYSTOLIC BLOOD PRESSURE: 109 MMHG | HEIGHT: 66 IN | TEMPERATURE: 96.4 F

## 2018-07-13 PROBLEM — D62 ACUTE BLOOD LOSS ANEMIA: Status: RESOLVED | Noted: 2018-07-01 | Resolved: 2018-07-13

## 2018-07-13 PROBLEM — K59.00 CONSTIPATION: Status: RESOLVED | Noted: 2018-07-07 | Resolved: 2018-07-13

## 2018-07-13 PROBLEM — N17.0 ATN (ACUTE TUBULAR NECROSIS) (HCC): Status: RESOLVED | Noted: 2018-07-01 | Resolved: 2018-07-13

## 2018-07-13 PROBLEM — N17.9 ACUTE KIDNEY INJURY (HCC): Status: RESOLVED | Noted: 2018-07-01 | Resolved: 2018-07-13

## 2018-07-13 LAB
ANION GAP SERPL CALCULATED.3IONS-SCNC: 4 MMOL/L (ref 4–13)
ANION GAP SERPL CALCULATED.3IONS-SCNC: 5 MMOL/L (ref 4–13)
BASOPHILS # BLD AUTO: 0.01 THOUSANDS/ΜL (ref 0–0.1)
BASOPHILS NFR BLD AUTO: 0 % (ref 0–1)
BUN SERPL-MCNC: 52 MG/DL (ref 5–25)
BUN SERPL-MCNC: 53 MG/DL (ref 5–25)
CALCIUM SERPL-MCNC: 7.8 MG/DL (ref 8.3–10.1)
CALCIUM SERPL-MCNC: 8.2 MG/DL (ref 8.3–10.1)
CHLORIDE SERPL-SCNC: 105 MMOL/L (ref 100–108)
CHLORIDE SERPL-SCNC: 108 MMOL/L (ref 100–108)
CO2 SERPL-SCNC: 33 MMOL/L (ref 21–32)
CO2 SERPL-SCNC: 35 MMOL/L (ref 21–32)
CREAT SERPL-MCNC: 1.71 MG/DL (ref 0.6–1.3)
CREAT SERPL-MCNC: 1.8 MG/DL (ref 0.6–1.3)
EOSINOPHIL # BLD AUTO: 0.22 THOUSAND/ΜL (ref 0–0.61)
EOSINOPHIL NFR BLD AUTO: 3 % (ref 0–6)
ERYTHROCYTE [DISTWIDTH] IN BLOOD BY AUTOMATED COUNT: 16.9 % (ref 11.6–15.1)
GFR SERPL CREATININE-BSD FRML MDRD: 32 ML/MIN/1.73SQ M
GFR SERPL CREATININE-BSD FRML MDRD: 34 ML/MIN/1.73SQ M
GLUCOSE SERPL-MCNC: 126 MG/DL (ref 65–140)
GLUCOSE SERPL-MCNC: 134 MG/DL (ref 65–140)
GLUCOSE SERPL-MCNC: 173 MG/DL (ref 65–140)
GLUCOSE SERPL-MCNC: 185 MG/DL (ref 65–140)
HCT VFR BLD AUTO: 27.9 % (ref 36.5–49.3)
HGB BLD-MCNC: 7.9 G/DL (ref 12–17)
IMM GRANULOCYTES # BLD AUTO: 0.03 THOUSAND/UL (ref 0–0.2)
IMM GRANULOCYTES NFR BLD AUTO: 0 % (ref 0–2)
LYMPHOCYTES # BLD AUTO: 0.45 THOUSANDS/ΜL (ref 0.6–4.47)
LYMPHOCYTES NFR BLD AUTO: 6 % (ref 14–44)
MCH RBC QN AUTO: 26.2 PG (ref 26.8–34.3)
MCHC RBC AUTO-ENTMCNC: 28.3 G/DL (ref 31.4–37.4)
MCV RBC AUTO: 93 FL (ref 82–98)
MONOCYTES # BLD AUTO: 0.49 THOUSAND/ΜL (ref 0.17–1.22)
MONOCYTES NFR BLD AUTO: 6 % (ref 4–12)
NEUTROPHILS # BLD AUTO: 6.93 THOUSANDS/ΜL (ref 1.85–7.62)
NEUTS SEG NFR BLD AUTO: 85 % (ref 43–75)
NRBC BLD AUTO-RTO: 0 /100 WBCS
PLATELET # BLD AUTO: 207 THOUSANDS/UL (ref 149–390)
PMV BLD AUTO: 9.9 FL (ref 8.9–12.7)
POTASSIUM SERPL-SCNC: 3.7 MMOL/L (ref 3.5–5.3)
POTASSIUM SERPL-SCNC: 4 MMOL/L (ref 3.5–5.3)
RBC # BLD AUTO: 3.01 MILLION/UL (ref 3.88–5.62)
SODIUM SERPL-SCNC: 143 MMOL/L (ref 136–145)
SODIUM SERPL-SCNC: 147 MMOL/L (ref 136–145)
WBC # BLD AUTO: 8.13 THOUSAND/UL (ref 4.31–10.16)

## 2018-07-13 PROCEDURE — 99232 SBSQ HOSP IP/OBS MODERATE 35: CPT | Performed by: INTERNAL MEDICINE

## 2018-07-13 PROCEDURE — 82948 REAGENT STRIP/BLOOD GLUCOSE: CPT

## 2018-07-13 PROCEDURE — 80048 BASIC METABOLIC PNL TOTAL CA: CPT | Performed by: INTERNAL MEDICINE

## 2018-07-13 PROCEDURE — 99239 HOSP IP/OBS DSCHRG MGMT >30: CPT | Performed by: INTERNAL MEDICINE

## 2018-07-13 PROCEDURE — 94760 N-INVAS EAR/PLS OXIMETRY 1: CPT

## 2018-07-13 PROCEDURE — 94640 AIRWAY INHALATION TREATMENT: CPT

## 2018-07-13 PROCEDURE — 85025 COMPLETE CBC W/AUTO DIFF WBC: CPT | Performed by: INTERNAL MEDICINE

## 2018-07-13 RX ORDER — BISACODYL 10 MG
10 SUPPOSITORY, RECTAL RECTAL ONCE
Status: COMPLETED | OUTPATIENT
Start: 2018-07-13 | End: 2018-07-13

## 2018-07-13 RX ORDER — FINASTERIDE 5 MG/1
5 TABLET, FILM COATED ORAL DAILY
Qty: 30 TABLET | Refills: 0
Start: 2018-07-14 | End: 2018-08-13

## 2018-07-13 RX ORDER — METOPROLOL SUCCINATE 25 MG/1
12.5 TABLET, EXTENDED RELEASE ORAL DAILY
Qty: 15 TABLET | Refills: 0
Start: 2018-07-14 | End: 2018-08-13

## 2018-07-13 RX ORDER — INSULIN GLARGINE 100 [IU]/ML
18 INJECTION, SOLUTION SUBCUTANEOUS
Qty: 10 ML | Refills: 0 | Status: SHIPPED | OUTPATIENT
Start: 2018-07-13

## 2018-07-13 RX ORDER — DEXTROSE MONOHYDRATE 50 MG/ML
100 INJECTION, SOLUTION INTRAVENOUS CONTINUOUS
Status: DISCONTINUED | OUTPATIENT
Start: 2018-07-13 | End: 2018-07-13

## 2018-07-13 RX ORDER — SODIUM CHLORIDE 450 MG/100ML
100 INJECTION, SOLUTION INTRAVENOUS CONTINUOUS
Status: DISPENSED | OUTPATIENT
Start: 2018-07-13 | End: 2018-07-13

## 2018-07-13 RX ORDER — TRAMADOL HYDROCHLORIDE 50 MG/1
50 TABLET ORAL EVERY 8 HOURS PRN
Qty: 6 TABLET | Refills: 0 | Status: SHIPPED | OUTPATIENT
Start: 2018-07-13 | End: 2018-07-15

## 2018-07-13 RX ORDER — FERROUS SULFATE 325(65) MG
325 TABLET ORAL
Qty: 30 TABLET | Refills: 0 | Status: SHIPPED | OUTPATIENT
Start: 2018-07-14 | End: 2018-08-13

## 2018-07-13 RX ORDER — TAMSULOSIN HYDROCHLORIDE 0.4 MG/1
0.4 CAPSULE ORAL
Qty: 30 CAPSULE | Refills: 0
Start: 2018-07-13 | End: 2018-08-12

## 2018-07-13 RX ADMIN — FUROSEMIDE 40 MG: 40 TABLET ORAL at 08:08

## 2018-07-13 RX ADMIN — IPRATROPIUM BROMIDE AND ALBUTEROL SULFATE 3 ML: 2.5; .5 SOLUTION RESPIRATORY (INHALATION) at 13:30

## 2018-07-13 RX ADMIN — PANTOPRAZOLE SODIUM 40 MG: 40 TABLET, DELAYED RELEASE ORAL at 05:39

## 2018-07-13 RX ADMIN — BISACODYL 10 MG: 10 SUPPOSITORY RECTAL at 13:54

## 2018-07-13 RX ADMIN — ZINC 1 TABLET: TAB ORAL at 08:13

## 2018-07-13 RX ADMIN — INSULIN LISPRO 1 UNITS: 100 INJECTION, SOLUTION INTRAVENOUS; SUBCUTANEOUS at 12:46

## 2018-07-13 RX ADMIN — BUDESONIDE 0.5 MG: 0.5 INHALANT RESPIRATORY (INHALATION) at 08:00

## 2018-07-13 RX ADMIN — POLYETHYLENE GLYCOL 3350 17 G: 17 POWDER, FOR SOLUTION ORAL at 08:08

## 2018-07-13 RX ADMIN — SODIUM CHLORIDE 100 ML/HR: 0.45 INJECTION, SOLUTION INTRAVENOUS at 08:39

## 2018-07-13 RX ADMIN — IPRATROPIUM BROMIDE AND ALBUTEROL SULFATE 3 ML: 2.5; .5 SOLUTION RESPIRATORY (INHALATION) at 08:00

## 2018-07-13 RX ADMIN — FINASTERIDE 5 MG: 5 TABLET, FILM COATED ORAL at 08:07

## 2018-07-13 RX ADMIN — FERROUS SULFATE TAB 325 MG (65 MG ELEMENTAL FE) 325 MG: 325 (65 FE) TAB at 08:08

## 2018-07-13 NOTE — PROGRESS NOTES
Progress Note - GI   Jackie Paul 80 y o  male MRN: [id number]  Unit/Bed#: [address] [place] [address] 204-02 Encounter: [id number]      ASSESSMENT  · Symptomatic anemia and GI bleed - erosive gastritis noted  Colonoscopy declined due to age and comorbidities  Anemia likely multifactorial with GI bleed along with a OCD due to chronic kidney disease and diabetes  · Constipation - diet as tolerated  Titrate MiraLax  PLAN  · Diet as tolerated  · Continue PPI  · Follow H&H and transfuse as needed  · Holding on colonoscopy  · Titrate lactulose as needed to facilitate bowel movements  · No further GI interventions    Chief Complaint   Patient presents with    Fall     Patient sent in from [facility] after falling out of his wheelchair today  Staff states this is the 3rd time he has done this in 2 days  Patietn initially complaisned of back pain but none upon arrival       SUBJECTIVE/HPI   Denies abdominal pain nausea or vomiting  No bowel movements for 4 or 5 days  Tolerating diet  /56 (BP Location: Right arm)   Pulse 81   Temp (!) 96 4 °F (35 8 °C) (Tympanic)   Resp 20   Ht 5' 6" (1 676 m)   Wt 83 6 kg (184 lb 4 9 oz)   SpO2 96%   BMI 29 75 kg/m²       PHYSICALEXAM    Awake and answering questions appropriately  Lungs decreased but clear breath sounds  Heart distant S1-S2  Abdomen distended but soft and nontender with no guarding or rebound        Lab Results   Component Value Date    GLUCOSE 173 (H) 07/13/2018    CALCIUM 8 2 (L) 07/13/2018     07/13/2018    K 4 0 07/13/2018    CO2 33 (H) 07/13/2018     07/13/2018    BUN 52 (H) 07/13/2018    CREATININE 1 80 (H) 07/13/2018     Lab Results   Component Value Date    WBC 8 13 07/13/2018    HGB 7 9 (L) 07/13/2018    HCT 27 9 (L) 07/13/2018    MCV 93 07/13/2018     07/13/2018     Lab Results   Component Value Date    ALT 23 07/06/2018    AST 25 07/06/2018    ALKPHOS 63 07/06/2018    BILITOT 0 40 07/06/2018     No components found for: AMYLJKJJJASE  No results found for: LIPASE  Lab Results   Component Value Date    IRON 18 (L) 07/02/2018    TIBC 423 07/02/2018    FERRITIN 47 07/02/2018     Lab Results   Component Value Date    INR 1 17 07/01/2018         Keith Carranza MD

## 2018-07-13 NOTE — DISCHARGE INSTRUCTIONS
Follow-up with PCP in 1 week  Follow-up with Urology as outpatient for voiding trial   Repeat basic metabolic panel on Monday July 16, 2018  Follow-up with the nephrology and orthopedics as outpatient  Return to ER with any worsening shortness of breath, altered mental status, chest pain, palpitation, fever, chills or any other alarming symptoms

## 2018-07-13 NOTE — PLAN OF CARE
Problem: OCCUPATIONAL THERAPY ADULT  Goal: Performs self-care activities at highest level of function for planned discharge setting  See evaluation for individualized goals  Treatment Interventions: ADL retraining, UE strengthening/ROM          See flowsheet documentation for full assessment, interventions and recommendations  Outcome: Progressing  Limitation: Decreased ADL status, Decreased UE strength, Decreased cognition, Decreased high-level ADLs, Decreased endurance  Prognosis: Fair  Assessment: Patient seen for OT session focusing on patient/family education on presure relief techs to buttocks while sitting in chair, BUE AAROM exercises and sitting balance/posture activities for unsupported sitting while sitting upright in chair  Patient more alert today and very participatory, very motivated to work with OT and required little to no engcouragement for task engagement  Patient fatigues quickly and requires 2-3 min rests between each task  From OT standpoint, recommed discharge to in-patient rehab when medically stable         OT Discharge Recommendation: Short Term Rehab

## 2018-07-13 NOTE — PROGRESS NOTES
NEPHROLOGY PROGRESS NOTE    Jona Gordon 80 y o  male MRN: [id number]  Unit/Bed#: [address] [place] [address]  Encounter: [id number]  Reason for Consult:  Acute on chronic kidney disease    ASSESSMENT/PLAN:  1  Renal     Patient acute on chronic kidney disease  Creatinine has declined back towards baseline  It is likely related to change in volume status and cardiac function  He has now been placed on oral maintenance diuretic is relatively stable clinically  Stable on current dose of diuretic  Monitor volume status urine output and labs periodically  2   GI  Earlier in the hospitalization patient had symptomatic anemia with melena  He underwent colonoscopy in supportive care  This issue appears to be stable  SUBJECTIVE:  Review of Systems   Constitution: Negative for chills and fever  HENT: Negative  Eyes: Negative  Cardiovascular: Negative for chest pain  Respiratory: Negative for cough and shortness of breath  Gastrointestinal: Negative  Genitourinary:        Positive Moralez       OBJECTIVE:  Current Weight: Weight - Scale: 83 6 kg (184 lb 4 9 oz)  Vitals:Temp (24hrs), Av 3 °F (36 3 °C), Min:96 4 °F (35 8 °C), Max:98 °F (36 7 °C)  Current: Temperature: (!) 96 4 °F (35 8 °C)   Blood pressure 109/56, pulse 81, temperature (!) 96 4 °F (35 8 °C), temperature source Tympanic, resp  rate 20, height 5' 6" (1 676 m), weight 83 6 kg (184 lb 4 9 oz), SpO2 96 %  , Body mass index is 29 75 kg/m²  Intake/Output Summary (Last 24 hours) at 18 1141  Last data filed at 18 0700   Gross per 24 hour   Intake                0 ml   Output             1425 ml   Net            -1425 ml       Physical Exam: /56 (BP Location: Right arm)   Pulse 81   Temp (!) 96 4 °F (35 8 °C) (Tympanic)   Resp 20   Ht 5' 6" (1 676 m)   Wt 83 6 kg (184 lb 4 9 oz)   SpO2 96%   BMI 29 75 kg/m²   Physical Exam   Constitutional: No distress  HENT:   Mouth/Throat: No oropharyngeal exudate     Eyes: No scleral icterus  Neck: Neck supple  No JVD present  Cardiovascular: Normal rate  Exam reveals no friction rub  Positive edema   Pulmonary/Chest: Effort normal  No respiratory distress  He has no rales  Abdominal: Soft  Bowel sounds are normal  He exhibits no distension  There is no tenderness         Medications:    Current Facility-Administered Medications:     acetaminophen (TYLENOL) tablet 650 mg, 650 mg, Oral, Q6H PRN, Tru Lopez MD, 650 mg at 07/01/18 2016    albuterol inhalation solution 2 5 mg, 2 5 mg, Nebulization, Q6H PRN, Tru Lopez MD, 2 5 mg at 07/01/18 1954    budesonide (PULMICORT) inhalation solution 0 5 mg, 0 5 mg, Nebulization, BID, Tru Lopez MD, 0 5 mg at 07/13/18 0800    ferrous sulfate tablet 325 mg, 325 mg, Oral, Daily With Breakfast, Mary Kate Joseph DO, 325 mg at 07/13/18 0808    finasteride (PROSCAR) tablet 5 mg, 5 mg, Oral, Daily, Marilee Marquez PA-C, 5 mg at 07/13/18 0807    furosemide (LASIX) tablet 40 mg, 40 mg, Oral, BID (diuretic), Tru Lopez MD, 40 mg at 07/13/18 0808    HYDROcodone-acetaminophen (NORCO) 5-325 mg per tablet 1 tablet, 1 tablet, Oral, Q6H PRN, Tru Lopez MD, 1 tablet at 07/09/18 1603    insulin glargine (LANTUS) subcutaneous injection 18 Units 0 18 mL, 18 Units, Subcutaneous, HS, Preet Ortiz MD, 18 Units at 07/12/18 2225    insulin lispro (HumaLOG) 100 units/mL subcutaneous injection 1-5 Units, 1-5 Units, Subcutaneous, HS, ROMAINE Dillon, 1 Units at 07/12/18 2225    insulin lispro (HumaLOG) 100 units/mL subcutaneous injection 1-6 Units, 1-6 Units, Subcutaneous, TID AC, 1 Units at 07/12/18 1702 **AND** Fingerstick Glucose (POCT), , , TID AC, Tru Lopez MD    insulin lispro (HumaLOG) 100 units/mL subcutaneous injection 5 Units, 5 Units, Subcutaneous, TID With Meals, Preet Ortiz MD, 5 Units at 07/13/18 0809    iohexol (OMNIPAQUE) 240 MG/ML solution 50 mL, 50 mL, Oral, Once in imaging, Louie Meza Deidre De Jesus MD    ipratropium-albuterol (DUO-NEB) 0 5-2 5 mg/3 mL inhalation solution 3 mL, 3 mL, Nebulization, TID, Gurpreet Robles DO, 3 mL at 07/13/18 0800    latanoprost (XALATAN) 0 005 % ophthalmic solution 1 drop, 1 drop, Both Eyes, HS, Deacon Crisostomo MD, 1 drop at 07/12/18 2226    levofloxacin (LEVAQUIN) tablet 250 mg, 250 mg, Oral, Q24H, Mary Kate Fly, DO, 250 mg at 07/12/18 1704    menthol-methyl salicylate (BENGAY) 47-70 % cream, , Apply externally, 4x Daily PRN, ROMAINE Barnes, 1 application at 07/44/58 2008    metoprolol succinate (TOPROL-XL) 24 hr tablet 12 5 mg, 12 5 mg, Oral, Daily, Mary Kate Fly, DO, 12 5 mg at 07/12/18 0844    multivitamin stress formula tablet 1 tablet, 1 tablet, Oral, Daily, Mary Kate Fly, DO, 1 tablet at 07/13/18 0813    ondansetron (ZOFRAN) injection 4 mg, 4 mg, Intravenous, Q6H PRN, Deacon Crisostomo MD    pantoprazole (PROTONIX) EC tablet 40 mg, 40 mg, Oral, Early Morning, Garima Denton MD, 40 mg at 07/13/18 0539    polyethylene glycol (MIRALAX) packet 17 g, 17 g, Oral, BID, Jean Velez MD, 17 g at 07/13/18 0808    pravastatin (PRAVACHOL) tablet 40 mg, 40 mg, Oral, Daily With [address]hti Street MD, 40 mg at 07/12/18 1704    senna (SENOKOT) tablet 8 6 mg, 1 tablet, Oral, HS, Yousif Bradshaw MD, 8 6 mg at 07/11/18 2129    tamsulosin (FLOMAX) capsule 0 4 mg, 0 4 mg, Oral, Daily With [address] Street MD, 0 4 mg at 07/12/18 1704    traMADol (ULTRAM) tablet 50 mg, 50 mg, Oral, Q6H PRN, Mary Kate Fly, DO, 50 mg at 07/03/18 1735    Laboratory Results:  Lab Results   Component Value Date    WBC 8 13 07/13/2018    HGB 7 9 (L) 07/13/2018    HCT 27 9 (L) 07/13/2018    MCV 93 07/13/2018     07/13/2018     Lab Results   Component Value Date    GLUCOSE 173 (H) 07/13/2018    CALCIUM 8 2 (L) 07/13/2018     07/13/2018    K 4 0 07/13/2018    CO2 33 (H) 07/13/2018     07/13/2018    BUN 52 (H) 07/13/2018    CREATININE 1 80 (H) 07/13/2018     Lab Results Component Value Date    CALCIUM 8 2 (L) 07/13/2018    PHOS 4 4 (H) 07/02/2018     No results found for: LABPROT

## 2018-07-13 NOTE — DISCHARGE SUMMARY
"              After Visit Summary   6/27/2017    Toby Mcgrath    MRN: [id number]           Patient Information     Date Of Birth          1953        Visit Information        Provider Department      6/27/2017 11:15 AM Florence Amato, PT [facility]        Today's Diagnoses     Chronic pain    -  1    Failed back syndrome of lumbar spine           Follow-ups after your visit        Your next 10 appointments already scheduled     Jun 29, 2017  9:30 AM CDT   Return Visit with JIMBO Sutotn CNP   [facility] ([facility])    [address]  [facility] [address]  [facility] [address] [phone]              Who to contact     If you have questions or need follow up information about today's clinic visit or your schedule please contact [facility] directly at [phone].  Normal or non-critical lab and imaging results will be communicated to you by MyChart, letter or phone within 4 business days after the clinic has received the results. If you do not hear from us within 7 days, please contact the clinic through MyChart or phone. If you have a critical or abnormal lab result, we will notify you by phone as soon as possible.  Submit refill requests through [employer] or call your pharmacy and they will forward the refill request to us. Please allow 3 business days for your refill to be completed.          Additional Information About Your Visit        MyChart Information     [employer] lets you send messages to your doctor, view your test results, renew your prescriptions, schedule appointments and more. To sign up, go to [email or web address] . Click on \"Log in\" on the left side of the screen, which will take you to the Welcome page. Then click on \"Sign up Now\" on the right side of the page.     You will be asked to enter the access code listed below, as well as some personal information. Please follow the directions to create your " Discharge Summary - Jona Gordon 80 y o  male MRN: [id number]  Unit/Bed#: [address] [place], [place] [address] Encounter: [id number]    Admission Date:    7/1/2018   Discharge Date:   07/13/18   Admitting Diagnosis:   Contusion of flank [S30 1XXA]  Renal failure [N19]  Anemia [D64 9]  Heme positive stool [R19 5]  Pulmonary nodule [R91 1]  Admitting Provider:   Neha Huang MD  Discharge Provider:   Neha Huang MD     Primary Care Physician at Discharge:   ALBINA Pineda,[phone]    HPI:   77-year-old male who presented to emergency department from assisted living with fall  For a detailed HPI please refer to this writer's admission note  Procedures Performed:   Orders Placed This Encounter   Procedures    ED ECG Documentation Only       Hospital Course:   Patient was admitted with melena/GI bleed, acute blood loss anemia, acute kidney injury on chronic kidney disease stage 3, fall  He was started on Protonix 40 mg IV q 12 hours  Patient baseline creatinine is 1 52  His diuretics were held  Patient was taking Zaroxolyn, Lasix and spironolactone prior to coming to the hospital   Patient has chronic respiratory failure secondary to COPD and uses 2 L of oxygen at home  Patient was given 1 unit of packed red cell transfusion  He was seen by Gastroenterology and Nephrology  Patient underwent endoscopy which showed moderately severe erosive gastritis  During this admission patient also complained of urinary retention and was seen by urologist   Urologist recommended patient to be discharged on Flomax and finasteride and follow up with them as outpatient  He had a Moralez catheter placed  Patient was started on gentle IV fluids  Patient was found to have Pseudomonas in sputum culture and completed the course of antibiotics  Patient complained of left shoulder pain and was seen by Orthopedics  Did not have any fractures    They recommended PT for exercises and outpatient follow-up with them in 4-6 weeks if symptoms username and password.     Your access code is: [email or web address]  Expires: 7/10/2017  1:17 PM     Your access code will  in 90 days. If you need help or a new code, please call your [facility] clinic or [phone].        Care EveryWhere ID     This is your Care EveryWhere ID. This could be used by other organizations to access your [facility] medical records  [id number]         Blood Pressure from Last 3 Encounters:   17 128/78   17 142/82   17 117/82    Weight from Last 3 Encounters:   17 113.5 kg (250 lb 3 oz)   17 113.9 kg (251 lb)   17 113.9 kg (251 lb)              We Performed the Following     SELF CARE MNGMENT TRAINING     THERAPEUTIC EXERCISES        Primary Care Provider Office Phone # Fax #    Connie Haskins [phone]       [facility] [address] [address]        Equal Access to Services     NICK WEBER : Hadii aad ku hadasho Soomaali, waaxda luqadaha, qaybta kaalmada adeegyada, gayle smith . So [facility] [phone].    ATENCIÓN: Si habla español, tiene a holliday disposición servicios gratuitos de asistencia lingüística. Llame al [phone].    We comply with applicable federal civil rights laws and [place] laws. We do not discriminate on the basis of race, color, national origin, age, disability sex, sexual orientation or gender identity.            Thank you!     Thank you for choosing [facility]  for your care. Our goal is always to provide you with excellent care. Hearing back from our patients is one way we can continue to improve our services. Please take a few minutes to complete the written survey that you may receive in the mail after your visit with us. Thank you!             Your Updated Medication List - Protect others around you: Learn how to safely use, store and throw away your medicines at www.disposemymeds.org.          This list is accurate  worsen  H&H remained stable  GI recommended to holding of colonoscopy until unless there is an active bleeding  Patient was started on Lasix because of acute on chronic systolic heart failure  Lasix was held subsequently as creatinine started to go up  Patient was monitored over the next few days off diuresis  Patient's creatinine started to trend downwards  Patient was seen by speech and swallow evaluation who recommended mechanical soft with thin liquids  Patient's creatinine came down to 1 7-1 8  Patient was started back on Lasix  Patient was cleared from nephrology standpoint for discharge  Discussed with patient and family at length and they are in agreement with the plan  Patient will be discharged with Moralez catheter and will follow up with Urology for voiding trial as outpatient  Patient was seen by Physical therapy and  and will be discharged to skilled nursing rehab at [facility]  Patient is hemodynamically stable for discharge  Follow-up with PCP in 1 week  Follow-up with Urology as outpatient for voiding trial   Repeat basic metabolic panel on Monday July 16, 2018  Follow-up with the nephrology and orthopedics as outpatient  Return to ER with any worsening shortness of breath, altered mental status, chest pain, palpitation, fever, chills or any other alarming symptoms  Consulting Providers   Gastroenterology, Orthopedics, Nephrology, Pulmonary and Urology      Complications:  None    Labs:   Lab Results   Component Value Date    WBC 8 13 07/13/2018    RBC 3 01 (L) 07/13/2018    HGB 7 9 (L) 07/13/2018    HCT 27 9 (L) 07/13/2018    MCV 93 07/13/2018    MCH 26 2 (L) 07/13/2018    RDW 16 9 (H) 07/13/2018     07/13/2018     Lab Results   Component Value Date    CREATININE 1 80 (H) 07/13/2018    BUN 52 (H) 07/13/2018     07/13/2018    K 4 0 07/13/2018     07/13/2018    CO2 33 (H) 07/13/2018    GLUCOSE 173 (H) 07/13/2018    PROT 6 7 07/06/2018    ALKPHOS 63 07/06/2018    ALT 23 07/06/2018    AST 25 07/06/2018       Treatments:  IV hydration and Lasix, Levaquin, Flomax, finasteride, Pravachol, Toprol-XL, Lantus insulin, bronchodilators, iron  Discharge Diagnosis:   Principal Problem:    Acute erosive gastritis  Active Problems:    Acute kidney injury (HCC)    Chronic kidney disease, stage 3    Type 2 diabetes mellitus with stage 3 chronic kidney disease, without long-term current use of insulin (HCC)    Acute on chronic systolic congestive heart failure (HCC)    Acute blood loss anemia    Chronic respiratory failure with hypoxia (HCC)    Hypertension    COPD (chronic obstructive pulmonary disease) (HCC)    Contusion of flank    Hyperlipidemia    Cancer (HCC)    ATN (acute tubular necrosis) (HCC)    Parenchymal renal hypertension    Benign prostatic hyperplasia with urinary retention    Acute pain of left shoulder    Constipation  Resolved Problems:    * No resolved hospital problems  *      Condition at Discharge:   Good     Code Status: Level 1 - Full Code  Advance Directive and Living Will: <no information>  Power of :    POLST:      Discharge instructions/Information to patient and family:   See after visit summary for information provided to patient and family  Provisions for Follow-Up Care:  See after visit summary for information related to follow-up care and any pertinent home health orders  Disposition:   Skilled nursing facility at [facility] Readmission:   No    Discharge Statement   I spent 40 minutes discharging the patient  This time was spent on the day of discharge  I had direct contact with the patient on the day of discharge  Greater than 50% of the total time was spent examining patient, answering all patient questions, arranging and discussing plan of care with patient as well as directly providing post-discharge instructions  Additional time then spent on discharge activities      Discharge Medications:  See after as of: 6/27/17  3:23 PM.  Always use your most recent med list.                   Brand Name Dispense Instructions for use Diagnosis    allopurinol 100 MG tablet    ZYLOPRIM    270 tablet    Take 3 tablets (300 mg) by mouth daily    Acute gout of right elbow, unspecified cause       aspirin 81 MG tablet     100 tablet    Take 1 tablet (81 mg) by mouth daily    Type 2 diabetes, HbA1C goal < 8% (H)       blood glucose monitoring lancets     3 Box    Use to test blood sugars 2-3 times daily as directed (lena contour meter)    Type 2 diabetes, HbA1C goal < 8% (H)       blood glucose monitoring test strip    no brand specified    3 Box    Use to test blood sugar 2-3 times daily (lena contour meter)    Type 2 diabetes, HbA1C goal < 8% (H)       Colchicine 0.6 MG Caps    MITIGARE    20 capsule    Take 0.6 mg by mouth 2 times daily as needed    Acute pain of right knee       cyclobenzaprine 5 MG tablet    FLEXERIL    30 tablet    Take 1-2 tablets (5-10 mg) by mouth nightly as needed for muscle spasms    Back muscle spasm       finasteride 5 MG tablet    PROSCAR    30 tablet    Take 1 tablet (5 mg) by mouth daily For prostate enlargement. Please fill on $4 list    Benign prostatic hyperplasia without lower urinary tract symptoms, unspecified morphology       FLUoxetine 20 MG capsule    PROzac    270 capsule    TAKE THREE CAPSULES BY MOUTH ONCE DAILY    Mild episode of recurrent major depressive disorder (H)       fluticasone 50 MCG/ACT spray    FLONASE    3 Bottle    Spray 1-2 sprays into both nostrils daily    Chronic rhinitis       gabapentin 300 MG capsule    NEURONTIN    180 capsule    Take 2 capsules (600 mg) by mouth 3 times daily . Follow MD instructions for titration.    Chronic lumbosacral pain       metFORMIN 500 MG 24 hr tablet    GLUCOPHAGE-XR    90 tablet    Take 1 tablet (500 mg) by mouth daily (with dinner)    Type 2 diabetes mellitus without complication, without long-term current use of insulin (H)        omeprazole 20 MG CR capsule    priLOSEC    90 capsule    Use every other day as needed    Gastroesophageal reflux disease without esophagitis       oxyCODONE 5 MG IR tablet    ROXICODONE    180 tablet    1 tab q4h, PRN max 6 per day. May dispense on/after 6/4/17 30 days supply    Failed back syndrome of lumbar spine       senna-docusate 8.6-50 MG per tablet    SENOKOT-S;PERICOLACE    60 tablet    Take 1 tablet by mouth 2 times daily    Lumbar radiculopathy       simvastatin 20 MG tablet    ZOCOR    90 tablet    Take 1 tablet (20 mg) by mouth At Bedtime    Hyperlipidemia LDL goal <100       tamsulosin 0.4 MG capsule    FLOMAX    180 capsule    TAKE TWO CAPSULES BY MOUTH ONCE DAILY    Hypertrophy of prostate with urinary obstruction       triamcinolone 0.1 % cream    KENALOG    30 g    Apply sparingly to affected area twice daily for 7 days. Then stop and use only for flares    Eczema, unspecified type          visit summary for reconciled discharge medications provided to patient and family        "This note has been constructed using a voice recognition system"    Ramy Morfin MD  7/13/2018,11:41 AM

## 2018-07-13 NOTE — OCCUPATIONAL THERAPY NOTE
Occupational Therapy Treatment Note      Darrin Rashid    7/13/2018    Patient Active Problem List   Diagnosis    Acute kidney injury ([facility] )    Chronic kidney disease, stage 3    Type 2 diabetes mellitus with stage 3 chronic kidney disease, without long-term current use of insulin (HCC)    [facility] on [facility] )    Acute blood loss anemia    Chronic respiratory failure with hypoxia ([place] [address] )    Hypertension    COPD (chronic obstructive pulmonary disease) ([place] [address] )    Contusion of flank    Hyperlipidemia    Cancer ([place] [address] )    ATN (acute tubular necrosis) (HCC)    Parenchymal renal hypertension    Benign prostatic hyperplasia with urinary retention    Acute pain of left shoulder    Acute erosive gastritis    Constipation       Past Medical History:   Diagnosis Date    Aortic regurgitation     Atrial fibrillation (HCC)     Cancer (HCC)     colon    CHF (congestive heart failure) (HCC)     Complete heart block (HCC)     post CABG    COPD (chronic obstructive pulmonary disease) ([place] [address] )     Coronary artery disease     MI    Diabetes mellitus ([place] [address] )     Dissecting aortic aneurysm (HCC)     Endocarditis     Hx of bleeding disorder     GI bleed on aspirin    Hypertension     Mitral regurgitation     Skin cancer        Past Surgical History:   Procedure Laterality Date    AORTIC VALVE REPLACEMENT      CARDIAC PACEMAKER PLACEMENT      [employer]     CARDIAC SURGERY      CABG on physician notes, none per pt   CHOLECYSTECTOMY      COLECTOMY      ESOPHAGOGASTRODUODENOSCOPY N/A 7/2/2018    Procedure: ESOPHAGOGASTRODUODENOSCOPY (EGD);   Surgeon: Kristine Mcguire MD;  Location:  MAIN OR;  Service: Gastroenterology    FLAP LOCAL HEAD / NECK N/A 5/19/2016    Procedure: VERSUS FLAP ;  Surgeon: Azam Gil MD;  Location:  MAIN OR;  Service:     JOINT REPLACEMENT Left     hip    MITRAL VALVE REPLACEMENT      MOHS RECONSTRUCTION N/A 5/19/2016    Procedure: RECONSTRUCTION MOHS DEFECT NOSE AND SCALP;  Surgeon: Lynn Lamar MD;  Location: [facility];  Service:     SKIN BIOPSY          07/12/18 1205   Restrictions/Precautions   Other Precautions Fall Risk   Pain Assessment   Pain Type Chronic pain   Pain Location Buttocks   Pain Orientation Right;Left   Pain Descriptors Burning   Pain Frequency Constant/continuous   Effect of Pain on Daily Activities difficulty with sitting long periods of time    Pain Rating: FLACC (Rest) - Face 1   Pain Rating: FLACC (Rest) - Legs 1   Pain Rating: FLACC (Rest) - Activity 1   Pain Rating: FLACC (Rest) - Cry 1   Pain Rating: FLACC (Rest) - Consolability 1   Score: FLACC (Rest) 5   Pain Rating: FLACC (Activity) - Face 2   Pain Rating: FLACC (Activity) - Legs 1   Pain Rating: FLACC (Activity) - Activity 1   Pain Rating: FLACC (Activity) - Cry 1   Pain Rating: FLACC (Activity) - Consolability 2   Score: FLACC (Activity) 7   Therapeutic Exercise - ROM   UE-ROM Yes   ROM- Right Upper Extremities   R Shoulder AAROM;Prolonged stretch; Flexion;ABduction; Extension;Horizontal ABduction; External rotation; Internal rotation   R Elbow AAROM;Prolonged stretch;Elbow flexion;Elbow extension   R Wrist AROM   R Hand AROM   ROM - Left Upper Extremities    L Shoulder AAROM;Prolonged stretch; Flexion;ABduction; Extension;Horizontal ABduction; External rotation; Internal rotation   L Elbow AAROM;Elbow flexion;Elbow extension;Prolonged stretch   L Wrist Wrist flexion;Wrist extension   Neuromuscular Education   Trunk Control (unsupported sitting balance, posture and trunk control exerc)   Cognition   Arousal/Participation Alert; Responsive  (minimal verbal responses but eyes opened more and interative)   Additional Activities   Additional Activities (sit to stand- wheelchair push-ups pressure relief )   Additional Activities Comments wheelchair push-ups 3 sets/5 reps with verbal cues for tech    Activity Tolerance   Activity Tolerance Patient limited by fatigue  (requires frequent rest breaks between each activity )   Assessment   Assessment Patient seen for OT session focusing on patient/family education on presure relief techs to buttocks while sitting in chair, BUE AAROM exercises and sitting balance/posture activities for unsupported sitting while sitting upright in chair  Patient more alert today and very participatory, very motivated to work with OT and required little to no engcouragement for task engagement  Patient fatigues quickly and requires 2-3 min rests between each task  From OT standpoint, recommed discharge to in-patient rehab when medically stable       Recommendation   OT Discharge Recommendation Short Term Rehab   Quin Schaumann, [place]

## 2024-09-04 NOTE — PLAN OF CARE
Problem: PHYSICAL THERAPY ADULT  Goal: Performs mobility at highest level of function for planned discharge setting  See evaluation for individualized goals  Treatment/Interventions: Functional transfer training, LE strengthening/ROM, Therapeutic exercise, Endurance training, Bed mobility, Gait training, Spoke to nursing, OT  Equipment Recommended: Sherri Regalado, PT         See flowsheet documentation for full assessment, interventions and recommendations  Outcome: Progressing  Prognosis: Fair  Problem List: Decreased strength, Decreased endurance, Impaired balance, Decreased mobility, Decreased cognition, Pain  Assessment: Pt appears to exhibit more guarding w/ mobility today; ? related to buttock discomfort; LE therex performed in supine and sitting in an AAROM mode w/ rest periods provided as needed; (A)x2 was required for the most part w/ all aspects of limited mobility at bedside; pt remained in NAD and appeared to be more comfortable at the end of session; currently, cont to recommend rehab upon D/C when medically cleared; will follow  Recommendation: Post acute IP rehab          See flowsheet documentation for full assessment  Initiate Treatment: Sun screen (SPF 30 or greater) should be applied every 1.5-2 hours, during peak UV exposure (between 10am and 2pm) and reapplied after exercise or swimming. Plan: The ABCDEs of melanoma were reviewed with the patient, and the importance of monthly self-examination of moles was emphasized. Should any moles change in shape or color, or itch, bleed or burn, pt will contact our office for evaluation sooner then their interval appointment.\\nSun protective clothing is also effective at protecting against UV rays that cause skin cancer. Detail Level: Zone decreased po intake

## 2025-05-25 NOTE — CONSULTS
Pulmonary Consultation   Elke Vigil 80 y o  male MRN: [id number]  Unit/Bed#: -01 Encounter: [id number]      Reason for consultation: Possible pneumonia    Requesting physician: Dr Yola Quijano    Impressions/Plans:    1  Abnormal chest xray with right basilar opacity concerning for aspiration pneumonia  The patient is clearly at risk for aspiration as he is slightly lethargic and had a procedure yesterday  He also is at risk for pulmonary edema as his BNP is elevated  · Diuresis per primary team  · Continue empiric antibiotics with anaerobic coverage  · Speech therapy evaluation for aspiration    2  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  · Continue Pulmicort b i d  · Discontinue Advair  · Start DuoNeb t i d   · Resume home prednisone 10 mg daily    3  Acute/chronic hypoxic respiratory failure - multifactorial  · Wean supplemental oxygen for saturations greater than 88%  · Check ABG in a m  to assess for hypercapnia      HOME COPD regimen:  Anoro daily, budesonide nebulizer b i d , prednisone 10 mg daily, albuterol p r n  History of Present Illness   HPI:  Elke Vigil is a 80 y o  male who presented to [facility] after a fall the  He was found to be anemic and was admitted for workup of GI bleed  The patient is not a great historian and therefore I was able to obtain most of his history from his son at the bedside as well as his chart  His son tells me that in February of this past year he was admitted to the hospital for a COPD exacerbation  They were unsure of the diagnosis of COPD or emphysema prior to that evaluation  They were not explained the diagnosis even they read it on discharge paperwork  Since that time his breathing had improved because he was started on treatment and supplemental oxygen  He was seen by Pulmonary last week and started on Anoro once daily, budesonide twice daily, prednisone 10 mg daily and albuterol as needed      At baseline the patient has some mild shortness of breath on exertion that acutely worsened over the past couple of weeks  He denies any significant cough, wheezing or chest pain  After EGD yesterday he was noticed to have some mild decrease in his oxygenation  He had some mild cough and was found to have crackles on exam  Chest x-ray showed new right basilar infiltrate and there was concern for aspiration pneumonia  The patient reports feeling tired/fatigued  He is hungry  His lower extremity edema has improved  He has noticed no significant change in his weight  He denies any hemoptysis  Review of systems:  12 point review of systems was completed and was otherwise negative except as listed in HPI  Historical Information   Past Medical History:   Diagnosis Date    Aortic regurgitation     Atrial fibrillation (HCC)     Cancer (HCC)     colon    CHF (congestive heart failure) (HCC)     Complete heart block (HCC)     post CABG    COPD (chronic obstructive pulmonary disease) (HCC)     Coronary artery disease     MI    Diabetes mellitus ([address] )     Dissecting aortic aneurysm (HCC)     Endocarditis     Hx of bleeding disorder     GI bleed on aspirin    Hypertension     Mitral regurgitation     Skin cancer      Past Surgical History:   Procedure Laterality Date    AORTIC VALVE REPLACEMENT      CARDIAC PACEMAKER PLACEMENT      [employer]     CARDIAC SURGERY      CABG on physician notes, none per pt   CHOLECYSTECTOMY      COLECTOMY      ESOPHAGOGASTRODUODENOSCOPY N/A 7/2/2018    Procedure: ESOPHAGOGASTRODUODENOSCOPY (EGD);   Surgeon: Zeke Parker MD;  Location: [facility];  Service: Gastroenterology    FLAP LOCAL HEAD / NECK N/A 5/19/2016    Procedure: VERSUS FLAP ;  Surgeon: Kenna Chowdhury MD;  Location: [facility];  Service:     JOINT REPLACEMENT Left     hip    MITRAL VALVE REPLACEMENT      MOHS RECONSTRUCTION N/A 5/19/2016    Procedure: RECONSTRUCTION MOHS DEFECT NOSE AND SCALP;  Surgeon: Geovany Rosales MD;  Location: [facility] OR;  Service:    [facility] SKIN BIOPSY       Family History   Problem Relation Age of Onset    COPD Father        Occupational History:  Retired flores    Social History:  History   Smoking Status    Former Smoker    Packs/day: 1 00    Years: 20 00    Types: Cigarettes    Quit date: 1960   Smokeless Tobacco    Former User    Quit date: 1970     History   Alcohol Use    Yes     Comment: rarely     History   Drug Use No     Marital Status:        Meds/Allergies   Current Facility-Administered Medications   Medication Dose Route Frequency    acetaminophen (TYLENOL) tablet 650 mg  650 mg Oral Q6H PRN    albuterol inhalation solution 2 5 mg  2 5 mg Nebulization Q6H PRN    budesonide (PULMICORT) inhalation solution 0 5 mg  0 5 mg Nebulization BID    cefTRIAXone (ROCEPHIN) IVPB (premix) 1,000 mg  1,000 mg Intravenous Q24H    fluticasone-salmeterol (ADVAIR) 250-50 mcg/dose inhaler 1 puff  1 puff Inhalation Q12H    furosemide (LASIX) tablet 40 mg  40 mg Oral BID (diuretic)    glimepiride (AMARYL) tablet 1 mg  1 mg Oral Daily With Breakfast    HYDROcodone-acetaminophen (NORCO) 5-325 mg per tablet 1 tablet  1 tablet Oral Q6H PRN    insulin lispro (HumaLOG) 100 units/mL subcutaneous injection 1-5 Units  1-5 Units Subcutaneous HS    insulin lispro (HumaLOG) 100 units/mL subcutaneous injection 1-6 Units  1-6 Units Subcutaneous TID AC    latanoprost (XALATAN) 0 005 % ophthalmic solution 1 drop  1 drop Both Eyes HS    menthol-methyl salicylate (BENGAY) 32-35 % cream   Apply externally 4x Daily PRN    metoprolol succinate (TOPROL-XL) 24 hr tablet 25 mg  25 mg Oral Daily    metroNIDAZOLE (FLAGYL) IVPB (premix) 500 mg  500 mg Intravenous Q8H    ondansetron (ZOFRAN) injection 4 mg  4 mg Intravenous Q6H PRN    ondansetron (ZOFRAN) injection 4 mg  4 mg Intravenous Once PRN    pantoprazole (PROTONIX) EC tablet 40 mg  40 mg Oral Early Morning    pravastatin (PRAVACHOL) tablet 40 mg  40 mg Oral Daily With Dinner    sitaGLIPtin (JANUVIA) tablet 25 mg  25 mg Oral Daily    traMADol (ULTRAM) tablet 50 mg  50 mg Oral Q6H PRN     Prescriptions Prior to Admission   Medication    acetaminophen (TYLENOL) 650 mg suppository    albuterol (2 5 mg/3 mL) 0 083 % nebulizer solution    aspirin 81 MG tablet    budesonide (PULMICORT) 0 5 mg/2 mL nebulizer solution    docusate sodium (COLACE) 100 mg capsule    famotidine (PEPCID) 20 mg tablet    furosemide (LASIX) 40 mg tablet    glimepiride (AMARYL) 1 mg tablet    latanoprost (XALATAN) 0 005 % ophthalmic solution    metolazone (ZAROXOLYN) 5 mg tablet    metoprolol succinate (TOPROL-XL) 25 mg 24 hr tablet    polyethylene glycol (MIRALAX) 17 g packet    predniSONE 10 mg tablet    senna (SENOKOT) 8 6 MG tablet    simvastatin (ZOCOR) 20 mg tablet    sodium chloride (OCEAN) 0 65 % nasal spray    spironolactone (ALDACTONE) 25 mg tablet    umeclidinium-vilanterol (ANORO ELLIPTA) 62 5-25 MCG/INH inhaler    acetaminophen-codeine (TYLENOL #3) 300-30 mg per tablet    cephalexin (KEFLEX) 500 mg capsule     Allergies   Allergen Reactions    Plavix [Clopidogrel Bisulfate] GI Bleeding       Vitals: Blood pressure 122/60, pulse 73, temperature 97 8 °F (36 6 °C), resp  rate 18, height 5' 6" (1 676 m), weight 88 8 kg (195 lb 12 3 oz), SpO2 97 %  , 3 L nasal cannula, Body mass index is 31 6 kg/m²        Intake/Output Summary (Last 24 hours) at 07/03/18 1609  Last data filed at 07/03/18 1343   Gross per 24 hour   Intake             2299 ml   Output             1222 ml   Net             1077 ml       Physical exam:    General Appearance:    Lethargic but easily arousable to voice, no conversational dyspnea or accessory     muscle use       Head/eyes:    Normocephalic, without obvious abnormality, atraumatic,         PERRL, sclera non-injected, nonicteric    Nose:   Nares normal, mucosa normal, no drainage or sinus tenderness   Mouth:   Moist mucous membranes, no thrush, normal dentition   Neck:   Supple, trachea midline, no adenopathy; JVD not elevated   Lungs:     Decreased breath sounds in all lung fields with bibasilar crackles, no wheezes appreciated   Chest Wall:    No tenderness or deformity    Heart:    Regular rate and rhythm, S1 and S2 normal, systolic ejection murmur, rub   or gallop   Abdomen:     Soft, non-tender, bowel sounds active all four quadrants,     no masses, no organomegaly   Extremities:   Extremities normal, atraumatic, no cyanosis or edema   Skin:   Warm, dry, turgor normal, no rashes or lesions   Lymph nodes:   No Cervical or supraclavicular lymphadenopathy   Neurologic:   CNII-XII grossly intact, normal strength, non-focal         Labs:   BNP: No results found for: BNP, CBC:   Lab Results   Component Value Date    WBC 11 06 (H) 07/03/2018    HGB 8 0 (L) 07/03/2018    HCT 26 3 (L) 07/03/2018    MCV 92 07/03/2018     07/03/2018    MCH 28 1 07/03/2018    MCHC 30 4 (L) 07/03/2018    RDW 15 2 (H) 07/03/2018    MPV 9 9 07/03/2018    NRBC 0 07/03/2018   , CMP:   Lab Results   Component Value Date     07/03/2018    K 3 8 07/03/2018     07/03/2018    CO2 33 (H) 07/03/2018    ANIONGAP 5 07/03/2018    BUN 93 (H) 07/03/2018    CREATININE 2 54 (H) 07/03/2018    GLUCOSE 159 (H) 07/03/2018    CALCIUM 7 9 (L) 07/03/2018    EGFR 21 07/03/2018       Imaging and other studies: I have personally reviewed pertinent films in PACS  07/03/2018:  Chest x-ray new basilar right-sided infiltrate    CT chest on admission, bilateral small pleural effusions    Pulmonary function testing:  Not available    Claudis Cooks, DO      Portions of the record may have been created with voice recognition software  Occasional wrong word or "sound a like" substitutions may have occurred due to the inherent limitations of voice recognition software  Read the chart carefully and recognize, using context, where substitutions have occurred  Matt

## (undated) DEVICE — SINGLE-USE BIOPSY FORCEPS: Brand: RADIAL JAW 4

## (undated) DEVICE — TUBING SUCTION 5MM X 12 FT

## (undated) DEVICE — SYRINGE 50ML LL

## (undated) DEVICE — BITE BLOCK ADULT 11FR OMNI BLOC

## (undated) DEVICE — TRAVELKIT CONTAINS FIRST STEP KIT (200ML EP-4 KIT) AND SOILED SCOPE BAG - 1 KIT: Brand: TRAVELKIT CONTAINS FIRST STEP KIT AND SOILED SCOPE BAG

## (undated) DEVICE — SYRINGE 30ML LL